# Patient Record
Sex: MALE | Race: WHITE | NOT HISPANIC OR LATINO | Employment: FULL TIME | URBAN - METROPOLITAN AREA
[De-identification: names, ages, dates, MRNs, and addresses within clinical notes are randomized per-mention and may not be internally consistent; named-entity substitution may affect disease eponyms.]

---

## 2017-02-14 ENCOUNTER — ALLSCRIPTS OFFICE VISIT (OUTPATIENT)
Dept: OTHER | Facility: OTHER | Age: 52
End: 2017-02-14

## 2017-03-06 ENCOUNTER — GENERIC CONVERSION - ENCOUNTER (OUTPATIENT)
Dept: OTHER | Facility: OTHER | Age: 52
End: 2017-03-06

## 2017-04-11 ENCOUNTER — ALLSCRIPTS OFFICE VISIT (OUTPATIENT)
Dept: OTHER | Facility: OTHER | Age: 52
End: 2017-04-11

## 2017-04-11 DIAGNOSIS — F11.20 UNCOMPLICATED OPIOID DEPENDENCE (HCC): ICD-10-CM

## 2017-04-11 DIAGNOSIS — Z79.899 OTHER LONG TERM (CURRENT) DRUG THERAPY: ICD-10-CM

## 2017-04-11 DIAGNOSIS — G89.4 CHRONIC PAIN SYNDROME: ICD-10-CM

## 2017-06-06 ENCOUNTER — ALLSCRIPTS OFFICE VISIT (OUTPATIENT)
Dept: OTHER | Facility: OTHER | Age: 52
End: 2017-06-06

## 2017-08-01 ENCOUNTER — ALLSCRIPTS OFFICE VISIT (OUTPATIENT)
Dept: OTHER | Facility: OTHER | Age: 52
End: 2017-08-01

## 2017-09-28 ENCOUNTER — ALLSCRIPTS OFFICE VISIT (OUTPATIENT)
Dept: OTHER | Facility: OTHER | Age: 52
End: 2017-09-28

## 2017-10-29 NOTE — PROGRESS NOTES
Assessment    1  Lumbar radiculopathy (724 4) (M54 16)   2  Low back pain (724 2) (M54 5)   3  Myofascial pain (729 1) (M79 1)   4  Pain syndrome, chronic (338 4) (G89 4)   5  Post laminectomy syndrome (722 80) (M96 1)    Plan  Back pain, Low back pain, Lumbar radiculopathy, Neuropathy    · Nucynta  MG Oral Tablet Extended Release 12 Hour; Take 1 tablet twicedaily; Do Not Fill Before: [id number]   Rx By: Valeriy Umanzor; Dispense: 30 Days ; #:60 Tablet Extended Release 12 Hour; Refill: 0;Back pain, Low back pain, Lumbar radiculopathy, Neuropathy; KARL = N; Print Rx  Cervical radiculopathy, Degenerative disc disease, lumbar, Low back pain, Lumbago,Lumbar radiculopathy, Post laminectomy syndrome    · Hydrocodone-Acetaminophen 5-325 MG Oral Tablet; TAKE 1 TABLET Every 8hours; Do Not Fill Before: [id number]   Rx By: Valeriy Umanzor; Dispense: 30 Days ; #:90 Tablet; Refill: 0;Cervical radiculopathy, Degenerative disc disease, lumbar, Low back pain, Lumbago, Lumbar radiculopathy, Post laminectomy syndrome; KARL = N; Print Rx  Post-thoracotomy pain    · Lidocaine 5 % External Patch (Lidoderm); APPLY 1 PATCH TO THE AFFECTEDAREA AND LEAVE IN PLACE FOR 12 HOURS, THEN REMOVE AND LEAVE OFF FOR 12HOURS   Rx By: Valeriy Umanzor; Dispense: 30 Days ; #:60 Patch; Refill: 1;Post-thoracotomy pain; KARL = N; Verified Transmission to [facility][id number] Last Updated By: System, SureScrigordon; 9/28/2017 2:52:59 PM    Discussion/Summary    This is a 51-year-old male patient who presents to the office for follow-up visit today  The patient is going being treated for lumbar radiculopathy, myofascial pain, chronic pain syndrome, and post laminectomy syndrome  The patient does have a history of T8-T12 decompression and fusion in December 2014 as well as spinal cord stimulator and peripheral nerve stimulator placement   The patient reports that he is currently taking Nucynta extended release 150 mg twice a day and he is also taking hydrocodone?acetaminophen 5/325 3 times a day for his chronic pain symptoms  The patient denies any adverse side effects from these medications at this time  He reports that these medications help him perform his activities of daily living and to continue working  The patient reports that he does take baclofen 10 mg 3 times a day for his myofascial pain and muscle spasm  The patient reports that he is taking Vimovo 500?20 twice a day for his pain symptoms as well  The patient reports that he does take gabapentin as prescribed by his primary care physician and denies any adverse side effects from this medication at this time  plan for this patient is as follows:I will renew this patient's Nucynta extended release 150 mg twice a day for his pain complaints  did provide this patient with 2 prescriptions while he was in the office today for his Nucynta extended release 150 mg with do not fill date of 10/27/17   will renew this patient's hydrocodone?acetaminophen 5/325 3 times a day when necessary for his pain complaints  I encouraged this patient to take this medication as sparingly as possible  did provide this patient with 2 prescriptions for his hydrocodone?acetaminophen while he was in the office today with do not fill date of 10/27/17  risks of opioid medications, including dependence, addiction and tolerance are explained to the patient  The patient understands and agrees to use these medications only as prescribed  I fully discussed the potential side effects of the medication with the patient, which include, but are not limited to, constipation, drowsiness, addiction, impaired judgment and risk of fetal overdose if not taken as prescribed  I have warned the patient ensuring medications as a felony  I warned against driving while taking sedating medications  At this point in time the patient is showing no signs of addiction, abuse, diversion or suicidal ideation    [address] prescription drug monitoring program and found it to be appropriate for what is being prescribed I reviewed for any inconsistencies or evidence of multiple prescribers or drug diversion  A copy of the patient's report can be found in the patients' chart  The patient reports that the Nucynta 150 mg extended release and the hydrocodone?acetaminophen 5/325 is effective at decreasing his pain symptoms  effects: The patient denies adverse effects from the opioid medications at this time  The patient reports that he will occasionally become constipated as a result of his opiate medications, however, he just takes stool softeners and MiraLAX and the problem is resolved  I did offer this patient Movantik, however, the patient reports that he does not need this medication  of daily living: The patient reports that this medication is helpful in allowing them to engage in activities of daily living and increase level of functioning  behavior: The patient displays no indication of aberrant drug behavior at this time  urine drug screen was collected at today's office visit  The urine drug screen will be sent for confirmatory testing  The drug test is medically necessary because the patient is either dependent on opioid medication or being considered for opioid medication therapy in the results could impact ongoing her future treatment  The drug test is to evaluate for the presence or absence of prescribed, nonprescribed, and or illicit drugs/substances  I will renew this patient's baclofen 10 mg 3 times a day when necessary for his myofascial pain and muscle spasm  I will renew this patient's Vimovo 500?20 twice a day when necessary for his pain complaints  I encouraged this patient is take this medication with food and to avoid all other NSAIDs on this medication  The patient should continue with his gabapentin for his neuropathic and radicular pain complaints as prescribed by his primary care physician    The patient should continue with his home exercise program I will follow-up with this patient in 2 months  The patient has the current Goals: To provide this patient with adequate pain relief to improve quality of life and level of functioning  The patent has the current Barriers: Chronic pain syndrome  Chronic opioid use  Opioid tolerance and dependence  Patient is able to Self-Care  The treatment plan was reviewed with the patient/guardian  The patient/guardian understands and agrees with the treatment plan   The patient was counseled regarding instructions for management,-- risk factor reductions,-- prognosis,-- patient and family education,-- impressions,-- risks and benefits of treatment options-- and-- importance of compliance with treatment  total time of encounter was 25 minutes  Chief Complaint    1  Back Pain  Back and leg pain  History of Present Illness  This is a 42-year-old male patient who presents to the office for follow-up visit today  The patient is going being treated for lumbar radiculopathy, myofascial pain, chronic pain syndrome, and post laminectomy syndrome  The patient does have a history of T8-T12 decompression and fusion in December 2014 as well as spinal cord stimulator and peripheral nerve stimulator placement  The patient reports that he is currently taking Nucynta extended release 150 mg twice a day and he is also taking hydrocodone?acetaminophen 5/325 3 times a day for his chronic pain symptoms  The patient denies any adverse side effects from these medications at this time  He reports that these medications help him perform his activities of daily living and to continue working  The patient reports that he does take baclofen 10 mg 3 times a day for his myofascial pain and muscle spasm  The patient reports that he is taking Vimovo 500?20 twice a day for his pain symptoms as well   The patient reports that he does take gabapentin as prescribed by his primary care physician and denies any adverse side effects from this medication at this time  patient reports that his pain is the same as his previous visit rates his pain as 6/10 on the numerical pain rating scale  The patient reports that his pain is worse in the morning and night and that his pain is constant and describes the pain as a burning, dull aching, sharp, throbbing, pressure like, shooting, numbness, and pins and needles-like in nature  The patient reports that the amount of pain relief that he is obtaining now with his current medication regimen and treatment plan is enough to make a real difference in his life  The patient reports that 50% of his pain is being alleviated with his current medication regimen treatment plan   have personally reviewed and/or updated the patient's past medical history, past surgical history, family history, social history, allergies, and vital signs today  Other than as stated above, the patient denies any interval changes in medications, medical condition, mental condition, symptoms, or allergies since last office visit  Juan Rodríguez presents with complaints of constant episodes of bilateral lower back pain, described as sharp, dull, aching, burning and throbbing, radiating to the bilateral lower leg  On a scale of 1 to 10, the patient rates the pain as 6  Symptoms are unchanged  Review of Systems   Constitutional: no fever,-- no recent weight gain-- and-- no recent weight loss  Eyes: no double vision-- and-- no blurry vision  Cardiovascular: no chest pain,-- no palpitations-- and-- no lower extremity edema  Respiratory: no complaints of shortness of breath-- and-- no wheezing  Musculoskeletal: difficulty walking,-- muscle weakness,-- joint stiffness,-- pain in extremity legs-- and-- decreased range of motion, but-- no joint swelling-- and-- no limb swelling  Neurological: no dizziness,-- no difficulty swallowing,-- no memory loss,-- no loss of consciousness-- and-- no seizures    Gastrointestinal: constipation, but-- no nausea,-- no vomiting-- and-- no diarrhea  Genitourinary: no difficulty initiating urine stream,-- no genital pain-- and-- no frequent urination  Integumentary: no complaints of skin rash  Psychiatric: no depression  Endocrine: no excessive thirst,-- no adrenal disease,-- no hypothyroidism-- and-- no hyperthyroidism  Hematologic/Lymphatic: no tendency for easy bruising-- and-- no tendency for easy bleeding  ROS reviewed  Active Problems    1  Analgesic use (V58 69) (Z79 899)   2  Arthritis (716 90) (M19 90)   3  Back pain (724 5) (M54 9)   4  Cervical radiculopathy (723 4) (M54 12)   5  Constipation (564 00) (K59 00)   6  Degenerative disc disease, lumbar (722 52) (M51 36)   7  Diabetes Mellitus (250 00)   8  Dysphagia (787 20) (R13 10)   9  Encounter for removal of staples (V58 32) (Z48 02)   10  Eye problems (V41 1) (H57 9)   11  Facial pain (784 0) (R51)   12  Fatigue (780 79) (R53 83)   13  Feeling weak (780 79) (R53 1)   14  Gait disturbance (781 2) (R26 9)   15  Heart burn (787 1) (R12)   16  History of allergy (V15 09) (Z88 9)   17  Hypercholesterolemia (272 0) (E78 00)   18  Hyperlipidemia (272 4) (E78 5)   19  Kyphoscoliosis (737 30) (M41 9)   20  Limb pain (729 5) (M79 609)   21  Low back pain (724 2) (M54 5)   22  Lumbago (724 2) (M54 5)   23  Lumbar radiculopathy (724 4) (M54 16)   24  Muscle weakness (728 87) (M62 81)   25  Myofascial pain (729 1) (M79 1)   26  Neuropathy (355 9) (G62 9)   27  Numbness (782 0) (R20 0)   28  Opioid dependence (304 00) (F11 20)   29  Other congenital malformations of spine, 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)   30  Other spondylosis with myelopathy, thoracic region (721 41) (M47 14)   31  Pain syndrome, chronic (338 4) (G89 4)   32  Paralysis (344 9) (G83 9)   33  Post laminectomy syndrome (722 80) (M96 1)   34  Postlaminectomy Kyphosis (737 12)   35  Postoperative visit (C95 49) (Z97 89)   36   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)   37  Rib pain on right side (786 50) (R07 81)   38  Ringing In The Ears (Tinnitus) (388 30)   39  Surgery, elective (V50 9) (Z41 9)   40  Thoracic disc herniation (722 11) (M51 24)   41  Thoracic spinal stenosis (724 01) (M48 04)   42  TMJ arthralgia (524 62) (M26 629)   43  Tobacco use (305 1) (Z72 0)   44  Trigeminal neuralgia (350 1) (G50 0)   45  Trigger finger of both hands (727 03) (M65 30)    Past Medical History  1  History of Complaint Of Allergic Reaction Seasonal   2  Diabetes Mellitus (250 00)   3  History of GERD (gastroesophageal reflux disease) (530 81) (K21 9)   4  History of heartburn (V12 79) (Z87 898)   5  Hypercholesterolemia (272 0) (E78 00)   6  History of Hypertension (401 9) (I10)   7  History of Paraplegia   8  History of Thoracic back pain (724 1) (M54 6)   9  History of Urinary Tract Infection (V13 02)    The active problems and past medical history were reviewed and updated today  Surgical History  1  History of Back Surgery   2  History of Intracranial Stereotactic Gamma Radiosurgery   3  History of Intracranial Stereotactic Gamma Radiosurgery   4  History of Laminectomy Lumbar   5  History of Rhizotomy   6  History of Rhizotomy   7  History of Spinal Surgery Neurostimulator Implants    The surgical history was reviewed and updated today  Family History  Mother    1  No pertinent family history  Maternal Grandmother    2  Family history of Acute Myocardial Infarction (V17 3)  Paternal Grandmother    3  Family history of Acute Myocardial Infarction (V17 3)  Maternal Grandfather    4  Family history of Suicide Completion  Paternal Grandfather    5  Family history of Acute Myocardial Infarction (V17 3)  Unknown    6  Family history of Leukemia  Family History    7  Family history of Acute Myocardial Infarction (V17 3)   8  Family history of Maternal Grandfather Is    5  Family history of Maternal Grandmother Is    8   Family history of Paternal Grandfather Is    6  Family history of Paternal Grandmother Is    15  Family history of Suicide Completion    The family history was reviewed and updated today  Social History     · Being A Social Drinker   · Current Every Day Smoker   · Denied: History of Drug Use   · Marital History - Single   · Occupation:   · Tobacco use (305 1) (Z72 0)  The social history was reviewed and updated today  The social history was reviewed and is unchanged  Current Meds   1  Atorvastatin Calcium 80 MG Oral Tablet; Therapy: (Recorded:2014) to Recorded   2  Baclofen 10 MG Oral Tablet; TAKE 1 TABLET 3 TIMES DAILY AS NEEDED FOR MUSCLE SPASM  Requested for: 2017; Last Rx:2017 Ordered   3  Gabapentin 400 MG Oral Capsule; Take 1 capsule twice daily; Therapy: (Recorded:10Zkq2526) to Recorded   4  Gabapentin 600 MG Oral Tablet; Take 1 tablet twice daily; Therapy: (Recorded:2015) to Recorded   5  Gabapentin 800 MG Oral Tablet; Take 1 tablet twice daily; Therapy: (Recorded:2015) to Recorded   6  Hydrocodone-Acetaminophen 5-325 MG Oral Tablet; TAKE 1 TABLET Every 8 hours; Therapy: 45NMJ6810 to (Evaluate:85Dvv6052); Last Rx:2017 Ordered   7  Lidocaine 5 % External Patch; APPLY 1 PATCH TO THE AFFECTED AREA AND LEAVE IN PLACE FOR 12 HOURS, THEN REMOVE AND LEAVE OFF FOR 12 HOURS  Requested for: ; Last Rx:2017 Ordered   8  Lisinopril 2 5 MG Oral Tablet; TAKE 1 TABLET DAILY; Therapy: (Recorded:72Sdm2730) to Recorded   9  MetFORMIN HCl - 1000 MG Oral Tablet; TAKE 1 TABLET TWICE DAILY; Therapy: (Recorded:81Lpq7887) to Recorded   10  Multi-Vitamin Gummies CHEW;  Therapy: (Recorded:56Sdt3378) to Recorded   11  Nucynta  MG Oral Tablet Extended Release 12 Hour; Take 1 tablet twice daily; Therapy: 80DFJ8628 to (Evaluate:21Wez1296); Last Rx:2017 Ordered   12  Omeprazole 40 MG Oral Capsule Delayed Release; Therapy: 76ZVE5811 to Recorded   13   Polyethylene Glycol 3350 Oral Packet; MIX 1 PACKET IN 8 OUNCES OF LIQUID AND  DRINK ONCE DAILY; Therapy: 89EHK3592 to (Evaluate:82Mza8768)  Requested for: 00FIX5637; Last  Rx:05Byo6295 Ordered   14  Trileptal 600 MG Oral Tablet; TAKE 2 TABLETS TWICE DAILY; Therapy: (Recorded:23Guj1811) to Recorded   15  Vimovo 500-20 MG Oral Tablet Delayed Release; TAKE 1 TABLET Twice daily PRN; Therapy: 99VTJ0849 to (Evaluate:21Pcm6734)  Requested for: 30UFK0246; Last  Rx:00Hwl1373 Ordered    The medication list was reviewed and updated today  Allergies  1  Sulfa Drugs    Vitals  Vital Signs    Recorded: 08NMM2215 02:29PM   Temperature 98 8 F   Heart Rate 82   Respiration 18   Systolic 987   Diastolic 78   Height 5 ft 9 in   Weight 198 lb    BMI Calculated 29 24   BSA Calculated 2 06   Pain Scale 6       Physical Exam   Constitutional  General appearance: Abnormal  -- Endomorphic body habitus  Eyes  Sclera: anicteric  HEENT  Hearing grossly intact  Neck  Neck: Supple, symmetric, trachea midline, no masses  Pulmonary  Respiratory effort: Even and unlabored  Cardiovascular  Examination of extremities: No edema or pitting edema present  Abdomen  Abdomen: Abnormal  -- Protuberant  Skin  Skin and subcutaneous tissue: Normal without rashes or lesions, well hydrated  Psychiatric  Mood and affect: Mood and affect appropriate  Neurologic  Cranial nerves: Cranial nerves II-XII grossly intact  the muscle tone was normal  Musculoskeletal  Gait and station: Abnormal  -- Antalgic  Lumbar/Sacral Spine examination demonstrates  5/5 lower extremity strength in all muscle groups bilaterally  Negative seated straight leg raise bilaterally  Lumbar paraspinals tender to palpation with muscle spasms noted        Future Appointments    Date/Time Provider Specialty Site   11/22/2017 03:00 PM [facility]       Signatures   Electronically signed by : Erin Eastman; Sep 28 2017  3:49PM EST (Author)    Electronically signed by : Josie Muñiz DO; Oct 13 2017 11:59AM EST

## 2017-12-06 ENCOUNTER — ALLSCRIPTS OFFICE VISIT (OUTPATIENT)
Dept: OTHER | Facility: OTHER | Age: 52
End: 2017-12-06

## 2017-12-11 NOTE — PROGRESS NOTES
Assessment    1  Chronic bilateral low back pain (724 2,338 29) (M54 5,G89 29)   2  Myofascial pain (729 1) (M79 1)   3  Chronic lumbar radiculopathy (724 4) (M54 16)   4  Pain syndrome, chronic (338 4) (G89 4)   5  Post laminectomy syndrome (722 80) (M96 1)   6  Degenerative disc disease, lumbar (722 52) (M51 36)    Plan   Back pain, Chronic bilateral low back pain, Chronic lumbar radiculopathy, Neuropathy    · Nucynta  MG Oral Tablet Extended Release 12 Hour; Take 1 tablet twicedaily   Rx By: Claire Gutierrez; Dispense: 30 Days ; #:60 Tablet Extended Release 12 Hour; Refill: 0;Back pain, Chronic bilateral low back pain, Chronic lumbar radiculopathy, Neuropathy; KARL = N; Print Rx  Cervical radiculopathy, Chronic bilateral low back pain, Chronic lumbar radiculopathy,Degenerative disc disease, lumbar, PMH: History of low back pain, Postlaminectomy syndrome    · From  Hydrocodone-Acetaminophen 5-325 MG Oral Tablet TAKE 1 TABLETEvery 8 hours To Hydrocodone-Acetaminophen 5-325 MG Oral Tablet TAKE 1 TABLETEVERY 12 HOURS PRN FOR PAIN   Rx By: Claire Gutierrez; Dispense: 30 Days ; #:60 Tablet; Refill: 0;Cervical radiculopathy, Chronic bilateral low back pain, Chronic lumbar radiculopathy, Degenerative disc disease, lumbar, PMH: History of low back pain, Post laminectomy syndrome; KARL = N; Print Rx  Myofascial pain    · Baclofen 10 MG Oral Tablet; TAKE 1 TABLET 2 TIMES DAILY AS NEEDED FORMUSCLE SPASM   Rx By: Claire Gutierrez; Dispense: 90 Days ; #:180 Tablet; Refill: 0;Myofascial pain; KARL = N; Rx auto-faxed to [facility]); Last Updated By: System, [employer]; 12/6/2017 2:02:36 PM  Post-thoracotomy pain    · Lidocaine 5 % External Patch (Lidoderm); APPLY 1 PATCH TO THE AFFECTEDAREA AND LEAVE IN PLACE FOR 12 HOURS, THEN REMOVE AND LEAVE OFF FOR 12HOURS   Rx By: Lurdes Vigil; Dispense: 30 Days ; #:60 Patch;  Refill: 1;Post-thoracotomy pain; KARL = N; Verified Transmission to [facility]Y #[id number] Last Updated Mani Leyva; 12/6/2017 2:01:44 PM  Unlinked    · Gabapentin 400 MG Oral Capsule   Dispense: 0 Days ; #: Sufficient Capsule; Refill: 0; KARL = N; Record; Last Updated By: Ledy Ferro; 12/6/2017 1:57:58 PM  Follow-up visit in 10 weeks Evaluation and Treatment  Follow-up  Status: Hold For - Scheduling  Requested for: [id number] Ordered; For: Pain syndrome, chronic;  Ordered By: Ledy Ferro  Performed:   Due: 50YQL4062   Discussion/Summary    While the patient was in the office today, I did have a thorough conversation with the patient regarding his medication regimen and treatment plan  I explained to the patient that at this point in time since he is noting moderate stable relief, and typically only uses the p r n  Norco twice a day, both myself and the patient feel would be in his best interest to decrease the p r n  Norco down to just twice a day and continue the rest of his medication regimen as prescribed  However, because the patient reports that he has a full prescription for both the p r n  [facility] to  at his pharmacy and 1 on filled prescription of each at home, he will only need 1 prescription for his pain medications which they gave him and put a do not fill date of January 31, 2018  I advised the patient that if they experience any side effects or issues with the changes in their medication regiment, they should give our office a call to discuss  I also advised the patient not to drive or operate machinery until they see how the changes in the medication regimen affects them  The patient was agreeable and verbalized an understanding  encouraged the patient continue to use his spinal cord stimulators and to follow up with the stimulator teens as needed for any reprogramming  The patient was agreeable and verbalized an understanding    the patient was in the office today, I did review the patient's report on the [facility] web site and found it to be appropriate for what is being prescribed and I reviewed it for any inconsistencies or evidence of multiple prescribers/drug diversion  A copy of the patient's report can be found in their chart  the patient was in the office today, an annual review of the opioid contract/agreement was conducted, the patient was agreeable to continuing the contract as previously agreed upon and signed for this calendar year  risk of opioid medications, including dependence, addiction and tolerance were explained to the patient  The patient understands and agrees to use these medications only as prescribed  I have fully discussed the potential side effects of the medication with the patient, which include, but are not limited to, constipation, drowsiness, addiction, impaired judgment and risk of fatal overdose as not taken as prescribed  I have warned the patient that sharing medications is a felony  I warned against driving while taking sedating medications  At this point in time, the patient is showing no signs of addiction, abuse, diversion or suicidal ideation  The patient has the current Goals: To continue with at least a current level of pain relief and medication regimen as prescribed, utilizing the least amount of opioids possible  The patent has the current Barriers: Chronic pain syndrome and opioid dependence  Patient is able to Self-Care  The treatment plan was reviewed with the patient/guardian  The patient/guardian understands and agrees with the treatment plan   The patient was counseled regarding instructions for management,-- prognosis,-- patient and family education,-- risks and benefits of treatment options-- and-- importance of compliance with treatment  total time of encounter was 25 minutes  Chief Complaint    1  Back Pain  Chronic low back and leg pain, stable  History of Present Illness  The patient presents today for a follow-up office visit   He is currently being treated for his chronic low back and lower extremity radicular symptoms as he does feel that the spinal cord stimulator is helpful for the leg pain, but that the peripheral nerve stimulator is not helpful for the back pain as much as he would like  The patient's spinal cord and peripheral field nerve stimulator is a [employer] Device, but he also has a deep brain stimulator with [employer] Avanza  The patient reports that overall his current medication regimen does provide at least 50% relief of his pain symptoms, without any significant side effects or issues  He also reports that he tries to use the p r n  Norco on a sparing as-needed basis and typically at most uses 2-3 pills a day, but generally only uses 2 pills a day as needed  He continues with the Nucynta ER and Vimovo as prescribed  The patient presents today to discuss his medication regimen treatment plan  Cynthia Rivera presents with complaints of constant episodes of bilateral lower back pain, described as sharp, dull, aching and burning, radiating to the bilateral buttock, bilateral thigh, bilateral lower leg and bilateral foot  On a scale of 1 to 10, the patient rates the pain as 6  Symptoms are unchanged  Review of Systems   Constitutional: no fever,-- no recent weight gain-- and-- no recent weight loss  Eyes: no double vision-- and-- no blurry vision  Cardiovascular: no chest pain,-- no palpitations-- and-- no lower extremity edema  Respiratory: no complaints of shortness of breath-- and-- no wheezing  Musculoskeletal: difficulty walking,-- muscle weakness,-- joint stiffness-- and-- decreased range of motion, but-- no joint swelling,-- no limb swelling-- and-- no pain in extremity  Neurological: no dizziness,-- no difficulty swallowing,-- no memory loss,-- no loss of consciousness-- and-- no seizures  Gastrointestinal: constipation, but-- no nausea,-- no vomiting-- and-- no diarrhea    Genitourinary: no difficulty initiating urine stream,-- no genital pain-- and-- no frequent urination  Integumentary: no complaints of skin rash  Psychiatric: no depression  Endocrine: no excessive thirst,-- no adrenal disease,-- no hypothyroidism-- and-- no hyperthyroidism  Hematologic/Lymphatic: no tendency for easy bruising-- and-- no tendency for easy bleeding  Active Problems    1  Analgesic use (V58 69) (Z79 899)   2  Arthritis (716 90) (M19 90)   3  Back pain (724 5) (M54 9)   4  Cervical radiculopathy (723 4) (M54 12)   5  Chronic bilateral low back pain (724 2,338 29) (M54 5,G89 29)   6  Chronic lumbar radiculopathy (724 4) (M54 16)   7  Constipation (564 00) (K59 00)   8  Degenerative disc disease, lumbar (722 52) (M51 36)   9  Diabetes Mellitus (250 00)   10  Dysphagia (787 20) (R13 10)   11  Encounter for removal of staples (V58 32) (Z48 02)   12  Eye problems (V41 1) (H57 9)   13  Facial pain (784 0) (R51)   14  Fatigue (780 79) (R53 83)   15  Feeling weak (780 79) (R53 1)   16  Gait disturbance (781 2) (R26 9)   17  Heart burn (787 1) (R12)   18  History of allergy (V15 09) (Z88 9)   19  Hypercholesterolemia (272 0) (E78 00)   20  Hyperlipidemia (272 4) (E78 5)   21  Kyphoscoliosis (737 30) (M41 9)   22  Limb pain (729 5) (M79 609)   23  Muscle weakness (728 87) (M62 81)   24  Myofascial pain (729 1) (M79 1)   25  Neuropathy (355 9) (G62 9)   26  Numbness (782 0) (R20 0)   27  Opioid dependence (304 00) (F11 20)   28  Other congenital malformations of spine, 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)   29  Other spondylosis with myelopathy, thoracic region (721 41) (M47 14)   30  Pain syndrome, chronic (338 4) (G89 4)   31  Paralysis (344 9) (G83 9)   32  Post laminectomy syndrome (722 80) (M96 1)   33  Postlaminectomy Kyphosis (737 12)   34  Postoperative visit (V58 49) (Z48 89)   35  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)   36  Rib pain on right side (786 50) (R07 81)   37  Ringing In The Ears (Tinnitus) (388 30)   38  Surgery, elective (V50 9) (Z41 9)   39  Thoracic disc herniation (722 11) (M51 24)   40  Thoracic spinal stenosis (724 01) (M48 04)   41  TMJ arthralgia (524 62) (M26 629)   42  Tobacco use (305 1) (Z72 0)   43  Trigeminal neuralgia (350 1) (G50 0)   44  Trigger finger of both hands (727 03) (M65 30)    Past Medical History  1  History of Complaint Of Allergic Reaction Seasonal   2  Diabetes Mellitus (250 00)   3  History of GERD (gastroesophageal reflux disease) (530 81) (K21 9)   4  History of heartburn (V12 79) (Z87 898)   5  History of low back pain (V13 59) (Z87 39)   6  Hypercholesterolemia (272 0) (E78 00)   7  History of Hypertension (401 9) (I10)   8  History of Paraplegia   9  History of Thoracic back pain (724 1) (M54 6)   10  History of Urinary Tract Infection (V13 02)    The active problems and past medical history were reviewed and updated today  Surgical History  1  History of Back Surgery   2  History of Intracranial Stereotactic Gamma Radiosurgery   3  History of Intracranial Stereotactic Gamma Radiosurgery   4  History of Laminectomy Lumbar   5  History of Rhizotomy   6  History of Rhizotomy   7  History of Spinal Surgery Neurostimulator Implants    The surgical history was reviewed and updated today  Family History  Mother    1  No pertinent family history  Maternal Grandmother    2  Family history of Acute Myocardial Infarction (V17 3)  Paternal Grandmother    3  Family history of Acute Myocardial Infarction (V17 3)  Maternal Grandfather    4  Family history of Suicide Completion  Paternal Grandfather    5  Family history of Acute Myocardial Infarction (V17 3)  Unknown    6  Family history of Leukemia  Family History    7  Family history of Acute Myocardial Infarction (V17 3)   8  Family history of Maternal Grandfather Is    5  Family history of Maternal Grandmother Is    8  Family history of Paternal Grandfather Is    6  Family history of Paternal Grandmother Is    15   Family history of Suicide Completion    The family history was reviewed and updated today  Social History     · Being A Social Drinker   · Current Every Day Smoker   · Denied: History of Drug Use   · Marital History - Single   · Occupation:   · Tobacco use (305 1) (Z72 0)  The social history was reviewed and updated today  The social history was reviewed and is unchanged  Current Meds   1  Atorvastatin Calcium 80 MG Oral Tablet; Therapy: (Recorded:21Mar2014) to Recorded   2  Baclofen 10 MG Oral Tablet; TAKE 1 TABLET 3 TIMES DAILY AS NEEDED FOR MUSCLE SPASM  Requested for: 07PND7883; Last Rx:53Nfh6110 Ordered   3  Elavil 25 MG Oral Tablet; Therapy: 07KUI0113 to Recorded   4  Gabapentin 400 MG Oral Capsule; Take 1 capsule twice daily; Therapy: (Recorded:19Zah3664) to Recorded   5  Gabapentin 600 MG Oral Tablet; Take 1 tablet twice daily; Therapy: (Recorded:93Xjl6538) to Recorded   6  Gabapentin 800 MG Oral Tablet; Take 1 tablet twice daily; Therapy: (Recorded:17Bty3527) to Recorded   7  Hydrocodone-Acetaminophen 5-325 MG Oral Tablet; TAKE 1 TABLET Every 8 hours; Therapy: 51UNB2172 to (Evaluate:26Nov2017); Last Rx:28Sep2017 Ordered   8  Lidocaine 5 % External Patch; APPLY 1 PATCH TO THE AFFECTED AREA AND LEAVE IN PLACE FOR 12 HOURS, THEN REMOVE AND LEAVE OFF FOR 12 HOURS  Requested for: 37JBF4202; Last Rx:28Sep2017 Ordered   9  Lisinopril 2 5 MG Oral Tablet; TAKE 1 TABLET DAILY; Therapy: (Recorded:31Zwe8251) to Recorded   10  MetFORMIN HCl - 1000 MG Oral Tablet; TAKE 1 TABLET TWICE DAILY; Therapy: (Recorded:91Kcu8378) to Recorded   11  Multi-Vitamin Gummies CHEW;  Therapy: (Recorded:76Mhs0552) to Recorded   12  Nucynta  MG Oral Tablet Extended Release 12 Hour; Take 1 tablet twice daily; Therapy: 54QLT5380 to (Evaluate:26Nov2017); Last Rx:28Sep2017 Ordered   13  Omeprazole 40 MG Oral Capsule Delayed Release; Therapy: 14KDH4253 to Recorded   14   Polyethylene Glycol 3350 Oral Packet; MIX 1 PACKET IN 8 OUNCES OF LIQUID AND  DRINK ONCE DAILY; Therapy: 37JOJ6103 to (Evaluate:71Dhf3900)  Requested for: 34DOJ0515; Last  Rx:32Bpo0951 Ordered   15  Trileptal 600 MG Oral Tablet; TAKE 2 TABLETS TWICE DAILY; Therapy: (Recorded:29Ofw6531) to Recorded   16  Vimovo 500-20 MG Oral Tablet Delayed Release; TAKE 1 TABLET Twice daily PRN; Therapy: 99UMV2517 to (Evaluate:83Itw5472)  Requested for: 20AOK8302; Last  Rx:60Lvw8743 Ordered    The medication list was reviewed and updated today  Allergies  1  Sulfa Drugs    Vitals  Vital Signs    Recorded: 32SOO9390 01:43PM   Temperature 99 F   Heart Rate 94   Systolic 413   Diastolic 90   Height 5 ft 9 in   Weight 197 lb    BMI Calculated 29 09   BSA Calculated 2 05   Pain Scale 6       Physical Exam   Constitutional  General appearance: Well developed, well nourished, alert, in no distress, non-toxic and no overt pain behavior  Eyes  Sclera: anicteric  HEENT  Hearing grossly intact  Pulmonary  Respiratory effort: Even and unlabored  Cardiovascular  Examination of extremities: No edema or pitting edema present  Abdomen  Abdomen: Soft, non-tender, non-distended  Skin  Skin and subcutaneous tissue: Normal without rashes or lesions, well hydrated  Psychiatric  Mood and affect: Mood and affect appropriate  Neurologic Motor Tone:   Cranial nerves: Cranial nerves II-XII grossly intact  -- Slightly antalgic, but steady gait without the use of any assistive devices  Musculoskeletal       Results/Data  Procedure Flowsheet [id number] 02:05PM      Test Name Result Flag Reference   Opioid Contract Renewed [id number]         Future Appointments    Date/Time Provider Specialty Site   02/14/2018 02:15 PM TONY F[facility] 15       Signatures   Electronically signed by : TONY Wild;  Dec  8 2017  7:21AM EST                       (Author)    Electronically signed by : Tiara Whitehead DO; Dec 10 2017  7:52PM EST

## 2018-01-13 VITALS
TEMPERATURE: 98.7 F | DIASTOLIC BLOOD PRESSURE: 90 MMHG | HEART RATE: 98 BPM | HEIGHT: 69 IN | SYSTOLIC BLOOD PRESSURE: 138 MMHG | BODY MASS INDEX: 28.58 KG/M2 | WEIGHT: 193 LBS

## 2018-01-13 VITALS
DIASTOLIC BLOOD PRESSURE: 78 MMHG | HEIGHT: 69 IN | WEIGHT: 198 LBS | SYSTOLIC BLOOD PRESSURE: 122 MMHG | BODY MASS INDEX: 29.33 KG/M2 | RESPIRATION RATE: 18 BRPM | HEART RATE: 82 BPM | TEMPERATURE: 98.8 F

## 2018-01-13 VITALS
SYSTOLIC BLOOD PRESSURE: 128 MMHG | BODY MASS INDEX: 28.44 KG/M2 | TEMPERATURE: 98.9 F | WEIGHT: 192 LBS | HEIGHT: 69 IN | DIASTOLIC BLOOD PRESSURE: 88 MMHG | HEART RATE: 99 BPM

## 2018-01-13 NOTE — RESULT NOTES
Message   Recorded as Task   Date: 06/07/2016 10:39 AM, Created By: Case Monge   Task Name: Follow Up   Assigned To: [facility],Team   Regarding Patient: Severo Schiller, Status: In Progress   Comment:    Cuca Quevedo - 07 Jun 2016 10:39 AM     TASK CREATED  Caller: Self; Other; [phone] (Home); [phone] x,,,,, (Work)  **See previous task**    Pt left VM today at 1025 returning our call  Requested to be cb at work #[phone]    CB to pt,VM left to cb  Cuca Quevedo - 07 Jun 2016 10:40 AM     TASK IN PROGRESS   Aarti Hobbs - 08 Jun 2016 3:30 PM     TASK EDITED  Attempted to call cell and work and lmom to [facility]  Aarti Hobbs - 10 Binh 2016 9:44 AM     TASK EDITED  Pt  has an appt  on 7/1 c/ you  Do you want us to send him a can't reach you letter? Or F/u at povs? HD to advise  Thanks   Balaji Mchugh - 10 Binh 2016 10:08 AM     TASK REPLIED TO: Previously Assigned To Balaji Mchugh  I can f/u during ov  Or he can call us if needed          Signatures   Electronically signed by : La Nena Brooks, ; Binh 10 2016 10:53AM EST                       (Author)

## 2018-01-14 VITALS
HEART RATE: 83 BPM | TEMPERATURE: 97.9 F | HEIGHT: 69 IN | BODY MASS INDEX: 29.47 KG/M2 | WEIGHT: 199 LBS | SYSTOLIC BLOOD PRESSURE: 149 MMHG | DIASTOLIC BLOOD PRESSURE: 89 MMHG

## 2018-01-14 VITALS
HEIGHT: 69 IN | WEIGHT: 193 LBS | BODY MASS INDEX: 28.58 KG/M2 | SYSTOLIC BLOOD PRESSURE: 138 MMHG | TEMPERATURE: 98.5 F | HEART RATE: 101 BPM | DIASTOLIC BLOOD PRESSURE: 98 MMHG

## 2018-01-14 NOTE — MISCELLANEOUS
Message   Recorded as Task   Date: 03/02/2017 01:25 PM, Created By: Avery Hart   Task Name: Follow Up   Assigned To: [facility],Team   Regarding Patient: Violeta Francisco, Status: In Progress   Comment:    Aarti Hobbs - 02 Mar 2017 1:25 PM     TASK CREATED  Caller: Self; Results Inquiry; [phone] (Home); [phone] x,,,,, (Work)  S/w the pt  after receiving a vmlom from 1232 from the pt  stating he needs the FMLA form filled out on question 3 is located under part C- amount and type of leave needed  It has to state 5x a week, 1 to 24 hrs  JW to advise  Thanks   Ninfa Berg - 02 Mar 2017 5:26 PM     TASK REPLIED TO: Previously Assigned To Frank Green                      Correction made and will be faxed off to [facility] office tomorrow   Aarti Hobbs - 03 Mar 2017 7:56 AM     TASK EDITED   Aarti Hobbs - 03 Mar 2017 10:17 AM     TASK EDITED  Attempted to call pt and unable to leave message d/t pt  not being able to accept calls right now  Will attempt again later  Aarti Hobbs - 29 Mar 2017 10:17 AM     TASK IN PROGRESS   Alfredo Garcia - 03 Mar 2017 1:43 PM     TASK EDITED  attempted to call pt again and unable to leave message  Ramonita Koo - 06 Mar 2017 10:12 AM     TASK EDITED  2nd attempt to call pt again and unable to leave a message  Yany Tahkkar - 06 Mar 2017 10:26 AM     TASK EDITED  Pt's phone is shut off and not accepting incoming calls  Paperwork was refaxed on 3/3/17  Active Problems    1  Analgesic use (V58 69) (Z79 899)   2  Arthritis (716 90) (M19 90)   3  Back pain (724 5) (M54 9)   4  Cervical radiculopathy (723 4) (M54 12)   5  Constipation (564 00) (K59 00)   6  Degenerative disc disease, lumbar (722 52) (M51 36)   7  Diabetes Mellitus (250 00)   8  Dysphagia (787 20) (R13 10)   9  Encounter for removal of staples (V58 32) (Z48 02)   10  Eye problems (V41 1) (H57 9)   11  Facial pain (784 0) (R51)   12  Fatigue (780 79) (R53 83)   13   Feeling weak (780 79) (R53 1)   14  Gait disturbance (781 2) (R26 9)   15  Heart burn (787 1) (R12)   16  History of allergy (V15 09) (Z88 9)   17  Hypercholesterolemia (272 0) (E78 00)   18  Hyperlipidemia (272 4) (E78 5)   19  Kyphoscoliosis (737 30) (M41 9)   20  Limb pain (729 5) (M79 609)   21  Low back pain (724 2) (M54 5)   22  Lumbago (724 2) (M54 5)   23  Lumbar radiculopathy (724 4) (M54 16)   24  Muscle weakness (728 87) (M62 81)   25  Myofascial pain (729 1) (M79 1)   26  Neuropathy (355 9) (G62 9)   27  Numbness (782 0) (R20 0)   28  Opioid dependence (304 00) (F11 20)   29  Other congenital malformations of spine, 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)   30  Other spondylosis with myelopathy, thoracic region (721 41) (M47 14)   31  Pain syndrome, chronic (338 4) (G89 4)   32  Paralysis (344 9) (G83 9)   33  Post laminectomy syndrome (722 80) (M96 1)   34  Postlaminectomy Kyphosis (737 12)   35  Postoperative visit (V58 49) (Z48 89)   36  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)   37  Rib pain on right side (786 50) (R07 81)   38  Ringing In The Ears (Tinnitus) (388 30)   39  Surgery, elective (V50 9) (Z41 9)   40  Thoracic disc herniation (722 11) (M51 24)   41  Thoracic spinal stenosis (724 01) (M48 04)   42  TMJ arthralgia (524 62) (M26 629)   43  Tobacco use (305 1) (Z72 0)   44  Trigeminal neuralgia (350 1) (G50 0)   45  Trigger finger of both hands (727 03) (M65 30)    Current Meds   1  Atorvastatin Calcium 80 MG Oral Tablet; Therapy: (Recorded:21Mar2014) to Recorded   2  Baclofen 10 MG Oral Tablet; TAKE 1 TABLET 3 TIMES DAILY AS NEEDED FOR MUSCLE   SPASM  Requested for: 26OFZ5465; Last Rx:07Isp4028 Ordered   3  Gabapentin 400 MG Oral Capsule; Take 1 capsule twice daily; Therapy: (Recorded:95Bpn1744) to Recorded   4  Gabapentin 600 MG Oral Tablet; Take 1 tablet twice daily; Therapy: (Recorded:84Ozj8244) to Recorded   5  Gabapentin 800 MG Oral Tablet; Take 1 tablet twice daily;    Therapy: (Recorded:54Wdi2212) to Recorded   6  Hydrocodone-Acetaminophen 5-325 MG Oral Tablet; TAKE 1 TABLET Every 8 hours; Therapy: 92CVS5305 to (Evaluate:89Hcw1929); Last Rx:40Owq9327 Ordered   7  Lidocaine 5 % External Patch (Lidoderm); APPLY 1 PATCH TO THE AFFECTED AREA   AND LEAVE IN PLACE FOR 12 HOURS, THEN REMOVE AND LEAVE OFF FOR 12   HOURS  Requested for: 55ZBW2589; Last Rx:82Yvb0305 Ordered   8  Lisinopril 2 5 MG Oral Tablet; TAKE 1 TABLET DAILY; Therapy: (Recorded:97Mdx5523) to Recorded   9  MetFORMIN HCl - 1000 MG Oral Tablet; TAKE 1 TABLET TWICE DAILY; Therapy: (Recorded:24Sja5039) to Recorded   10  Multi-Vitamin Gummies CHEW;    Therapy: (Recorded:53Ftf3474) to Recorded   11  Nucynta  MG Oral Tablet Extended Release 12 Hour; Take 1 tablet twice daily; Therapy: 97FJA6781 to (Evaluate:49Phd8882); Last Rx:00Kkr6090 Ordered   12  Omeprazole 40 MG Oral Capsule Delayed Release; Therapy: 15KGV4315 to Recorded   13  Polyethylene Glycol 3350 Oral Packet; MIX 1 PACKET IN 8 OUNCES OF LIQUID AND    DRINK ONCE DAILY; Therapy: 16MHD5022 to (Evaluate:68Eso7013)  Requested for: 98KKX7222; Last    Rx:08Mcl2492 Ordered   14  Trileptal 600 MG Oral Tablet (OXcarbazepine); TAKE 2 TABLETS TWICE DAILY; Therapy: (Recorded:15Znz1240) to Recorded   15  Vimovo 500-20 MG Oral Tablet Delayed Release; TAKE 1 TABLET Twice daily PRN; Therapy: 68JVD3227 to (Evaluate:72Lef0378)  Requested for: 11CLI2111; Last    Rx:07Ohd3699 Ordered   16  Voltaren 1 % Transdermal Gel (Diclofenac Sodium); Therapy: 73Unj8330 to (Evaluate:98Ijy8358) Recorded    Allergies    1   Sulfa Drugs    Signatures   Electronically signed by : Destini Ojeda RN; Mar  6 2017 10:26AM EST                       (Author)

## 2018-01-16 NOTE — RESULT NOTES
Message  Pt left VM stating he needs blood work for CT scan  I spoke with SLN,who checked with radiologist,and no bloodwork is needed because CT is ordered without contrast   I spoke with pt and advised of above,will cb if any other issues  Signatures   Electronically signed by :  Consuelo Hicks, ; May 25 2016  9:45AM EST                       (Author)

## 2018-01-16 NOTE — MISCELLANEOUS
Message   Recorded as Task   Date: 05/31/2016 03:59 PM, Created By: Madonna Vance   Task Name: Follow Up   Assigned To: [facility]   Regarding Patient: Angel Washburn, Status: In Progress   Comment:    JeisonBalaji - 31 May 2016 3:59 PM     TASK CREATED  Please inform patient of this CT scan result of the cervical spine:    Minor cervical degenerative changes with minimal narrowing  No compression to explain his trigger finger and hand symptoms  He can f/u with orthopedic  MayGreg kochu - 31 May 2016 4:10 PM     TASK REPLIED TO: Previously Assigned To [facility]  VM left on home phone to cb  MayzeeCuca - 31 May 2016 4:10 PM     TASK IN PROGRESS   MayCuca koch - 01 Jun 2016 11:39 AM     TASK REPLIED TO: Previously Assigned To [facility]  Pt left msg returning our call,states he will cb at 1230  [facility] - 01 Jun 2016 3:14 PM     TASK REPLIED TO: Previously Assigned To [facility]  Pt returned call and requested to be called back at his work#[phone]  CB to number provided and detailed VM left advising of the same and to cb with questions of if referral needed for orthopedic  Active Problems    1  Analgesic use (V58 69) (Z79 899)   2  Arthritis (716 90) (M19 90)   3  Back pain (724 5) (M54 9)   4  Cervical radiculopathy (723 4) (M54 12)   5  Constipation (564 00) (K59 00)   6  Degenerative disc disease, lumbar (722 52) (M51 36)   7  Diabetes Mellitus (250 00)   8  Dysphagia (787 20) (R13 10)   9  Encounter for removal of staples (V58 32) (Z48 02)   10  Eye problems (V41 1) (H57 9)   11  Facial pain (784 0) (R51)   12  Fatigue (780 79) (R53 83)   13  Feeling weak (780 79) (R53 1)   14  Gait disturbance (781 2) (R26 9)   15  Heart burn (787 1) (R12)   16  History of allergy (V15 09) (Z88 9)   17  Hypercholesterolemia (272 0) (E78 0)   18  Hyperlipidemia (272 4) (E78 5)   19   Kyphoscoliosis (737 30) (M41 9) 20  Limb pain (729 5) (M79 609)   21  Low back pain (724 2) (M54 5)   22  Lumbago (724 2) (M54 5)   23  Lumbar radiculopathy (724 4) (M54 16)   24  Muscle weakness (728 87) (M62 81)   25  Myofascial pain (729 1) (M79 1)   26  Neuropathy (355 9) (G62 9)   27  Numbness (782 0) (R20 0)   28  Opioid dependence (304 00) (F11 20)   29  Other congenital malformations of spine, 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)   30  Pain syndrome, chronic (338 4) (G89 4)   31  Paralysis (344 9) (G83 9)   32  Post laminectomy syndrome (722 80) (M96 1)   33  Postlaminectomy Kyphosis (737 12)   34  Postoperative visit (V58 49) (Z48 89)   35  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)   36  Rib pain on right side (786 50) (R07 81)   37  Ringing In The Ears (Tinnitus) (388 30)   38  Surgery, elective (V50 9) (Z41 9)   39  Thoracic disc herniation (722 11) (M51 24)   40  Thoracic spinal stenosis (724 01) (M48 04)   41  Thoracic spondylosis with myelopathy (721 41) (M47 14)   42  TMJ arthralgia (524 62) (M26 62)   43  Tobacco use (305 1) (Z72 0)   44  Trigeminal neuralgia (350 1) (G50 0)    Current Meds   1  Atorvastatin Calcium 80 MG Oral Tablet; Therapy: (Recorded:21Mar2014) to Recorded   2  Baclofen 10 MG Oral Tablet; TAKE 1 TABLET 3 TIMES DAILY AS NEEDED FOR MUSCLE   SPASM  Requested for: 84TJC8132; Last Rx:54Tpv4890 Ordered   3  Chantix Continuing Month Anthony 1 MG Oral Tablet; TAKE 1 TABLET TWICE DAILY   Recorded   4  Cialis 20 MG Oral Tablet; Therapy: (Recorded:21Mar2014) to Recorded   5  Gabapentin 400 MG Oral Capsule; Take 1 capsule twice daily; Therapy: (Recorded:03Sep2015) to Recorded   6  Gabapentin 600 MG Oral Tablet; Take 1 tablet twice daily; Therapy: (Recorded:03Sep2015) to Recorded   7  Gabapentin 800 MG Oral Tablet; Take 1 tablet twice daily; Therapy: (Recorded:03Sep2015) to Recorded   8   Lidocaine 5 % External Patch (Lidoderm); APPLY 1 PATCH TO THE AFFECTED AREA   AND LEAVE IN PLACE FOR 12 HOURS, THEN REMOVE AND LEAVE OFF FOR 12   HOURS  Requested for: 83LSD6878; Last Rx:48Xor4728 Ordered   9  Lisinopril 2 5 MG Oral Tablet; TAKE 1 TABLET DAILY; Therapy: (Recorded:93Vkl8284) to Recorded   10  MetFORMIN HCl - 1000 MG Oral Tablet; TAKE 1 TABLET TWICE DAILY; Therapy: (Recorded:35Vvu7370) to Recorded   11  Multi-Vitamin Gummies CHEW;    Therapy: (Recorded:87Pps4516) to Recorded   12  Naproxen 500 MG Oral Tablet; TAKE 1 TABLET TWICE DAILY AS NEEDED; Therapy: (Recorded:39Tjq1461) to Recorded   13  Nucynta  MG Oral Tablet Extended Release 12 Hour; Take 1 tablet every 12    hours; Therapy: 51FFV7471 to (Evaluate:53Stq7550); Last Rx:40Vqw3925 Ordered   14  Omeprazole 40 MG Oral Capsule Delayed Release; Therapy: 66KMH8750 to Recorded   15  Oxycodone-Acetaminophen  MG Oral Tablet; TAKE 1 TABLET EVERY 8 HOURS    AS NEEDED FOR PAIN;    Therapy: 26MPX7875 to (Evaluate:64Gmw7918); Last Rx:86Izq0002 Ordered   16  Polyethylene Glycol 3350 Oral Packet; MIX 1 PACKET IN 8 OUNCES OF LIQUID AND    DRINK ONCE DAILY; Therapy: 39WAE2550 to (Evaluate:57Mmv5912)  Requested for: 56MUC9707; Last    Rx:55Ftu0035 Ordered   17  Trileptal 600 MG Oral Tablet (OXcarbazepine); TAKE 2 TABLETS TWICE DAILY; Therapy: (Recorded:64Kym8156) to Recorded   18  Voltaren 1 % Transdermal Gel (Diclofenac Sodium); Therapy: 76Sdp5397 to (Evaluate:18Sbh1883) Recorded   19  Zanaflex 4 MG Oral Tablet (TiZANidine HCl); TAKE 3 TABLET 3 times daily; Therapy: (Recorded:09Qed5642) to Recorded    Allergies    1  Sulfa Drugs    Signatures   Electronically signed by :  Pricilla Dash, ; Binh  3 2016  9:21AM EST                       (Author)

## 2018-01-16 NOTE — MISCELLANEOUS
Message   Recorded as Task   Date: 10/03/2016 01:29 PM, Created By: Bernardo Fabian   Task Name: Follow Up   Assigned To: [facility]   Regarding Patient: Gracie Ca, Status: In Progress   Alysoncora Erica - 03 Oct 2016 1:29 PM     TASK CREATED  Caller: Self; General Medical Question; [phone] (Home); [phone] x,,,,, (Work)  Received VM from pt  on [facility] triage line from 1253 pm  Pt  was referencing some type or note or order provided to him, and saying that someone will not accept it  Pt  states that it is written for three times a wk for 9 days, but he needs it written for 5 days and refax  Message very confusing and unclear  Pt  can be reached at [phone]  Trinity Bey - 10 Oct 2016 2:50 PM     TASK EDITED  Attempted to reach pt  LMOM for pt  to c/b  Trinity Bey - 03 Oct 2016 2:50 PM     TASK IN PROGRESS   Trinity Bey - 04 Oct 2016 11:02 AM     TASK EDITED  Received VM from pt  on [facility] triage line from 1026 am  Pt  states that he was returning our call, can be reached at work at [phone]  Trinity Bey - 04 Oct 2016 11:06 AM     TASK EDITED  Attempted to reach pt  on work phone  Call dropped two times, one ring and then dial tone  Attempted to reach pt  on home/cell phone  Left detailed message, per release, advising that I attempted work # and that I was just trying to obtain more information about the message he had left yesterday  Pt  was advised to c/b  Trinity Bey - 04 Oct 2016 11:06 AM     TASK IN PROGRESS   Bernardo Fabian - 04 Oct 2016 12:39 PM     TASK EDITED  [facility]  Pt  states that he is referring to his FMLA form  Pt  states that Question number three states that 3 episodes up to 72 hours, per pt  he said this would total out to 9 days  Pt  is requesting a change to 5 days, 1 to 24 hours  Pt  states that once this change is made the FMLA will be approved       Pt  requesting refax to [phone]    **Question three is located under Part C: Amount and Type(s) of Leave Needed  **    Please advise  Thank you  Balaji Zazueta - 04 Oct 2016 2:02 PM     TASK REPLIED TO: Previously Assigned To Balaji Zazueta  We have made changes, but it has not gone through yet  Eliezer Morris will try again  Yany Thakkar - 05 Oct 2016 10:12 AM     TASK EDITED    Pt aware  Active Problems    1  Analgesic use (V58 69) (Z79 899)   2  Arthritis (716 90) (M19 90)   3  Back pain (724 5) (M54 9)   4  Cervical radiculopathy (723 4) (M54 12)   5  Constipation (564 00) (K59 00)   6  Degenerative disc disease, lumbar (722 52) (M51 36)   7  Diabetes Mellitus (250 00)   8  Dysphagia (787 20) (R13 10)   9  Encounter for removal of staples (V58 32) (Z48 02)   10  Eye problems (V41 1) (H57 9)   11  Facial pain (784 0) (R51)   12  Fatigue (780 79) (R53 83)   13  Feeling weak (780 79) (R53 1)   14  Gait disturbance (781 2) (R26 9)   15  Heart burn (787 1) (R12)   16  History of allergy (V15 09) (Z88 9)   17  Hypercholesterolemia (272 0) (E78 00)   18  Hyperlipidemia (272 4) (E78 5)   19  Kyphoscoliosis (737 30) (M41 9)   20  Limb pain (729 5) (M79 609)   21  Low back pain (724 2) (M54 5)   22  Lumbago (724 2) (M54 5)   23  Lumbar radiculopathy (724 4) (M54 16)   24  Muscle weakness (728 87) (M62 81)   25  Myofascial pain (729 1) (M79 1)   26  Neuropathy (355 9) (G62 9)   27  Numbness (782 0) (R20 0)   28  Opioid dependence (304 00) (F11 20)   29  Other congenital malformations of spine, 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)   30  Other spondylosis with myelopathy, thoracic region (721 41) (M47 14)   31  Pain syndrome, chronic (338 4) (G89 4)   32  Paralysis (344 9) (G83 9)   33  Post laminectomy syndrome (722 80) (M96 1)   34  Postlaminectomy Kyphosis (737 12)   35  Postoperative visit (V58 49) (Z48 89)   36  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)   37  Rib pain on right side (786 50) (R07 81)   38  Ringing In The Ears (Tinnitus) (388 30)   39  Surgery, elective (V50 9) (Z41 9)   40  Thoracic disc herniation (722 11) (M51 24)   41  Thoracic spinal stenosis (724 01) (M48 04)   42  TMJ arthralgia (524 62) (M26 629)   43  Tobacco use (305 1) (Z72 0)   44  Trigeminal neuralgia (350 1) (G50 0)   45  Trigger finger of both hands (727 03) (M65 30)    Current Meds   1  Atorvastatin Calcium 80 MG Oral Tablet; Therapy: (Recorded:21Mar2014) to Recorded   2  Baclofen 10 MG Oral Tablet; TAKE 1 TABLET 3 TIMES DAILY AS NEEDED FOR MUSCLE   SPASM  Requested for: 46YON3831; Last Rx:23Sep2016 Ordered   3  Chantix Continuing Month Anthony 1 MG Oral Tablet; TAKE 1 TABLET TWICE DAILY   Recorded   4  Cialis 20 MG Oral Tablet; Therapy: (Recorded:21Mar2014) to Recorded   5  Gabapentin 400 MG Oral Capsule; Take 1 capsule twice daily; Therapy: (Recorded:03Sep2015) to Recorded   6  Gabapentin 600 MG Oral Tablet; Take 1 tablet twice daily; Therapy: (Recorded:03Sep2015) to Recorded   7  Gabapentin 800 MG Oral Tablet; Take 1 tablet twice daily; Therapy: (Recorded:03Sep2015) to Recorded   8  Hydrocodone-Acetaminophen 5-325 MG Oral Tablet; TAKE 1 TABLET Every 8 hours; Therapy: 21ANN9211 to (Evaluate:23Oct2016); Last Rx:23Sep2016 Ordered   9  Lidocaine 5 % External Patch (Lidoderm); APPLY 1 PATCH TO THE AFFECTED AREA   AND LEAVE IN PLACE FOR 12 HOURS, THEN REMOVE AND LEAVE OFF FOR 12   HOURS  Requested for: 23Sep2016; Last Rx:23Sep2016 Ordered   10  Lisinopril 2 5 MG Oral Tablet; TAKE 1 TABLET DAILY; Therapy: (Recorded:10Sep2013) to Recorded   11  MetFORMIN HCl - 1000 MG Oral Tablet; TAKE 1 TABLET TWICE DAILY; Therapy: (Recorded:10Sep2013) to Recorded   12  Multi-Vitamin Gummies CHEW;    Therapy: (Recorded:17Sep2013) to Recorded   13  Nucynta  MG Oral Tablet Extended Release 12 Hour; Take 1 tablet twice daily; Therapy: 81VXQ9121 to (Evaluate:23Oct2016); Last Rx:23Sep2016 Ordered   14   Omeprazole 40 MG Oral Capsule Delayed Release; Therapy: 52SXY4909 to Recorded   15  Polyethylene Glycol 3350 Oral Packet; MIX 1 PACKET IN 8 OUNCES OF LIQUID AND    DRINK ONCE DAILY; Therapy: 83RAI2222 to (Evaluate:23Rfh9417)  Requested for: 28ERL2574; Last    Rx:77Zvf5331 Ordered   16  Trileptal 600 MG Oral Tablet (OXcarbazepine); TAKE 2 TABLETS TWICE DAILY; Therapy: (Recorded:81Nem5079) to Recorded   17  Vimovo 500-20 MG Oral Tablet Delayed Release; TAKE 1 TABLET Twice daily PRN; Therapy: 18SGI2252 to (21 )  Requested for: 15PMR4849; Last    Rx:31Ucl1199 Ordered   18  Voltaren 1 % Transdermal Gel (Diclofenac Sodium); Therapy: 22Apr2015 to (Evaluate:54Ets2388) Recorded   19  Zanaflex 4 MG Oral Tablet (TiZANidine HCl); TAKE 3 TABLET 3 times daily; Therapy: (Recorded:19Jun2015) to Recorded    Allergies    1   Sulfa Drugs    Signatures   Electronically signed by : Juan José Rosales RN; Oct  5 2016 10:12AM EST                       (Author)

## 2018-01-17 NOTE — PROGRESS NOTES
Assessment   1  Cervical radiculopathy (723 4) (M54 12)  2  Pain syndrome, chronic (338 4) (G89 4)  3  Post laminectomy syndrome (722 80) (M96 1)    Plan  Analgesic use, Opioid dependence, Pain syndrome, chronic    · (1) DRUG ABUSE SCREEN, URINE ROUTINE; Status:Active; Requested for:23May2016;   Perform:[facility]; SEV:[id number];VJYLSSB; For:Analgesic use, Opioid   dependence, Pain syndrome, chronic; Ordered By:Balaji Mchugh;   · Procedure Flowsheet; Status:Complete;   Done: 48VVS8458 03:47PM  Performed: In Office; ZWT:[id number]; Last Updated Jose Armando Malin; 5/23/2016 3:47:26   PM;Ordered; For:Analgesic use, Opioid dependence, Pain syndrome, chronic; Ordered   By:Balaji Mchugh;  Back pain, Low back pain, Lumbar radiculopathy, Neuropathy    · Renew: Nucynta  MG Oral Tablet Extended Release 12 Hour; Take 1 tablet every  12 hours  Rx By: Tej Evans; Dispense: 30 Days ; #:60 Tablet Extended Release 12 Hour; Refill:   0;For: Back pain, Low back pain, Lumbar radiculopathy, Neuropathy; KARL = N; Print Rx; Do Not Fill Before: 21Jun2016  Back pain, Pain syndrome, chronic, Postlaminectomy Kyphosis, PMH: Thoracic back  pain    · Renew: Oxycodone-Acetaminophen  MG Oral Tablet; TAKE 1 TABLET EVERY 8  HOURS AS NEEDED FOR PAIN  Rx By: Tej Evans; Dispense: 30 Days ; #:90 Tablet; Refill: 0;For: Back pain, Pain   syndrome, chronic, Postlaminectomy Kyphosis, PMH: Thoracic back pain; KARL = N; Print   Rx; Do Not Fill Before: [id number]  Cervical radiculopathy    · * CT SPINE CERVICAL WO CONTRAST; Status:Need Information - Financial  Authorization; Requested for:23May2016;   Perform:[facility]; YHK:[id number];JWYBGCX; For:Cervical radiculopathy;   Ordered By:Karina Mchugh;    Discussion/Summary    Patient is a 59-year-old male status post T8-T12 decompression and fusion in December 2014  s/p SCS and peripheral stimulator placement    Patient is noticing right arm paresthesia and cramping and trigger fingers of bilateral hands  He is presenting with right arm numbness and trigger fingers and cramping of bilateral hands  Patient has spine pathology in both thoracic and lumbar spine requiring surgery  Patient will continue on his gabapentin and Trileptal for trigeminal neuralgia as prescribed by neurologist     We will increase Nucynta 100 mg ER and oxycodone 10/325 QID prn   He was recommended not to combine with Oxycodone, and if needed, only use 1/2 tab  The risk of opioid medications, including dependence, addiction and tolerance were explained to the patient  The patient understands and agrees to use the medications only as prescribed  I have fully discussed the potential side effects of the medication with the patient, which include, but are not limited to, constipation, drowsiness, addiction, impaired judgment, and risk of fatal overdose if not taken as prescribed  I have warned the patient that sharing medications is a felony  I warned against driving while taking sedating medications  At this point in time, the patient is showing no signs of addiction, abuse, diversion or suicidal ideation  Continue Bowel regimen with Amitiza    Patient comfortable with current program, he will call and return for reprogramming  LSO brace previously ordered Med [facility]     We will order a CT scan of the cervical spine without contrast to evaluate for a spinal pathology  If his trigger finger appears to be isolated to his fingers we will refer to orthopedic  A urine drug screen was collected at today's office visit  The results were appropriate for what was prescribed  The specimen will be sent for confirmatory testing  1  F/u in 6 weeks   Possible side effects of new medications were reviewed with the patient/guardian today  The treatment plan was reviewed with the patient/guardian   The patient/guardian understands and agrees with the treatment plan       1 Amended By: Edel Farah; May 23 2016 4:23 PM EST    Chief Complaint   1  Pain    History of Present Illness  Patient is a 51-year-old male status post T8-T12 decompression and fusion in December 2014  s/p SCS and peripheral stimulator placement  Patient is noticing right arm paresthesia and cramping and trigger fingers of bilateral hands  Continues to experience occasional burning, dull aching, sharp, throbbing, pressure-like, shooting with numbness and pins and needles the low back and bilateral lower extremity  He is noting 50% relief without side effects  Deny side effects  He has noticed some constipation but is on Amitiza  He is currently using oxycodone 10 anywhere from one to 3 -4 tablets a day and Nucynta 50 ER Q12  Continue to use baclofen occasionally and Zanaflex each bedtime  He also continues the baclofen and gabapentin  He continues to have numbness in bilateral extremity  Stanislav Grate presents with complaints of gradual onset of constant episodes of moderate bilateral lower back pain, described as sharp, dull, aching, burning and throbbing, radiating to the bilateral lower extremity  On a scale of 1 to 10, the patient rates the pain as 4  Symptoms are unchanged  Associated symptoms include paresthesias, but no stiffness  Review of Systems    Constitutional: no fever, no recent weight gain and no recent weight loss  Eyes: no double vision and no blurry vision  Cardiovascular: no chest pain, no palpitations and no lower extremity edema  Respiratory: no complaints of shortness of breath and no wheezing  Musculoskeletal: difficulty walking, muscle weakness, joint stiffness and pain in extremity B/L Legs, but no joint swelling, no limb swelling` and no decreased range of motion  Neurological: no dizziness, no difficulty swallowing, no memory loss, no loss of consciousness and no seizures  Gastrointestinal: constipation, but no nausea, no vomiting and no diarrhea     Genitourinary: no difficulty initiating urine stream, no genital pain and no frequent urination  Integumentary: no complaints of skin rash  Psychiatric: no depression  Endocrine: no excessive thirst, no adrenal disease, no hypothyroidism and no hyperthyroidism  Hematologic/Lymphatic: no tendency for easy bruising and no tendency for easy bleeding  ROS reviewed  Active Problems   1  Analgesic use (V58 69) (Z79 899)  2  Arthritis (716 90) (M19 90)  3  Back pain (724 5) (M54 9)  4  Constipation (564 00) (K59 00)  5  Degenerative disc disease, lumbar (722 52) (M51 36)  6  Diabetes Mellitus (250 00)  7  Dysphagia (787 20) (R13 10)  8  Encounter for removal of staples (V58 32) (Z48 02)  9  Eye problems (V41 1) (H57 9)  10  Facial pain (784 0) (R51)  11  Fatigue (780 79) (R53 83)  12  Feeling weak (780 79) (R53 1)  13  Gait disturbance (781 2) (R26 9)  14  Heart burn (787 1) (R12)  15  History of allergy (V15 09) (Z88 9)  16  Hypercholesterolemia (272 0) (E78 0)  17  Hyperlipidemia (272 4) (E78 5)  18  Kyphoscoliosis (737 30) (M41 9)  19  Limb pain (729 5) (M79 609)  20  Low back pain (724 2) (M54 5)  21  Lumbago (724 2) (M54 5)  22  Lumbar radiculopathy (724 4) (M54 16)  23  Muscle weakness (728 87) (M62 81)  24  Myofascial pain (729 1) (M79 1)  25  Neuropathy (355 9) (G62 9)  26  Numbness (782 0) (R20 0)  27  Opioid dependence (304 00) (F11 20)  28  Other congenital malformations of spine, 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)    (Q76 49)  29  Pain syndrome, chronic (338 4) (G89 4)  30  Paralysis (344 9) (G83 9)  31  Post laminectomy syndrome (722 80) (M96 1)  32  Postlaminectomy Kyphosis (737 12)  33  Postoperative visit (V58 49) (Z48 89)  34  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)  35  Rib pain on right side (786 50) (R07 81)  36  Ringing In The Ears (Tinnitus) (388 30)  37  Surgery, elective (V50 9) (Z41 9)  38  Thoracic disc herniation (722 11) (M51 24)  39  Thoracic spinal stenosis (724 01) (M48 04)  40   Thoracic spondylosis with myelopathy (721 41) (M47 14)  41  TMJ arthralgia (524 62) (M26 62)  42  Tobacco use (305 1) (Z72 0)  43  Trigeminal neuralgia (350 1) (G50 0)    Past Medical History   1  History of Complaint Of Allergic Reaction Seasonal  2  Diabetes Mellitus (250 00)  3  History of GERD (gastroesophageal reflux disease) (530 81) (K21 9)  4  History of heartburn (V12 79) (Z87 898)  5  Hypercholesterolemia (272 0) (E78 0)  6  History of Hypertension (401 9) (I10)  7  History of Paraplegia  8  History of Thoracic back pain (724 1) (M54 6)  9  History of Urinary Tract Infection (V13 02)    The active problems and past medical history were reviewed and updated today  Surgical History   1  History of Back Surgery  2  History of Intracranial Stereotactic Gamma Radiosurgery  3  History of Intracranial Stereotactic Gamma Radiosurgery  4  History of Laminectomy Lumbar  5  History of Rhizotomy  6  History of Rhizotomy  7  History of Spinal Surgery Neurostimulator Implants    The surgical history was reviewed and updated today  Family History  Mother   1  No pertinent family history  Maternal Grandmother   2  Family history of Acute Myocardial Infarction (V17 3)  Paternal Grandmother   3  Family history of Acute Myocardial Infarction (V17 3)  Maternal Grandfather   4  Family history of Suicide Completion  Paternal Grandfather   5  Family history of Acute Myocardial Infarction (V17 3)  Unknown   6  Family history of Leukemia  Family History   7  Family history of Acute Myocardial Infarction (V17 3)  8  Family history of Maternal Grandfather Is   5  Family history of Maternal Grandmother Is   8  Family history of Paternal Grandfather Is   6  Family history of Paternal Grandmother Is   15  Family history of Suicide Completion    The family history was reviewed and updated today         Social History    · Being A Social Drinker   · Current Every Day Smoker   · Denied: History of Drug Use   · Marital History - Single   · Occupation:   · Tobacco use (305 1) (Z72 0)  The social history was reviewed and updated today  The social history was reviewed and is unchanged  Current Meds  1  Atorvastatin Calcium 80 MG Oral Tablet; Therapy: (Recorded:21Mar2014) to Recorded  2  Baclofen 10 MG Oral Tablet; TAKE 1 TABLET 3 TIMES DAILY AS NEEDED FOR MUSCLE   SPASM  Requested for: 84LNL3902; Last Rx:02Feb2016 Ordered  3  Chantix Continuing Month Anthony 1 MG Oral Tablet; TAKE 1 TABLET TWICE DAILY Recorded  4  Cialis 20 MG Oral Tablet; Therapy: (Recorded:21Mar2014) to Recorded  5  Gabapentin 400 MG Oral Capsule; Take 1 capsule twice daily; Therapy: (Recorded:03Sep2015) to Recorded  6  Gabapentin 600 MG Oral Tablet; Take 1 tablet twice daily; Therapy: (Recorded:03Sep2015) to Recorded  7  Gabapentin 800 MG Oral Tablet; Take 1 tablet twice daily; Therapy: (Recorded:03Sep2015) to Recorded  8  Lidocaine 5 % External Patch; APPLY 1 PATCH TO THE AFFECTED AREA AND LEAVE IN   PLACE FOR 12 HOURS, THEN REMOVE AND LEAVE OFF FOR 12 HOURS  Requested   for: 91MGX0923; Last Rx:02Feb2016 Ordered  9  Lisinopril 2 5 MG Oral Tablet; TAKE 1 TABLET DAILY; Therapy: (Recorded:69Lko3151) to Recorded  10  MetFORMIN HCl - 1000 MG Oral Tablet; TAKE 1 TABLET TWICE DAILY; Therapy: (Recorded:75Cih8836) to Recorded  11  Multi-Vitamin Gummies CHEW;    Therapy: (Recorded:72Gon9410) to Recorded  12  Naproxen 500 MG Oral Tablet; TAKE 1 TABLET TWICE DAILY AS NEEDED; Therapy: (Recorded:45Wiv0788) to Recorded  13  Nucynta ER 50 MG Oral Tablet Extended Release 12 Hour; take 1 tablet every twelve    hours; Therapy: 01IWR3570 to (Evaluate:70Tzs3891); Last Rx:28Mar2016 Ordered  14  Omeprazole 40 MG Oral Capsule Delayed Release; Therapy: 02IGU1422 to Recorded  15  Oxycodone-Acetaminophen  MG Oral Tablet; TAKE 1 TABLET EVERY 6 HOURS    DAILY; Therapy: 28ADP0507 to (Evaluate:59Oad6669); Last Rx:28Mar2016 Ordered  16   Polyethylene Glycol 3350 Oral Packet; MIX 1 PACKET IN 8 OUNCES OF LIQUID AND    DRINK ONCE DAILY; Therapy: 42SKZ6412 to (Evaluate:16Uwf5399)  Requested for: 27QSU4592; Last    Rx:91Ecs2491 Ordered  17  Trileptal 600 MG Oral Tablet; TAKE 2 TABLETS TWICE DAILY; Therapy: (Recorded:33Opj8354) to Recorded  18  Voltaren 1 % Transdermal Gel; Therapy: 22Apr2015 to (Evaluate:69Xlz9579) Recorded  19  Zanaflex 4 MG Oral Tablet; TAKE 3 TABLET 3 times daily; Therapy: (Recorded:50Usw6161) to Recorded    The medication list was reviewed and updated today  Allergies   1  Sulfa Drugs    Physical Exam    Constitutional   General appearance: Well developed, well nourished, alert, in no distress, non-toxic and no overt pain behavior  Eyes   Sclera: anicteric   HEENT   Hearing grossly intact  Neck   Neck: Supple, symmetric, trachea midline, no masses  Pulmonary   Respiratory effort: Even and unlabored  Cardiovascular   Examination of extremities: No edema or pitting edema present  Skin   Skin and subcutaneous tissue: Normal without rashes or lesions, well hydrated  Slight erythema at sites of incistion, no active drainage appreciated  Psychiatric   Mood and affect: Mood and affect appropriate  Neurologic   Cranial nerves: Cranial nerves II-XII grossly intact  Musculoskeletal   Gait and station: Normal     Cervical Spine examination demonstrates  Negative Spurling's, negative Hoffmans  No significant motor or sensory deficits appreciated on cardiac exam  He can muscle stretch reflexes  Hyperlordosis of the cervical spine noted  Lumbar/Sacral Spine examination demonstrates  4/5 b/l LE motor exam       Results/Data  Encounter Results   Procedure Flowsheet [id number] 03:47PM Adi Welch     Test Name Result Flag Reference   Urine Drug Screen Performed Date 76HXP8069       Results Free Text Form [facility]:   Results     CT Scan   CT Thoracic Spine 6/26/15     * CT Spine Thoracic Without Contrast Final    No Documents Attached          --------------------------------------------------------------------------------   [address] [facility];;[place];[address]   06/26/2015 1435   06/26/2015 1445   N/A       THORACIC MYELOGRAM     INDICATION- Previous T10 level laminectomy and resection of calcified   intradural mass  Posterior fusion T8-T12, progressive, right greater   than left lower extremity weakness     COMPARISON- CT 4/16/2015   FLUORO TIME- 1 7 minutes     TECHNIQUE-   After fully explaining the risks, benefits and alternatives of the   procedure to the patient, consent was obtained  The skin overlying the lumbar spine was prepped and draped in the usual   sterile fashion  1% lidocaine was infiltrated at the puncture site  Under fluoroscopic guidance a 22 gauge spinal needle was inserted into   the thecal sac at the L L2-L3 interlaminar space  10 cc of Omnipaque 240 contrast was injected into the thecal sac under   fluoroscopy  The needle was removed  Post myelogram plain films and CT were obtained  No post-procedure complications  The patient was given appropriate   instructions  IMAGE QUALITY-   Diagnostic  FINDINGS-     POST MYELOGRAPHIC PLAIN FILMS-   Contrast flows right only in a headdown position to the cervical   thoracic junction  Lateral films demonstrate mild, ventral extradural   defect at the T10-T11 level  POST MYELOGRAPHIC CT    ALIGNMENT-   Posterior fusion with bilateral pedicle screws T8, T9, T11 and T12  Corpectomy with interbody vertically oriented graft T10  Endplate   changes in T9 and T11 reflecting this graft  Extent of anterior track   of T8 pedicle screws is 5 mm less than contiguous levels  The cord is not compressed at any level  However, cord is distorted   with evidence for volume loss from the mid T10 to approximately the mid   T11 level  The cord may be rotated with volume loss and contour   abnormality   Findings presumably related to chronic compression  The   fluid does not with certainty collected within the cord on the   immediate post-myelographic images  Conus terminates at the L1-L2   level, and a normal fashion  OSSEOUS STRUCTURES-   Right T10 corpectomy     DEGENERATIVE CHANGES-   Multilevel degenerative changes  Facet arthrosis to varying degrees at   nearly every level of the thoracic spine  Small left paramedian T7-T8   protrusion  Osteophytic spurring arising from the ligamenta   flava/facet joint on the left at the T8-T9 and T9-T10 facet joints  There is also a small anterior spur from the superior margin of T11  Leftward burning from the facet at T11-T12  There is produces minor   deformity of the left dorsal aspect of the thecal sac  PREVERTEBRAL AND PARASPINAL SOFT TISSUES-   Normal      VISUALIZED LUNG BREWSTER-   Pleural-parenchymal disease on the right at the operative site,   improved since exam 6 months earlier  , Documented on chest x-ray   12/19/2014     IMPRESSION-     Surgical hardware intact from the T8 to the T12 level, right-sided   corpectomy T10, interbody graft  No compressive cord disease  Cord is   distorted and mildly atrophic at the T10-T11 level likely, related to   remote, chronic compression  Multiple osteophytic spurs deform the   thecal sac without cord compression         Transcribed on- 45 LIZA Cline MD   Reading Radiologist- LIZA Fuentes MD   Electronically Signed- LIZA Fuentes MD   Released Date Time- 06/26/15 1516   ------------------------------------------------------------------------------   [id number]^MAEGAN METZ   [id number]^MAEGAN METZ         Ordered by: Constance Ramires Collected/Examined: 03NSD4144 02:43PM   Verified by: Constance Ramires 77DXS4015 04:36PM   Result Communication: No patient communication needed at this time;  Stage: Final   Performed at: [facility]: [id number] 03:14PM Last Updated: 53SCZ0694 04: 36PM Accession: [id number]  Other  Last dose of Nucynta was on 5/22/16  Last dose of Hydrocodone--Ace was on 5/23/16        Future Appointments    Date/Time Provider Specialty Site   07/01/2016 07:45 AM Nelly Lindsey MD Pain Management [address]     Signatures   Electronically signed by : Symone Pina MD; May 23 2016  4:22PM EST                       (Author)    Electronically signed by : Symone Pina MD; May 23 2016  4:23PM EST                       (Author)

## 2018-01-23 VITALS
BODY MASS INDEX: 29.18 KG/M2 | HEIGHT: 69 IN | WEIGHT: 197 LBS | SYSTOLIC BLOOD PRESSURE: 134 MMHG | DIASTOLIC BLOOD PRESSURE: 90 MMHG | HEART RATE: 94 BPM | TEMPERATURE: 99 F

## 2018-02-14 ENCOUNTER — OFFICE VISIT (OUTPATIENT)
Dept: PAIN MEDICINE | Facility: CLINIC | Age: 53
End: 2018-02-14
Payer: COMMERCIAL

## 2018-02-14 VITALS
HEART RATE: 101 BPM | WEIGHT: 197 LBS | SYSTOLIC BLOOD PRESSURE: 139 MMHG | DIASTOLIC BLOOD PRESSURE: 87 MMHG | BODY MASS INDEX: 29.18 KG/M2 | HEIGHT: 69 IN | TEMPERATURE: 98.5 F

## 2018-02-14 DIAGNOSIS — M48.04 THORACIC SPINAL STENOSIS: ICD-10-CM

## 2018-02-14 DIAGNOSIS — F11.20 UNCOMPLICATED OPIOID DEPENDENCE (HCC): ICD-10-CM

## 2018-02-14 DIAGNOSIS — G89.12 POST-THORACOTOMY PAIN: ICD-10-CM

## 2018-02-14 DIAGNOSIS — M47.14 OTHER SPONDYLOSIS WITH MYELOPATHY, THORACIC REGION: ICD-10-CM

## 2018-02-14 DIAGNOSIS — M96.3 KYPHOSIS, POSTLAMINECTOMY: ICD-10-CM

## 2018-02-14 DIAGNOSIS — M54.40 CHRONIC BILATERAL LOW BACK PAIN WITH SCIATICA, SCIATICA LATERALITY UNSPECIFIED: Primary | ICD-10-CM

## 2018-02-14 DIAGNOSIS — M51.24 THORACIC DISC HERNIATION: ICD-10-CM

## 2018-02-14 DIAGNOSIS — G62.9 NEUROPATHY: ICD-10-CM

## 2018-02-14 DIAGNOSIS — M79.18 MYOFASCIAL PAIN: ICD-10-CM

## 2018-02-14 DIAGNOSIS — Z79.891 ENCOUNTER FOR LONG-TERM OPIATE ANALGESIC USE: ICD-10-CM

## 2018-02-14 DIAGNOSIS — R07.81 RIB PAIN ON RIGHT SIDE: ICD-10-CM

## 2018-02-14 DIAGNOSIS — M96.1 POST LAMINECTOMY SYNDROME: ICD-10-CM

## 2018-02-14 DIAGNOSIS — M54.16 CHRONIC LUMBAR RADICULOPATHY: ICD-10-CM

## 2018-02-14 DIAGNOSIS — G89.4 PAIN SYNDROME, CHRONIC: ICD-10-CM

## 2018-02-14 DIAGNOSIS — G89.29 CHRONIC BILATERAL LOW BACK PAIN WITH SCIATICA, SCIATICA LATERALITY UNSPECIFIED: Primary | ICD-10-CM

## 2018-02-14 PROCEDURE — 80305 DRUG TEST PRSMV DIR OPT OBS: CPT | Performed by: NURSE PRACTITIONER

## 2018-02-14 PROCEDURE — 99214 OFFICE O/P EST MOD 30 MIN: CPT | Performed by: NURSE PRACTITIONER

## 2018-02-14 RX ORDER — OXCARBAZEPINE 600 MG/1
TABLET, FILM COATED ORAL
Refills: 2 | COMMUNITY
Start: 2018-01-24

## 2018-02-14 RX ORDER — HYDROCODONE BITARTRATE AND ACETAMINOPHEN 5; 325 MG/1; MG/1
TABLET ORAL
COMMUNITY
Start: 2016-09-23 | End: 2018-02-14 | Stop reason: SDUPTHER

## 2018-02-14 RX ORDER — BACLOFEN 10 MG/1
TABLET ORAL
Qty: 30 TABLET | Refills: 1 | Status: SHIPPED | OUTPATIENT
Start: 2018-02-14 | End: 2018-05-09 | Stop reason: SDUPTHER

## 2018-02-14 RX ORDER — LIDOCAINE 50 MG/G
1 PATCH TOPICAL
COMMUNITY

## 2018-02-14 RX ORDER — HYDROCODONE BITARTRATE AND ACETAMINOPHEN 5; 325 MG/1; MG/1
1 TABLET ORAL 2 TIMES DAILY PRN
Qty: 55 TABLET | Refills: 0 | Status: SHIPPED | OUTPATIENT
Start: 2018-02-14 | End: 2018-05-09 | Stop reason: SDUPTHER

## 2018-02-14 RX ORDER — ATORVASTATIN CALCIUM 80 MG/1
TABLET, FILM COATED ORAL
COMMUNITY

## 2018-02-14 RX ORDER — AMITRIPTYLINE HYDROCHLORIDE 25 MG/1
TABLET, FILM COATED ORAL
COMMUNITY
Start: 2017-12-06 | End: 2018-08-15

## 2018-02-14 RX ORDER — GABAPENTIN 600 MG/1
1 TABLET ORAL 2 TIMES DAILY
COMMUNITY

## 2018-02-14 RX ORDER — HYDROCODONE BITARTRATE AND ACETAMINOPHEN 5; 325 MG/1; MG/1
1 TABLET ORAL 2 TIMES DAILY PRN
Qty: 55 TABLET | Refills: 0 | Status: SHIPPED | OUTPATIENT
Start: 2018-02-14 | End: 2018-02-14 | Stop reason: SDUPTHER

## 2018-02-14 RX ORDER — BACLOFEN 10 MG/1
TABLET ORAL
COMMUNITY
End: 2018-02-14 | Stop reason: SDUPTHER

## 2018-02-14 RX ORDER — NAPROXEN AND ESOMEPRAZOLE MAGNESIUM 500; 20 MG/1; MG/1
TABLET, DELAYED RELEASE ORAL
COMMUNITY
Start: 2017-12-08 | End: 2018-02-14 | Stop reason: SDUPTHER

## 2018-02-14 RX ORDER — GABAPENTIN 800 MG/1
1 TABLET ORAL 2 TIMES DAILY
COMMUNITY

## 2018-02-14 RX ORDER — NAPROXEN AND ESOMEPRAZOLE MAGNESIUM 500; 20 MG/1; MG/1
1 TABLET, DELAYED RELEASE ORAL 2 TIMES DAILY PRN
Qty: 60 TABLET | Refills: 1 | Status: SHIPPED | OUTPATIENT
Start: 2018-02-14 | End: 2018-05-09 | Stop reason: SDUPTHER

## 2018-02-14 RX ORDER — NORTRIPTYLINE HYDROCHLORIDE 25 MG/1
25 CAPSULE ORAL
COMMUNITY
Start: 2017-10-25 | End: 2018-08-15

## 2018-02-14 RX ORDER — LISINOPRIL 2.5 MG/1
1 TABLET ORAL DAILY
COMMUNITY
End: 2018-05-09
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
## 2018-02-16 ENCOUNTER — TELEPHONE (OUTPATIENT)
Dept: PAIN MEDICINE | Facility: CLINIC | Age: 53
End: 2018-02-16

## 2018-02-16 NOTE — TELEPHONE ENCOUNTER
S/w the patient this am and he was appreciative of the effort and he stated he will  the letter sometime today in [place]   Thanks

## 2018-02-16 NOTE — TELEPHONE ENCOUNTER
Can you call the patient to let him know I faxed the letter that he picked up a copy of to [employer] as requested  Also please let him know that I was able to get his [address] until 2/16/19  Thank you

## 2018-02-16 NOTE — TELEPHONE ENCOUNTER
Can you please call the patient and advise him that I have the letter ready for his employer as we discussed at his [address] on Wednesday  Does he want me to fax it to them, mail it, or does he want to pick it up at the [facility] office any time or it can be available for  at the [place] office on Wednesday 2/21/18  Let me know     Thank You

## 2018-05-09 ENCOUNTER — OFFICE VISIT (OUTPATIENT)
Dept: PAIN MEDICINE | Facility: CLINIC | Age: 53
End: 2018-05-09
Payer: COMMERCIAL

## 2018-05-09 VITALS
HEIGHT: 69 IN | BODY MASS INDEX: 29.33 KG/M2 | HEART RATE: 104 BPM | TEMPERATURE: 98.6 F | SYSTOLIC BLOOD PRESSURE: 137 MMHG | DIASTOLIC BLOOD PRESSURE: 90 MMHG | WEIGHT: 198 LBS

## 2018-05-09 DIAGNOSIS — M79.18 MYOFASCIAL PAIN: ICD-10-CM

## 2018-05-09 DIAGNOSIS — M54.16 CHRONIC LUMBAR RADICULOPATHY: ICD-10-CM

## 2018-05-09 DIAGNOSIS — R26.9 GAIT DISTURBANCE: ICD-10-CM

## 2018-05-09 DIAGNOSIS — M54.40 CHRONIC BILATERAL LOW BACK PAIN WITH SCIATICA, SCIATICA LATERALITY UNSPECIFIED: Primary | ICD-10-CM

## 2018-05-09 DIAGNOSIS — M96.3 KYPHOSIS, POSTLAMINECTOMY: ICD-10-CM

## 2018-05-09 DIAGNOSIS — G89.29 CHRONIC BILATERAL LOW BACK PAIN WITH SCIATICA, SCIATICA LATERALITY UNSPECIFIED: Primary | ICD-10-CM

## 2018-05-09 DIAGNOSIS — M51.36 DEGENERATIVE DISC DISEASE, LUMBAR: ICD-10-CM

## 2018-05-09 DIAGNOSIS — M96.1 POST LAMINECTOMY SYNDROME: ICD-10-CM

## 2018-05-09 DIAGNOSIS — G89.4 PAIN SYNDROME, CHRONIC: ICD-10-CM

## 2018-05-09 DIAGNOSIS — M48.04 THORACIC SPINAL STENOSIS: ICD-10-CM

## 2018-05-09 PROCEDURE — 99214 OFFICE O/P EST MOD 30 MIN: CPT | Performed by: NURSE PRACTITIONER

## 2018-05-09 RX ORDER — BACLOFEN 10 MG/1
TABLET ORAL
Qty: 30 TABLET | Refills: 2 | Status: SHIPPED | OUTPATIENT
Start: 2018-05-09 | End: 2018-08-15 | Stop reason: SDUPTHER

## 2018-05-09 RX ORDER — HYDROCODONE BITARTRATE AND ACETAMINOPHEN 5; 325 MG/1; MG/1
1 TABLET ORAL 2 TIMES DAILY PRN
Qty: 55 TABLET | Refills: 0 | Status: SHIPPED | OUTPATIENT
Start: 2018-05-09 | End: 2018-05-09 | Stop reason: SDUPTHER

## 2018-05-09 RX ORDER — HYDROCODONE BITARTRATE AND ACETAMINOPHEN 5; 325 MG/1; MG/1
TABLET ORAL
Qty: 55 TABLET | Refills: 0 | Status: SHIPPED | OUTPATIENT
Start: 2018-05-09 | End: 2018-05-09 | Stop reason: SDUPTHER

## 2018-05-09 RX ORDER — NAPROXEN AND ESOMEPRAZOLE MAGNESIUM 500; 20 MG/1; MG/1
1 TABLET, DELAYED RELEASE ORAL 2 TIMES DAILY PRN
Qty: 60 TABLET | Refills: 2 | Status: SHIPPED | OUTPATIENT
Start: 2018-05-09 | End: 2018-08-15 | Stop reason: SDUPTHER

## 2018-05-09 RX ORDER — HYDROCODONE BITARTRATE AND ACETAMINOPHEN 5; 325 MG/1; MG/1
TABLET ORAL
Qty: 50 TABLET | Refills: 0 | Status: SHIPPED | OUTPATIENT
Start: 2018-05-09 | End: 2018-08-15 | Stop reason: SDUPTHER

## 2018-05-09 RX ORDER — HYDROCODONE BITARTRATE AND ACETAMINOPHEN 5; 325 MG/1; MG/1
TABLET ORAL
Qty: 50 TABLET | Refills: 0 | Status: SHIPPED | OUTPATIENT
Start: 2018-05-09 | End: 2018-05-09 | Stop reason: SDUPTHER

## 2018-05-09 RX ORDER — LISINOPRIL AND HYDROCHLOROTHIAZIDE 20; 12.5 MG/1; MG/1
1 TABLET ORAL DAILY
Refills: 3 | COMMUNITY
Start: 2018-03-27

## 2018-05-09 NOTE — PROGRESS NOTES
Assessment:  1  Chronic bilateral low back pain with sciatica, sciatica laterality unspecified    2  Myofascial pain    3  Post laminectomy syndrome    4  Pain syndrome, chronic    5  Kyphosis, postlaminectomy    6  Degenerative disc disease, lumbar    7  Chronic lumbar radiculopathy    8  Gait disturbance    9  Thoracic spinal stenosis        Plan:  While the patient was in the office today, I did have a thorough conversation with the patient regarding his medication regimen and treatment plan  I explained to the patient at this point time I feel would be in his best interest to continue to try to slowly and steadily titrate down the opiate medications and for now we will continue the Nucynta ER as prescribed, as well as the baclofen, however, for the p r n  Norco for the 2nd and 3rd month prescriptions I am going to decrease the total amount down to just 50 pills for 30 days  We will continue to try to slowly and steadily titrate down the opioid medications as far as possible in the future  The patient was agreeable and verbalized an understanding  [place] Prescription Drug Monitoring Program report was reviewed and was appropriate      The patient did not have their opioid medications available for confirmation or counting while in the office today  I reviewed with the patient that as per the opioid contract, they are to bring in the last filled prescription for all of their opioid medications, with what they have left to every office visit  I advised the patient that if they would continue to not bring in their prescriptions as discussed, we may not be able to continue prescribing opioid medications in the future  The patient was agreeable and verbalized an understanding  The patient's opioid scripts were sent to their pharmacy electronically and was given a 3 month supply of prescriptions with a Do Not Fill date(s) of May 29, 2018, June 26, 2018, and July 24, 2018 for the p r n   [place]   The do not fill dates for the [facility] were June 6, 2018, July 5, 2018, and August 2, 2018  There are risks associated with opioid medications, including dependence, addiction and tolerance  The patient understands and agrees to use these medications only as prescribed  Potential side effects of the medications include, but are not limited to, constipation, drowsiness, addiction, impaired judgment and risk of fatal overdose if not taken as prescribed  The patient was warned against driving while taking sedation medications  Sharing medications is a felony  At this point in time, the patient is showing no signs of addiction, abuse, diversion or suicidal ideation  The patient will follow-up in 14 weeks for medication prescription refill and reevaluation  The patient was advised to contact the office should their symptoms worsen in the interim  The patient was agreeable and verbalized an understanding  History of Present Illness: The patient is a 46 y o  male last seen on 2/14/18 who presents for a follow up office visit in regards to chronic pain secondary to post thoracic and lumbar laminectomy syndrome  The patient currently reports that since his last office visit his pain symptoms have been relatively manageable and stable with his medication regimen and treatment plan  Current pain medications includes:  Nucynta  mg b i d , baclofen 10 mg at bedtime, and Norco 5/3251 p o  b i d  p r n  for pain, dispense number 55 pills for 30 days  The patient reports that this regimen is providing 50% pain relief  The patient is reporting no side effects from this pain medication regimen  Pain Contract Signed: 12/06/2017  Last Urine Drug Screen: 2/14/18    I have personally reviewed and/or updated the patient's past medical history, past surgical history, family history, social history, current medications, allergies, and vital signs today         Review of Systems:    Review of Systems Respiratory: Negative for shortness of breath  Cardiovascular: Negative for chest pain  Gastrointestinal: Negative for constipation, diarrhea, nausea and vomiting  Musculoskeletal: Positive for gait problem  Negative for arthralgias, joint swelling and myalgias  Difficulty walking, Decreased ROM, Joint stiffness    Skin: Negative for rash  Neurological: Positive for weakness (muscle )  Negative for dizziness and seizures  All other systems reviewed and are negative  No past medical history on file  No past surgical history on file  No family history on file  Social History     Occupational History    Not on file  Social History Main Topics    Smoking status: Not on file    Smokeless tobacco: Not on file    Alcohol use Not on file    Drug use: Unknown    Sexual activity: Not on file         Current Outpatient Prescriptions:     amitriptyline (ELAVIL) 25 mg tablet, Take by mouth, Disp: , Rfl:     atorvastatin (LIPITOR) 80 mg tablet, Take by mouth, Disp: , Rfl:     baclofen 10 mg tablet, Take 1 PO QD PRN for spasms  , Disp: 30 tablet, Rfl: 1    gabapentin (NEURONTIN) 600 MG tablet, Take 1 tablet by mouth 2 (two) times a day, Disp: , Rfl:     gabapentin (NEURONTIN) 800 mg tablet, Take 1 tablet by mouth 2 (two) times a day, Disp: , Rfl:     HYDROcodone-acetaminophen (NORCO) 5-325 mg per tablet, Take 1 tablet by mouth 2 (two) times a day as needed for pain Max Daily Amount: 2 tablets, Disp: 55 tablet, Rfl: 0    lidocaine (LIDODERM) 5 %, Apply 1 patch topically, Disp: , Rfl:     lisinopril-hydrochlorothiazide (PRINZIDE,ZESTORETIC) 20-12 5 MG per tablet, Take 1 tablet by mouth daily, Disp: , Rfl: 3    metFORMIN (GLUCOPHAGE) 1000 MG tablet, Take 1 tablet by mouth 2 (two) times a day, Disp: , Rfl:     nortriptyline (PAMELOR) 25 mg capsule, Take 25 mg by mouth, Disp: , Rfl:     OXcarbazepine (TRILEPTAL) 600 mg tablet, 2 TABLET(S) BY MOUTH TWO TIMES DAILY, Disp: , Rfl: 2    tapentadol hcl er (NUCYNTA ER) 12 hr tablet 150 mg, Take 1 tablet (150 mg total) by mouth 2 (two) times a day Max Daily Amount: 300 mg, Disp: 60 tablet, Rfl: 0    VIMOVO 500-20 MG TBEC, Take 1 tablet by mouth 2 (two) times a day as needed (for pain), Disp: 60 tablet, Rfl: 1    Allergies   Allergen Reactions    Sulfa Antibiotics Edema     Annotation - [id number]: face and hands redness and swelling       Physical Exam:    /90   Pulse 104   Temp 98 6 °F (37 °C)   Ht 5' 9" (1 753 m)   Wt 89 8 kg (198 lb)   BMI 29 24 kg/m²     Constitutional:normal, well developed, well nourished, alert, in no distress and non-toxic and no overt pain behavior  and overweight  Eyes:anicteric  HEENT:grossly intact  Neck:supple, symmetric, trachea midline and no masses   Pulmonary:even and unlabored  Cardiovascular:No edema or pitting edema present  Skin:Normal without rashes or lesions and well hydrated  Psychiatric:Mood and affect appropriate  Neurologic:Cranial Nerves II-XII grossly intact  Musculoskeletal:Slightly antalgic, but steady gait without the use of any assistive devices  Imaging  No orders to display         No orders of the defined types were placed in this encounter

## 2018-08-15 ENCOUNTER — OFFICE VISIT (OUTPATIENT)
Dept: PAIN MEDICINE | Facility: CLINIC | Age: 53
End: 2018-08-15
Payer: COMMERCIAL

## 2018-08-15 VITALS
WEIGHT: 197 LBS | SYSTOLIC BLOOD PRESSURE: 136 MMHG | DIASTOLIC BLOOD PRESSURE: 93 MMHG | BODY MASS INDEX: 29.09 KG/M2 | HEART RATE: 83 BPM

## 2018-08-15 DIAGNOSIS — M51.24 THORACIC DISC HERNIATION: ICD-10-CM

## 2018-08-15 DIAGNOSIS — M54.16 CHRONIC LUMBAR RADICULOPATHY: Primary | ICD-10-CM

## 2018-08-15 DIAGNOSIS — M54.12 CERVICAL RADICULOPATHY: ICD-10-CM

## 2018-08-15 DIAGNOSIS — M51.36 DEGENERATIVE DISC DISEASE, LUMBAR: ICD-10-CM

## 2018-08-15 DIAGNOSIS — Z79.891 ENCOUNTER FOR LONG-TERM OPIATE ANALGESIC USE: ICD-10-CM

## 2018-08-15 DIAGNOSIS — G89.12 POST-THORACOTOMY PAIN: ICD-10-CM

## 2018-08-15 DIAGNOSIS — M96.1 POST LAMINECTOMY SYNDROME: ICD-10-CM

## 2018-08-15 DIAGNOSIS — M41.9 KYPHOSCOLIOSIS: ICD-10-CM

## 2018-08-15 DIAGNOSIS — G89.4 PAIN SYNDROME, CHRONIC: ICD-10-CM

## 2018-08-15 DIAGNOSIS — M96.3 KYPHOSIS, POSTLAMINECTOMY: ICD-10-CM

## 2018-08-15 DIAGNOSIS — G50.0 TRIGEMINAL NEURALGIA: ICD-10-CM

## 2018-08-15 DIAGNOSIS — R26.9 GAIT DISTURBANCE: ICD-10-CM

## 2018-08-15 DIAGNOSIS — M79.18 MYOFASCIAL PAIN: ICD-10-CM

## 2018-08-15 DIAGNOSIS — G62.9 NEUROPATHY: ICD-10-CM

## 2018-08-15 PROCEDURE — 80305 DRUG TEST PRSMV DIR OPT OBS: CPT | Performed by: NURSE PRACTITIONER

## 2018-08-15 PROCEDURE — 99214 OFFICE O/P EST MOD 30 MIN: CPT | Performed by: NURSE PRACTITIONER

## 2018-08-15 RX ORDER — HYDROCODONE BITARTRATE AND ACETAMINOPHEN 5; 325 MG/1; MG/1
1 TABLET ORAL 2 TIMES DAILY PRN
Qty: 50 TABLET | Refills: 0 | Status: SHIPPED | OUTPATIENT
Start: 2018-10-18 | End: 2018-08-16 | Stop reason: SDUPTHER

## 2018-08-15 RX ORDER — HYDROCODONE BITARTRATE AND ACETAMINOPHEN 5; 325 MG/1; MG/1
1 TABLET ORAL 2 TIMES DAILY PRN
Qty: 50 TABLET | Refills: 0 | Status: SHIPPED | OUTPATIENT
Start: 2018-08-21 | End: 2018-08-15 | Stop reason: SDUPTHER

## 2018-08-15 RX ORDER — NAPROXEN AND ESOMEPRAZOLE MAGNESIUM 500; 20 MG/1; MG/1
1 TABLET, DELAYED RELEASE ORAL 2 TIMES DAILY PRN
Qty: 180 TABLET | Refills: 0 | Status: SHIPPED | OUTPATIENT
Start: 2018-08-15 | End: 2018-11-07 | Stop reason: SDUPTHER

## 2018-08-15 RX ORDER — BACLOFEN 10 MG/1
TABLET ORAL
Qty: 30 TABLET | Refills: 2 | Status: SHIPPED | OUTPATIENT
Start: 2018-08-15 | End: 2018-11-07 | Stop reason: SDUPTHER

## 2018-08-15 RX ORDER — HYDROCODONE BITARTRATE AND ACETAMINOPHEN 5; 325 MG/1; MG/1
1 TABLET ORAL 2 TIMES DAILY PRN
Qty: 50 TABLET | Refills: 0 | Status: SHIPPED | OUTPATIENT
Start: 2018-09-19 | End: 2018-08-15 | Stop reason: SDUPTHER

## 2018-08-15 NOTE — PROGRESS NOTES
Pt is c/o lower back pain that radiates down his legs  Assessment:  1  Chronic lumbar radiculopathy    2  Pain syndrome, chronic    3  Myofascial pain    4  Cervical radiculopathy    5  Neuropathy    6  Trigeminal neuralgia    7  Kyphoscoliosis    8  Kyphosis, postlaminectomy    9  Thoracic disc herniation    10  Degenerative disc disease, lumbar    11  Gait disturbance    12  Post laminectomy syndrome    13  Post-thoracotomy pain    14  Encounter for long-term opiate analgesic use        Plan:  1  While the patient was in the office today, I did discuss his current medication regimen  He does state that he continues to try and titrate down on his opioid medications  He did state that he has been trying to use less of the Nucynta ER, however I did explain to him that we would actually like him to try and wean off or down on the short acting opioid medication, such as the hydrocodone and continue the Nucynta ER every 12 hours  The patient was agreeable and verbalized an understanding  The patient does have a Nucynta  mg prescription refill from last office visit, therefore I will give him another 2 month supply to get him to our next office visit with 1 having a do not fill date of September 14, 2018  2   The patient can continue on Norco as prescribed  I did explain to the patient to try and wean down on the use of this medication over the next 3 months, and we will re-evaluate at the next office visit to see we can continue to decrease the number of pills he gets per month  The patient was agreeable and verbalized an understanding  For now I will represcribed Norco 5/325 mg 1 tablet twice a day as needed for pain dispensed 50 tablets for 30 days  The patient was given a 3 month supply of prescriptions with a Do Not Fill date(s) of August 21, 2018, September 19, 2018, and October 18, 2018      [place] Prescription Drug Monitoring Program report was reviewed and was appropriate     A urine drug screen was collected at today's office visit as part of our medication management protocol  The point of care testing results were appropriate for what was being prescribed  The specimen will be sent for confirmatory testing  The drug screen is medically necessary because the patient is either dependent on opioid medication or is being considered for opioid medication therapy and the results could impact ongoing or future treatment  The drug screen is to evaluate for the presences or absence of prescribed, non-prescribed, and/or illicit drugs/substances  There are risks associated with opioid medications, including dependence, addiction and tolerance  The patient understands and agrees to use these medications only as prescribed  Potential side effects of the medications include, but are not limited to, constipation, drowsiness, addiction, impaired judgment and risk of fatal overdose if not taken as prescribed  The patient was warned against driving while taking sedation medications  Sharing medications is a felony  At this point in time, the patient is showing no signs of addiction, abuse, diversion or suicidal ideation  The patient was selected for a random pill count at todays office visit  The medication was available for the count and the amount present was found to be appropriate according to the date(s) filled and the medication prescription instructions noted on the prescription container  The medication was returned to the patient and the documentation form can be found in the patient's chart  2   The patient is to continue to follow-up with [employer] for reprogramming of his spinal cord stimulators, as he states the last time he has had his stimulator reprogrammed was about a year ago  I did encourage the reprogramming as this may help manage his pain  The patient was agreeable and verbalized an understanding    3   The patient can continue on Baclofen, Vimovo and Gabapentin as prescribed  4   The patient will continue with his home exercise program  5  The patient will follow-up in 12 weeks for medication prescription refill and reevaluation  The patient was advised to contact the office should their symptoms worsen in the interim  The patient was agreeable and verbalized an understanding  History of Present Illness: The patient is a 46 y o  male last seen on 5/9/18 who presents for a follow up office visit in regards to chronic pain secondary to post Thoracic and lumbar laminectomy syndrome  The patient does have a history of a T8-T12 decompression and fusion in December of 2014  The patient does state that he has two [employer] spinal cord stimulators in his back and 1 peripherally for trigeminal neuralgia  He states the last time he had his spinal cord stimulators reprogrammed was over a year ago  The patient currently reports that since our last office visit, his bilateral lower extremity pain does remain relatively stable on his current medication regimen  He does state that he has been trying to wean down on the Nucynta ER and has only been taking 1 a day  He rates his pain a 5/10 on the numeric pain rating scale  He states the pain is constant and is bothersome in the morning and in the evening  He characterizes the pain as burning, dull aching, sharp, throbbing, cramping, pressure like, shooting, numbness and pins and needles  Current pain medications includes:  Nucynta ER 150mg q 12, Norco 5/325 1 tablet b i d  p r n  pain, baclofen 10 mg at bedtime, and Vimovo bid  The patient reports that this regimen is providing 60% pain relief  The patient is reporting no side effects from this pain medication regimen      Pain Contract Signed: 12/6/17  Last Urine Drug Screen:8/15/18    I have personally reviewed and/or updated the patient's past medical history, past surgical history, family history, social history, current medications, allergies, and vital signs today        Review of Systems:    Review of Systems   Respiratory: Negative for shortness of breath  Cardiovascular: Negative for chest pain  Gastrointestinal: Negative for constipation, diarrhea, nausea and vomiting  Musculoskeletal: Negative for arthralgias, gait problem, joint swelling and myalgias  Difficulty walking  Muscle weakness   Skin: Negative for rash  Neurological: Negative for dizziness, seizures and weakness  All other systems reviewed and are negative  No past medical history on file  No past surgical history on file  No family history on file  Social History     Occupational History    Not on file  Social History Main Topics    Smoking status: Not on file    Smokeless tobacco: Not on file    Alcohol use Not on file    Drug use: Unknown    Sexual activity: Not on file         Current Outpatient Prescriptions:     atorvastatin (LIPITOR) 80 mg tablet, Take by mouth, Disp: , Rfl:     baclofen 10 mg tablet, Take 1 PO QD PRN for spasms  , Disp: 30 tablet, Rfl: 2    gabapentin (NEURONTIN) 600 MG tablet, Take 1 tablet by mouth 2 (two) times a day, Disp: , Rfl:     gabapentin (NEURONTIN) 800 mg tablet, Take 1 tablet by mouth 2 (two) times a day, Disp: , Rfl:     [START ON 8/21/2018] HYDROcodone-acetaminophen (NORCO) 5-325 mg per tablet, Take 1 tablet by mouth 2 (two) times a day as needed for pain Max Daily Amount: 2 tablets, Disp: 50 tablet, Rfl: 0    lidocaine (LIDODERM) 5 %, Apply 1 patch topically, Disp: , Rfl:     lisinopril-hydrochlorothiazide (PRINZIDE,ZESTORETIC) 20-12 5 MG per tablet, Take 1 tablet by mouth daily, Disp: , Rfl: 3    metFORMIN (GLUCOPHAGE) 1000 MG tablet, Take 1 tablet by mouth 2 (two) times a day, Disp: , Rfl:     OXcarbazepine (TRILEPTAL) 600 mg tablet, 2 TABLET(S) BY MOUTH TWO TIMES DAILY, Disp: , Rfl: 2    tapentadol hcl er (NUCYNTA ER) 12 hr tablet 150 mg, Take 1 tablet (150 mg total) by mouth every 12 (twelve) hours Max Daily Amount: 300 mg, Disp: 60 tablet, Rfl: 0    VIMOVO 500-20 MG TBEC, Take 1 tablet by mouth 2 (two) times a day as needed (for pain), Disp: 180 tablet, Rfl: 0    Allergies   Allergen Reactions    Sulfa Antibiotics Edema     Annotation - [id number]: face and hands redness and swelling       Physical Exam:    /93   Pulse 83   Wt 89 4 kg (197 lb)   BMI 29 09 kg/m²     Constitutional:normal, well developed, well nourished, alert, in no distress and non-toxic and no overt pain behavior  Eyes:anicteric  HEENT:grossly intact  Neck:supple, symmetric, trachea midline and no masses   Pulmonary:even and unlabored  Cardiovascular:No edema or pitting edema present  Skin:Normal without rashes or lesions and well hydrated  Psychiatric:Mood and affect appropriate  Neurologic:Cranial Nerves II-XII grossly intact  Musculoskeletal:Slightly antalgic but steady without the use of assistive devices  Bilateral lumbar paraspinals tender to palpation  Imaging  No orders to display         No orders of the defined types were placed in this encounter

## 2018-08-16 ENCOUNTER — TELEPHONE (OUTPATIENT)
Dept: PAIN MEDICINE | Facility: MEDICAL CENTER | Age: 53
End: 2018-08-16

## 2018-08-16 DIAGNOSIS — G89.4 PAIN SYNDROME, CHRONIC: ICD-10-CM

## 2018-08-16 RX ORDER — HYDROCODONE BITARTRATE AND ACETAMINOPHEN 5; 325 MG/1; MG/1
1 TABLET ORAL 2 TIMES DAILY PRN
Qty: 50 TABLET | Refills: 0 | Status: SHIPPED | OUTPATIENT
Start: 2018-09-19 | End: 2018-08-16 | Stop reason: SDUPTHER

## 2018-08-16 RX ORDER — HYDROCODONE BITARTRATE AND ACETAMINOPHEN 5; 325 MG/1; MG/1
1 TABLET ORAL 2 TIMES DAILY PRN
Qty: 50 TABLET | Refills: 0 | Status: SHIPPED | OUTPATIENT
Start: 2018-10-18 | End: 2018-11-07 | Stop reason: SDUPTHER

## 2018-08-16 RX ORDER — HYDROCODONE BITARTRATE AND ACETAMINOPHEN 5; 325 MG/1; MG/1
1 TABLET ORAL 2 TIMES DAILY PRN
Qty: 50 TABLET | Refills: 0 | Status: SHIPPED | OUTPATIENT
Start: 2018-08-21 | End: 2018-08-16 | Stop reason: SDUPTHER

## 2018-08-16 NOTE — TELEPHONE ENCOUNTER
I apologize for the inconvenience  [facility] as requested, thank you

## 2018-08-16 NOTE — TELEPHONE ENCOUNTER
Please reorder meds as requested below and send to [facility] pharmacy on file  Will call [facility] and cancel orders there

## 2018-08-16 NOTE — TELEPHONE ENCOUNTER
Phone call from patient stating that the Hydrocodone and nucynta were sent to the wrong pharmacy  Patient is requesting that medications be sent to [facility] pharmacy [address]  Patient can be reached at [phone]

## 2018-11-07 ENCOUNTER — TELEPHONE (OUTPATIENT)
Dept: PAIN MEDICINE | Facility: CLINIC | Age: 53
End: 2018-11-07

## 2018-11-07 ENCOUNTER — OFFICE VISIT (OUTPATIENT)
Dept: PAIN MEDICINE | Facility: CLINIC | Age: 53
End: 2018-11-07
Payer: COMMERCIAL

## 2018-11-07 VITALS
DIASTOLIC BLOOD PRESSURE: 89 MMHG | HEART RATE: 85 BPM | SYSTOLIC BLOOD PRESSURE: 140 MMHG | BODY MASS INDEX: 28.88 KG/M2 | WEIGHT: 195 LBS | HEIGHT: 69 IN

## 2018-11-07 DIAGNOSIS — M79.18 MYOFASCIAL PAIN: ICD-10-CM

## 2018-11-07 DIAGNOSIS — F11.20 UNCOMPLICATED OPIOID DEPENDENCE (HCC): ICD-10-CM

## 2018-11-07 DIAGNOSIS — G89.29 CHRONIC LEFT SHOULDER PAIN: ICD-10-CM

## 2018-11-07 DIAGNOSIS — M54.40 CHRONIC BILATERAL LOW BACK PAIN WITH SCIATICA, SCIATICA LATERALITY UNSPECIFIED: ICD-10-CM

## 2018-11-07 DIAGNOSIS — M54.12 CERVICAL RADICULOPATHY: ICD-10-CM

## 2018-11-07 DIAGNOSIS — G89.29 CHRONIC BILATERAL LOW BACK PAIN WITH SCIATICA, SCIATICA LATERALITY UNSPECIFIED: ICD-10-CM

## 2018-11-07 DIAGNOSIS — M96.3 KYPHOSIS, POSTLAMINECTOMY: ICD-10-CM

## 2018-11-07 DIAGNOSIS — M47.14 OTHER SPONDYLOSIS WITH MYELOPATHY, THORACIC REGION: ICD-10-CM

## 2018-11-07 DIAGNOSIS — G62.9 NEUROPATHY: ICD-10-CM

## 2018-11-07 DIAGNOSIS — M48.04 THORACIC SPINAL STENOSIS: ICD-10-CM

## 2018-11-07 DIAGNOSIS — G89.4 PAIN SYNDROME, CHRONIC: Primary | ICD-10-CM

## 2018-11-07 DIAGNOSIS — G89.12 POST-THORACOTOMY PAIN: ICD-10-CM

## 2018-11-07 DIAGNOSIS — M96.1 POST LAMINECTOMY SYNDROME: ICD-10-CM

## 2018-11-07 DIAGNOSIS — M54.16 CHRONIC LUMBAR RADICULOPATHY: ICD-10-CM

## 2018-11-07 DIAGNOSIS — M25.512 CHRONIC LEFT SHOULDER PAIN: ICD-10-CM

## 2018-11-07 PROCEDURE — 99214 OFFICE O/P EST MOD 30 MIN: CPT | Performed by: NURSE PRACTITIONER

## 2018-11-07 RX ORDER — HYDROCODONE BITARTRATE AND ACETAMINOPHEN 5; 325 MG/1; MG/1
1 TABLET ORAL 2 TIMES DAILY PRN
Qty: 50 TABLET | Refills: 0 | Status: SHIPPED | OUTPATIENT
Start: 2018-12-21 | End: 2018-11-07 | Stop reason: SDUPTHER

## 2018-11-07 RX ORDER — VARENICLINE TARTRATE
KIT DAILY
Refills: 0 | COMMUNITY
Start: 2018-08-19

## 2018-11-07 RX ORDER — HYDROCODONE BITARTRATE AND ACETAMINOPHEN 5; 325 MG/1; MG/1
1 TABLET ORAL 2 TIMES DAILY PRN
Qty: 50 TABLET | Refills: 0 | Status: SHIPPED | OUTPATIENT
Start: 2019-01-19 | End: 2018-11-29 | Stop reason: SDUPTHER

## 2018-11-07 RX ORDER — BACLOFEN 10 MG/1
TABLET ORAL
Qty: 60 TABLET | Refills: 2 | Status: SHIPPED | OUTPATIENT
Start: 2018-11-07 | End: 2019-01-30 | Stop reason: SDUPTHER

## 2018-11-07 RX ORDER — NAPROXEN AND ESOMEPRAZOLE MAGNESIUM 500; 20 MG/1; MG/1
1 TABLET, DELAYED RELEASE ORAL 2 TIMES DAILY PRN
Qty: 180 TABLET | Refills: 0 | Status: SHIPPED | OUTPATIENT
Start: 2018-11-07 | End: 2019-11-04

## 2018-11-07 NOTE — PROGRESS NOTES
Assessment:  1  Pain syndrome, chronic    2  Cervical radiculopathy    3  Chronic lumbar radiculopathy    4  Neuropathy    5  Other spondylosis with myelopathy, thoracic region    6  Kyphosis, postlaminectomy    7  Chronic bilateral low back pain with sciatica, sciatica laterality unspecified    8  Uncomplicated opioid dependence ([facility] [address] )    9  Post laminectomy syndrome    10  Post-thoracotomy pain    11  Thoracic spinal stenosis    12  Myofascial pain    13  Chronic left shoulder pain        Plan:  1  I will decrease Nucynta ER to 100mg Q12 hours #60 tablets for 30 days  The patient was given a 3 month supply of prescriptions with a Do Not Fill date(s) of December 5, 2018, January 3, 2019, and February 1, 2019   2   The patient can continue on Norco 5/325 mg 1 tablet b i d  p r n  breakthrough pain #50 tablets for 30 days  Please note the patient has an RX on file at the pharmacy from last office visit  The patient was given a 2 month supply of prescriptions with a Do Not Fill date(s) of December 21, 2018, and January 19, 2019  [place] Prescription Drug Monitoring Program report was reviewed and was appropriate     The patient was not picked for a pill count today, but did bring their medications as required  There are risks associated with opioid medications, including dependence, addiction and tolerance  The patient understands and agrees to use these medications only as prescribed  Potential side effects of the medications include, but are not limited to, constipation, drowsiness, addiction, impaired judgment and risk of fatal overdose if not taken as prescribed  The patient was warned against driving while taking sedation medications  Sharing medications is a felony  At this point in time, the patient is showing no signs of addiction, abuse, diversion or suicidal ideation  While the patient was in the office today an opioid contract was thoroughly reviewed and signed by the patient   The patient was given adequate time to ask questions in regards to the contract and a signed copy was sent home for their records  3   I will order an x-ray of the left shoulder to evaluate the patient's chronic left shoulder pain  Patient may be a candidate for a left subacromial subdeltoid bursa injection under ultrasound guidance  4   I offered to initiate the patient in physical therapy for his left shoulder pain, however he declines at this time  5   I will increase the patient's baclofen 10 mg to b i d  p r n  myofascial pain  I advised the patient that if they experience any side effects or issues with the changes in their medication regiment, they should give our office a call to discuss  I also advised the patient not to drive or operate machinery until they see how the changes in the medication regimen affects them  The patient was agreeable and verbalized an understanding  6   The patient will continue to follow-up with [employer] for reprogramming of his spinal cord stimulator as needed  At this time he states that his stimulator is giving him excellent relief  7   The patient may continue on the mobile and gabapentin as prescribed  8   The patient will continue with his home exercise program  9  The patient will follow-up in 3 months for medication prescription refill and reevaluation  The patient was advised to contact the office should their symptoms worsen in the interim  The patient was agreeable and verbalized an understanding  History of Present Illness: The patient is a 46 y o  male status post 2 spinal cord stimulator implantation, 1 peripherally and one for trigeminal neuralgia, last seen on 8/15/18 who presents for a follow up office visit in regards to chronic back pain secondary to post thoracic and lumbar laminectomy syndrome, myofascial pain syndrome, trigeminal neuralgia, and chronic pain syndrome        The patient currently reports that overall, he feels his pain is well managed with his current medication regimen and spinal cord stimulator  He states that he is actually had to use his opioid medication much less over the past couple of months  He states he usually gets by with 1 tablet a day of the Nucynta ER 150mg, and 1 tablet of Norco 5/325mg for breakthrough pain  He does feel that he experiences more muscle spasms in his back at times, especially after work  The patient also complains of left anterior shoulder pain  He states the pain is chronic, and he received steroid injections from Orthopedics into his left shoulder in the past, however this was years ago  The pain has returned significantly over the last couple of months  He does notice decreased range of motion secondary to pain  He does not have any recent imaging of his left shoulder  The patient also feels that his trigeminal neuralgia starting to flare up  He states he will be scheduling an appointment with Dr Jose Vickers to discuss this  Patient currently rates his pain as 6/10 on the numeric pain rating scale  He states his pain is constant in nature most bothersome in the morning and at night  He characterizes the pain as burning, dull aching, sharp, pressure-like, shooting, numbness and pins and needles  Current pain medications includes:   Nucynta  mg q 12 hours, Norco 5/325 mg 1 tablet b i d  p r n  Pain #50 tablets for 30 days, gabapentin 100 mg b i d , Vimovo 500-10mg BID prn, and baclofen 10 mg daily p r n  myofascial pain  The patient reports that this regimen is providing 60-70% pain relief  The patient is reporting no side effects from this pain medication regimen  Pain Contract Signed: 11/7/18  Last Urine Drug Screen: 8/15/18    [address] 11/7/18 am  NUCYNTA 11/7/18 am    I have personally reviewed and/or updated the patient's past medical history, past surgical history, family history, social history, current medications, allergies, and vital signs today         Review of Systems:    Review of Systems   Respiratory: Negative for shortness of breath  Cardiovascular: Negative for chest pain  Gastrointestinal: Negative for constipation, diarrhea, nausea and vomiting  Musculoskeletal: Positive for gait problem (Difficulty walking, Decreased range of motion)  Negative for arthralgias, joint swelling and myalgias  Joint stiffness   Skin: Negative for rash  Neurological: Positive for weakness  Negative for dizziness and seizures  All other systems reviewed and are negative  No past medical history on file  No past surgical history on file  No family history on file  Social History     Occupational History    Not on file       Social History Main Topics    Smoking status: Not on file    Smokeless tobacco: Not on file    Alcohol use Not on file    Drug use: Unknown    Sexual activity: Not on file         Current Outpatient Prescriptions:     atorvastatin (LIPITOR) 80 mg tablet, Take by mouth, Disp: , Rfl:     baclofen 10 mg tablet, Take 1 PO BID PRN myofascial pain, Disp: 60 tablet, Rfl: 2    CHANTIX STARTING MONTH ELÍAS 0 5 MG X 11 & 1 MG X 42 tablet, Take by mouth daily As directed, Disp: , Rfl: 0    gabapentin (NEURONTIN) 600 MG tablet, Take 1 tablet by mouth 2 (two) times a day, Disp: , Rfl:     gabapentin (NEURONTIN) 800 mg tablet, Take 1 tablet by mouth 2 (two) times a day, Disp: , Rfl:     HYDROcodone-acetaminophen (NORCO) 5-325 mg per tablet, Take 1 tablet by mouth 2 (two) times a day as needed for pain Max Daily Amount: 2 tablets, Disp: 50 tablet, Rfl: 0    lidocaine (LIDODERM) 5 %, Apply 1 patch topically, Disp: , Rfl:     lisinopril-hydrochlorothiazide (PRINZIDE,ZESTORETIC) 20-12 5 MG per tablet, Take 1 tablet by mouth daily, Disp: , Rfl: 3    metFORMIN (GLUCOPHAGE) 1000 MG tablet, Take 1 tablet by mouth 2 (two) times a day, Disp: , Rfl:     OXcarbazepine (TRILEPTAL) 600 mg tablet, 2 TABLET(S) BY MOUTH TWO TIMES DAILY, Disp: , Rfl: 2    tapentadol hcl er (NUCYNTA ER) 12 hr tablet 150 mg, Take 1 tablet (150 mg total) by mouth every 12 (twelve) hours Max Daily Amount: 300 mg, Disp: 60 tablet, Rfl: 0    VIMOVO 500-20 MG TBEC, Take 1 tablet by mouth 2 (two) times a day as needed (for pain), Disp: 180 tablet, Rfl: 0    Allergies   Allergen Reactions    Sulfa Antibiotics Edema     Annotation - [id number]: face and hands redness and swelling       Physical Exam:    /89   Pulse 85   Ht 5' 9" (1 753 m)   Wt 88 5 kg (195 lb)   BMI 28 80 kg/m²     Constitutional:normal, well developed, well nourished, alert, in no distress and non-toxic and no overt pain behavior  Eyes:anicteric  HEENT:grossly intact  Neck:supple, symmetric, trachea midline and no masses   Pulmonary:even and unlabored  Cardiovascular:No edema or pitting edema present  Skin:Normal without rashes or lesions and well hydrated  Psychiatric:Mood and affect appropriate  Neurologic:Cranial Nerves II-XII grossly intact  Musculoskeletal:Slightly antalgic, but steady without the use of assistive devices  bilateral lumbar paraspinal musculature tender to palpation  Bilateral lower extremity strength 5/5 in all muscle groups  Negative seated straight leg raise bilaterally  Anterior aspect of the left shoulder tender to palpation  Decreased range of motion in the left shoulder secondary to pain        Imaging  X-ray shoulder left 2+ views    (Results Pending)         Orders Placed This Encounter   Procedures    X-ray shoulder left 2+ views

## 2018-11-29 ENCOUNTER — TELEPHONE (OUTPATIENT)
Dept: PAIN MEDICINE | Facility: MEDICAL CENTER | Age: 53
End: 2018-11-29

## 2018-11-29 DIAGNOSIS — G89.4 PAIN SYNDROME, CHRONIC: ICD-10-CM

## 2018-11-29 RX ORDER — HYDROCODONE BITARTRATE AND ACETAMINOPHEN 5; 325 MG/1; MG/1
1 TABLET ORAL 2 TIMES DAILY PRN
Qty: 50 TABLET | Refills: 0 | Status: SHIPPED | OUTPATIENT
Start: 2018-11-29 | End: 2019-01-30 | Stop reason: SDUPTHER

## 2018-11-29 NOTE — TELEPHONE ENCOUNTER
Attempted to reach pt to inform him that rx has been sent but it said the prescriber you have been trying to reach is out of service

## 2018-11-29 NOTE — TELEPHONE ENCOUNTER
S/w pt who states pharmacy does not have a November prescription of hydrocodone for him  [facility] and [facility] script for patient, but no Script available to fill at this time  [facility]   [facility]?

## 2018-11-29 NOTE — TELEPHONE ENCOUNTER
Sure  I gave him 3 RXs during his August visit and he only filled 2 so when I recently saw him in November, I thought there was still one on file for him to fill  If there is not, I will provide him with another RX that he can fill today

## 2018-11-29 NOTE — TELEPHONE ENCOUNTER
Caller:  patient  Call back number: [phone]    Patient requests refill for: HYDROcodone-acetaminophen ([address]) 5-325 mg per tablet, patient advised medication refills may take up to 48 hours - last dose was yesterday    Pharmacy: [facility]/pharmacy #[id number] #[id number], [address] [place]

## 2019-01-22 ENCOUNTER — APPOINTMENT (OUTPATIENT)
Dept: RADIOLOGY | Age: 54
End: 2019-01-22
Payer: COMMERCIAL

## 2019-01-22 DIAGNOSIS — G89.29 CHRONIC LEFT SHOULDER PAIN: ICD-10-CM

## 2019-01-22 DIAGNOSIS — M25.512 CHRONIC LEFT SHOULDER PAIN: ICD-10-CM

## 2019-01-22 PROCEDURE — 73030 X-RAY EXAM OF SHOULDER: CPT

## 2019-01-30 ENCOUNTER — OFFICE VISIT (OUTPATIENT)
Dept: PAIN MEDICINE | Facility: CLINIC | Age: 54
End: 2019-01-30
Payer: COMMERCIAL

## 2019-01-30 VITALS
HEART RATE: 91 BPM | BODY MASS INDEX: 29.33 KG/M2 | TEMPERATURE: 99.1 F | WEIGHT: 198 LBS | HEIGHT: 69 IN | DIASTOLIC BLOOD PRESSURE: 95 MMHG | SYSTOLIC BLOOD PRESSURE: 158 MMHG

## 2019-01-30 DIAGNOSIS — M79.18 MYOFASCIAL PAIN: ICD-10-CM

## 2019-01-30 DIAGNOSIS — M96.1 POST LAMINECTOMY SYNDROME: ICD-10-CM

## 2019-01-30 DIAGNOSIS — F11.20 UNCOMPLICATED OPIOID DEPENDENCE (HCC): ICD-10-CM

## 2019-01-30 DIAGNOSIS — G89.4 PAIN SYNDROME, CHRONIC: Primary | ICD-10-CM

## 2019-01-30 DIAGNOSIS — M19.012 DEGENERATIVE JOINT DISEASE OF LEFT ACROMIOCLAVICULAR JOINT: ICD-10-CM

## 2019-01-30 PROCEDURE — 99214 OFFICE O/P EST MOD 30 MIN: CPT | Performed by: NURSE PRACTITIONER

## 2019-01-30 PROCEDURE — 80305 DRUG TEST PRSMV DIR OPT OBS: CPT | Performed by: NURSE PRACTITIONER

## 2019-01-30 RX ORDER — BACLOFEN 10 MG/1
TABLET ORAL
Qty: 60 TABLET | Refills: 2 | Status: SHIPPED | OUTPATIENT
Start: 2019-01-30 | End: 2019-04-24 | Stop reason: SDUPTHER

## 2019-01-30 RX ORDER — HYDROCODONE BITARTRATE AND ACETAMINOPHEN 5; 325 MG/1; MG/1
1 TABLET ORAL 2 TIMES DAILY PRN
Qty: 50 TABLET | Refills: 0 | Status: SHIPPED | OUTPATIENT
Start: 2019-03-28 | End: 2019-04-24 | Stop reason: SDUPTHER

## 2019-01-30 NOTE — PROGRESS NOTES
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        Plan:  1  I will schedule the patient for a left acromioclavicular joint injection under ultrasound guidance with Dr Sylvia Jacobs to address inflammatory component of the patient's left shoulder pain  The use of direct ultrasound visualization of the needle (rather than a non-guided injection) is required for this procedure to ensure accurate injection placement so there can be diagnostic specificity when evaluating effectiveness of the injection, and for safety purposes to minimize risk of bleeding or injury to surrounding structures  Complete risks and benefits including bleeding, infection, tissue reaction, nerve injury and allergic reaction were discussed  The approach was demonstrated using models and literature was provided  Verbal consent was obtained  The patient may also benefit from a left intra-articular shoulder injection  2  The patient may continue Nucynta  mg q 12 hours #60 tablets for 30 days for ongoing chronic pain complaints  Please note that I did call the pharmacy confirm that there is still 2 month's worth of prescriptions on file for this medication, therefore will provide him with 1 more month's worth of medication with a do not fill date of March 29, 2019    3   The patient may continue Norco 5/325 mg 1 tablet b i d  P r n  breakthrough pain #50 tablets for 30 days  Please note the patient also has 2 month's worth of prescriptions on file for this medication as well, therefore I will provide him with 1 more month's worth of medication with a do not fill date of March 28, 2019  [place] Prescription Drug Monitoring Program report was reviewed and was appropriate     A urine drug screen was collected at today's office visit as part of our medication management protocol   The point of care testing results were appropriate for what was being prescribed  The specimen will be sent for confirmatory testing  The drug screen is medically necessary because the patient is either dependent on opioid medication or is being considered for opioid medication therapy and the results could impact ongoing or future treatment  The drug screen is to evaluate for the presences or absence of prescribed, non-prescribed, and/or illicit drugs/substances  There are risks associated with opioid medications, including dependence, addiction and tolerance  The patient understands and agrees to use these medications only as prescribed  Potential side effects of the medications include, but are not limited to, constipation, drowsiness, addiction, impaired judgment and risk of fatal overdose if not taken as prescribed  The patient was warned against driving while taking sedation medications  Sharing medications is a felony  At this point in time, the patient is showing no signs of addiction, abuse, diversion or suicidal ideation  The patient was selected for a random pill count at todays office visit  The medication was available for the count and the amount present was found to be appropriate according to the date(s) filled and the medication prescription instructions noted on the prescription container  The medication was returned to the patient and the documentation form can be found in the patient's chart  4  The patient may continue baclofen 10 mg b i d  P r n  myofascial pain  This medication was refilled at today's office visit  5  Patient will continue to follow-up with [employer] for reprogramming of his spinal cord stimulator as needed  At this time, he feels he is obtaining optimal relief  6  Patient may continue gabapentin as prescribed by Neurology  7  Patient will continue with his home exercise program  8   The patient will follow-up in 3 months for medication prescription refill and reevaluation  The patient was advised to contact the office should their symptoms worsen in the interim  The patient was agreeable and verbalized an understanding  History of Present Illness: The patient is a 48 y o  male status post to spinal cord stimulator implantations, 1 peripherally and 1 for trigeminal neuralgia last seen on 11/7/18 who presents for a follow up office visit in regards to chronic thoracic and lumbosacral back pain with radiculopathy into the bilateral lower extremities secondary to post Thoracic lumbar post-laminectomy syndrome, myofascial pain syndrome, trigeminal neuralgia, and chronic pain syndrome  Overall, the patient feels his low back and leg complaints are well managed on his current medication regimen and with the use of his [employer] spinal cord stimulator  His biggest complaint has been his left anterior shoulder pain  He denies any radicular symptoms into the upper extremity  X-ray of the left shoulder revealed mild osteoarthritis of the shoulder and acromioclavicular joint  He localizes most of his pain over the [facility] joint  The patient currently rates his pain a 6/10 on the numeric pain rating scale  States his pain is constant in nature most bothersome in the morning and at night  He characterizes the pain as burning, dull aching, sharp, throbbing, cramping, pressure like, shooting, numbness and pins and needles  Current pain medications includes:  Nucynta  mg q 12 hours, Norco 5/325 mg 1 tablet b i d  P r n  breakthrough pain #50 tablets for 30 days, baclofen 10 mg b i d  P r n  myofascial pain, gabapentin as prescribed by Neurology, and the most both b i d  P r n     The patient reports that this regimen is providing 60% pain relief  The patient is reporting no side effects from this pain medication regimen      Pain Contract Signed: 11/7/18  Last Urine Drug Screen: 01/30/19  Hydrocodone last taken 01/30/19  Nucynta last taken 01/30/19    I have personally reviewed and/or updated the patient's past medical history, past surgical history, family history, social history, current medications, allergies, and vital signs today  Review of Systems:    Review of Systems   Respiratory: Negative for shortness of breath  Cardiovascular: Negative for chest pain  Gastrointestinal: Negative for constipation, diarrhea, nausea and vomiting  Musculoskeletal: Positive for gait problem and joint swelling  Negative for arthralgias and myalgias  Decreased ROM, pain in extremity   Skin: Negative for rash  Neurological: Positive for weakness  Negative for dizziness and seizures  All other systems reviewed and are negative  No past medical history on file  No past surgical history on file  No family history on file  Social History     Occupational History    Not on file       Social History Main Topics    Smoking status: Current Every Day Smoker     Types: Cigarettes    Smokeless tobacco: Never Used    Alcohol use No    Drug use: No    Sexual activity: Not on file         Current Outpatient Prescriptions:     atorvastatin (LIPITOR) 80 mg tablet, Take by mouth, Disp: , Rfl:     baclofen 10 mg tablet, Take 1 PO BID PRN myofascial pain, Disp: 60 tablet, Rfl: 2    CHANTIX STARTING MONTH ELÍAS 0 5 MG X 11 & 1 MG X 42 tablet, Take by mouth daily As directed, Disp: , Rfl: 0    gabapentin (NEURONTIN) 600 MG tablet, Take 1 tablet by mouth 2 (two) times a day, Disp: , Rfl:     gabapentin (NEURONTIN) 800 mg tablet, Take 1 tablet by mouth 2 (two) times a day, Disp: , Rfl:     [START ON 3/28/2019] HYDROcodone-acetaminophen (NORCO) 5-325 mg per tablet, Take 1 tablet by mouth 2 (two) times a day as needed for pain Max Daily Amount: 2 tablets, Disp: 50 tablet, Rfl: 0    LamoTRIgine 25 (21)-50 (7) MG KIT, USE 1 KIT AS DIRECTED, Disp: , Rfl: 0    lidocaine (LIDODERM) 5 %, Apply 1 patch topically, Disp: , Rfl:     lisinopril-hydrochlorothiazide (PRINZIDE,ZESTORETIC) 20-12 5 MG per tablet, Take 1 tablet by mouth daily, Disp: , Rfl: 3    metFORMIN (GLUCOPHAGE) 1000 MG tablet, Take 1 tablet by mouth 2 (two) times a day, Disp: , Rfl:     OXcarbazepine (TRILEPTAL) 600 mg tablet, 2 TABLET(S) BY MOUTH TWO TIMES DAILY, Disp: , Rfl: 2    [START ON 3/29/2019] Tapentadol HCl ER (NUCYNTA ER) 100 MG TB12, Take 1 tablet (100 mg total) by mouth every 12 (twelve) hours Max Daily Amount: 200 mg, Disp: 60 tablet, Rfl: 0    VIMOVO 500-20 MG TBEC, Take 1 tablet by mouth 2 (two) times a day as needed (for pain), Disp: 180 tablet, Rfl: 0    Allergies   Allergen Reactions    Sulfa Antibiotics Edema     Annotation - [id number]: face and hands redness and swelling       Physical Exam:    /95   Pulse 91   Temp 99 1 °F (37 3 °C) (Oral)   Ht 5' 9" (1 753 m)   Wt 89 8 kg (198 lb)   BMI 29 24 kg/m²     Constitutional:normal, well developed, well nourished, alert, in no distress and non-toxic and no overt pain behavior  Eyes:anicteric  HEENT:grossly intact  Neck:supple, symmetric, trachea midline and no masses   Pulmonary:even and unlabored  Cardiovascular:No edema or pitting edema present  Skin:Normal without rashes or lesions and well hydrated  Psychiatric:Mood and affect appropriate  Neurologic:Cranial Nerves II-XII grossly intact  Musculoskeletal:normal gait  Bilateral lumbar paraspinal musculature tender to palpation  Bilateral lower extremity strength 5/5 in all muscle groups  Negative seated straight leg raise bilaterally  Anterior aspect of the left shoulder tender to palpation  Decreased range of motion in the left shoulder secondary to pain  Imaging  No orders to display     LEFT SHOULDER     INDICATION:   M25 512: Pain in left shoulder  G89 29: Other chronic pain    Chronic, intermittent pain      COMPARISON:  None     VIEWS:  XR SHOULDER 2+ VW LEFT   Images: 3     FINDINGS:  There is no acute fracture or dislocation      Mild narrowing at the glenohumeral articulation  Mild osteophytosis of the inferior glenoid  Mild hypertrophic changes at the acromioclavicular joint      Incidental note is made of surgery within the thoracic spine with pedicle screws and lateral fixation bars  Spinal stimulator lead is present      No lytic or blastic lesions are seen      Soft tissues are unremarkable      IMPRESSION:  Degenerative changes of the shoulder and acromioclavicular joint      No acute osseous abnormality      There is a spinal stimulating device which may preclude further evaluation with MRI  If MRI is considered, correlation for MRI compatibility of the spinal stimulator recommended prior to examination           No orders of the defined types were placed in this encounter

## 2019-02-14 ENCOUNTER — CLINICAL SUPPORT (OUTPATIENT)
Dept: PAIN MEDICINE | Facility: CLINIC | Age: 54
End: 2019-02-14
Payer: COMMERCIAL

## 2019-02-14 DIAGNOSIS — M25.512 LEFT SHOULDER PAIN, UNSPECIFIED CHRONICITY: ICD-10-CM

## 2019-02-14 DIAGNOSIS — M19.012 DJD OF LEFT AC (ACROMIOCLAVICULAR) JOINT: Primary | ICD-10-CM

## 2019-02-14 PROCEDURE — 20606 DRAIN/INJ JOINT/BURSA W/US: CPT | Performed by: ANESTHESIOLOGY

## 2019-02-14 NOTE — PROGRESS NOTES
Medium joint arthrocentesis: L acromioclavicular  Date/Time: 2/14/2019 10:22 AM  Consent given by: patient  Site marked: site marked  Timeout: Immediately prior to procedure a time out was called to verify the correct patient, procedure, equipment, support staff and site/side marked as required   Supporting Documentation  Indications: pain   Procedure Details  Location: shoulder - L acromioclavicular  Preparation: Patient was prepped and draped in the usual sterile fashion  Needle size: 25 G  Ultrasound guidance: yes  Medication group details: 1 mL of 0 2 5% bupivacaine and 40 mg of Depo-Medrol  Ultrasound-guided left acromioclavicular joint injection    Indication:  Left shoulder pain  Preoperative diagnosis:  Degenerative joint disease of the left AC joint  Postoperative diagnosis:  Degenerative joint disease of the left AC joint  Procedure: Ultrasound-guided left subacromial AC joint injection    After discussing the risks, benefits, and alternatives to the procedure, the patient expressed understanding and wished to proceed  The patient was brought to the procedure suite and placed in the side lying position  A procedural pause was conducted to verify: correct patient identity, procedure to be performed and as applicable, correct side and site, correct patient position, and availability of implants, special equipment, or special requirements  A simple surgical tray was used  Prior to the procedure, the left shoulder was examined with a 12MHz linear transducer to visualize the acromioclavicular joint and determine the optimal needle path  Following this, the left shoulder was prepared with a Chloraprep scrub, then re-examined using the same transducer, a sterile utrasound transducer cover, and sterile ultrasound transducer gel  Thereafter, using ultrasound guidance, a 2 5 inch 25 guage needle was advanced into the left acromioclavicular joint   After visualization of the tip of the needle in the target area and negative aspiration for blood, a mixture of 40mg of Depo-Medrol in 1cc of 0 25% buivicaine was injected into the left acromioclavicular joint  The patient tolerated the procedure well and there were no apparent complications  After an appropriate amount of observation, the patient was dismissed from the recovery area in good condition under their own power  COMMENTS:  The patient received a total steroid dose of 40mg of Depo-Medrol

## 2019-04-24 ENCOUNTER — OFFICE VISIT (OUTPATIENT)
Dept: PAIN MEDICINE | Facility: CLINIC | Age: 54
End: 2019-04-24
Payer: COMMERCIAL

## 2019-04-24 VITALS
DIASTOLIC BLOOD PRESSURE: 80 MMHG | HEIGHT: 69 IN | WEIGHT: 194 LBS | HEART RATE: 97 BPM | TEMPERATURE: 98.8 F | SYSTOLIC BLOOD PRESSURE: 141 MMHG | BODY MASS INDEX: 28.73 KG/M2

## 2019-04-24 DIAGNOSIS — M96.1 POST LAMINECTOMY SYNDROME: Primary | ICD-10-CM

## 2019-04-24 DIAGNOSIS — Z79.891 ENCOUNTER FOR LONG-TERM OPIATE ANALGESIC USE: ICD-10-CM

## 2019-04-24 DIAGNOSIS — G89.29 CHRONIC BILATERAL LOW BACK PAIN WITH SCIATICA, SCIATICA LATERALITY UNSPECIFIED: ICD-10-CM

## 2019-04-24 DIAGNOSIS — M79.18 MYOFASCIAL PAIN: ICD-10-CM

## 2019-04-24 DIAGNOSIS — G89.4 PAIN SYNDROME, CHRONIC: ICD-10-CM

## 2019-04-24 DIAGNOSIS — F11.20 UNCOMPLICATED OPIOID DEPENDENCE (HCC): ICD-10-CM

## 2019-04-24 DIAGNOSIS — M54.40 CHRONIC BILATERAL LOW BACK PAIN WITH SCIATICA, SCIATICA LATERALITY UNSPECIFIED: ICD-10-CM

## 2019-04-24 DIAGNOSIS — M54.16 CHRONIC LUMBAR RADICULOPATHY: ICD-10-CM

## 2019-04-24 DIAGNOSIS — G62.9 NEUROPATHY: ICD-10-CM

## 2019-04-24 PROCEDURE — 99214 OFFICE O/P EST MOD 30 MIN: CPT | Performed by: NURSE PRACTITIONER

## 2019-04-24 RX ORDER — HYDROCODONE BITARTRATE AND ACETAMINOPHEN 5; 325 MG/1; MG/1
1 TABLET ORAL 2 TIMES DAILY PRN
Qty: 50 TABLET | Refills: 0 | Status: SHIPPED | OUTPATIENT
Start: 2019-04-26 | End: 2019-04-24 | Stop reason: SDUPTHER

## 2019-04-24 RX ORDER — HYDROCODONE BITARTRATE AND ACETAMINOPHEN 5; 325 MG/1; MG/1
1 TABLET ORAL 2 TIMES DAILY PRN
Qty: 50 TABLET | Refills: 0 | Status: SHIPPED | OUTPATIENT
Start: 2019-06-23 | End: 2019-07-17 | Stop reason: SDUPTHER

## 2019-04-24 RX ORDER — BACLOFEN 10 MG/1
TABLET ORAL
Qty: 60 TABLET | Refills: 2 | Status: SHIPPED | OUTPATIENT
Start: 2019-04-24 | End: 2019-07-17 | Stop reason: SDUPTHER

## 2019-04-24 RX ORDER — HYDROCODONE BITARTRATE AND ACETAMINOPHEN 5; 325 MG/1; MG/1
1 TABLET ORAL 2 TIMES DAILY PRN
Qty: 50 TABLET | Refills: 0 | Status: SHIPPED | OUTPATIENT
Start: 2019-05-25 | End: 2019-04-24 | Stop reason: SDUPTHER

## 2019-07-17 ENCOUNTER — OFFICE VISIT (OUTPATIENT)
Dept: PAIN MEDICINE | Facility: CLINIC | Age: 54
End: 2019-07-17
Payer: COMMERCIAL

## 2019-07-17 VITALS
HEART RATE: 95 BPM | WEIGHT: 193 LBS | BODY MASS INDEX: 28.58 KG/M2 | HEIGHT: 69 IN | SYSTOLIC BLOOD PRESSURE: 157 MMHG | TEMPERATURE: 98.8 F | DIASTOLIC BLOOD PRESSURE: 85 MMHG

## 2019-07-17 DIAGNOSIS — F11.20 UNCOMPLICATED OPIOID DEPENDENCE (HCC): ICD-10-CM

## 2019-07-17 DIAGNOSIS — G50.0 TRIGEMINAL NEURALGIA: ICD-10-CM

## 2019-07-17 DIAGNOSIS — G62.9 NEUROPATHY: ICD-10-CM

## 2019-07-17 DIAGNOSIS — M19.012 DJD OF LEFT AC (ACROMIOCLAVICULAR) JOINT: ICD-10-CM

## 2019-07-17 DIAGNOSIS — M79.18 MYOFASCIAL PAIN: ICD-10-CM

## 2019-07-17 DIAGNOSIS — G89.4 PAIN SYNDROME, CHRONIC: ICD-10-CM

## 2019-07-17 DIAGNOSIS — M54.16 CHRONIC LUMBAR RADICULOPATHY: ICD-10-CM

## 2019-07-17 DIAGNOSIS — Z79.891 ENCOUNTER FOR LONG-TERM OPIATE ANALGESIC USE: ICD-10-CM

## 2019-07-17 DIAGNOSIS — M96.1 POST LAMINECTOMY SYNDROME: Primary | ICD-10-CM

## 2019-07-17 PROCEDURE — 80305 DRUG TEST PRSMV DIR OPT OBS: CPT | Performed by: NURSE PRACTITIONER

## 2019-07-17 PROCEDURE — 99214 OFFICE O/P EST MOD 30 MIN: CPT | Performed by: NURSE PRACTITIONER

## 2019-07-17 RX ORDER — HYDROCODONE BITARTRATE AND ACETAMINOPHEN 5; 325 MG/1; MG/1
1 TABLET ORAL 2 TIMES DAILY PRN
Qty: 50 TABLET | Refills: 0 | Status: SHIPPED | OUTPATIENT
Start: 2019-09-09 | End: 2019-07-17 | Stop reason: SDUPTHER

## 2019-07-17 RX ORDER — HYDROCODONE BITARTRATE AND ACETAMINOPHEN 5; 325 MG/1; MG/1
1 TABLET ORAL 2 TIMES DAILY PRN
Qty: 50 TABLET | Refills: 0 | Status: SHIPPED | OUTPATIENT
Start: 2019-10-08 | End: 2019-08-19 | Stop reason: SDUPTHER

## 2019-07-17 RX ORDER — BACLOFEN 10 MG/1
TABLET ORAL
Qty: 60 TABLET | Refills: 2 | Status: SHIPPED | OUTPATIENT
Start: 2019-07-17 | End: 2020-07-21 | Stop reason: SDUPTHER

## 2019-07-17 NOTE — PATIENT INSTRUCTIONS
Opioid Pain Management   AMBULATORY CARE:   An opioid  is a type of medicine used to treat pain  Examples of opioids are oxycodone, morphine, fentanyl, or codeine  Take opioid medicines as directed, for the condition it is prescribed:  Common problems that may occur when you do not take opioid medicines as directed include the following:  · Health problems  may occur  You may have trouble breathing  You may also develop liver or kidney damage, or stomach bleeding  Any of these health problems can become life-threatening  · Opioid dependence  means your body needs the opioid medicine to keep it from going through withdrawal      · Opioid tolerance  means the opioid does not control pain as well as it used to  You need higher doses of the opioid to get pain relief  · Opioid addiction  means you are not able to control the use of the opioid medicine  You use it when you do not have pain  You crave the opioid medicine  Call 911 or have someone call 911 for any of the following:   · You are breathing slower than normal, or you have trouble breathing  · You cannot be awakened  · You have a seizure  Seek care immediately if:   · Your heart is beating slower than usual     · Your heart feels like it is jumping or fluttering  · You are so sleepy that you cannot stay awake  · You have severe muscle pain or weakness  · You see or hear things that are not real   Contact your healthcare provider if:   · You are too dizzy to stand up  · Your pain gets worse or you have new pain  · You cannot do your usual activities because of side effects from the opioid  · You are constipated or have abdominal pain  · You have questions or concerns about your condition or care  Opioid safety measures:   · Take your medicine as directed  Ask if you need more information on how to take your medicine correctly  Follow up with your healthcare provider regularly  You may need to have your dose adjusted   Do not use opioid medicine if you are pregnant or breastfeeding  · Give your healthcare provider a list of all your medicines  Include any over-the-counter medicines, vitamins, and herbs  It can be dangerous to take opioids with certain other medicines, such as antihistamines  · Keep opioid medicine in a safe place  Store your opioid medicine in a locked cabinet to keep it away from children and others  · Do not drink alcohol while you use opioids  Alcohol use with an opioid medicine can make you sleepy and slow your breathing rate  You may stop breathing completely  · Do not drive or operate heavy machinery after you take opioid medicine  Opioid medicine can make you drowsy and make it hard to concentrate  You may injure yourself or others if you drive or operate heavy machinery while taking your medicine  · Drink fluids and eat high-fiber foods  Fluids and fiber will help prevent constipation  Ask your healthcare provider what fluids are right for you and how much you should drink  Also ask for a list of foods that contain fiber  Follow up with your healthcare provider as directed: You may need to have your dose adjusted  You may be referred to a pain specialist  Write down your questions so you remember to ask them during your visits  © 2017 [employer] Information is for End User's use only and may not be sold, redistributed or otherwise used for commercial purposes  All illustrations and images included in CareNotes® are the copyrighted property of [employer]  or Steven Rivera  The above information is an  only  It is not intended as medical advice for individual conditions or treatments  Talk to your doctor, nurse or pharmacist before following any medical regimen to see if it is safe and effective for you

## 2019-07-17 NOTE — PROGRESS NOTES
Assessment:  1  Post laminectomy syndrome    2  Myofascial pain    3  Pain syndrome, chronic    4  Chronic lumbar radiculopathy    5  Neuropathy    6  Trigeminal neuralgia    7  DJD of left AC (acromioclavicular) joint    8  Encounter for long-term opiate analgesic use    9  Uncomplicated opioid dependence ([address] )        Plan:  1  The patient may continue Nucynta  mg q 12 hours #60 tablets for 30 days and Norco 3/325mg 1 tab BID PRN breakthrough pain #50 tablets for 30 days  Please note, the patient states he still has a 1 month prescription of each of these medications on file, therefore I will provide him with 2 more months of prescriptions with do not fill dates of September 9, 2019 and October 8, 2019 which will bring him to his next appointment  [place] Prescription Drug Monitoring Program report was reviewed and was appropriate     A urine drug screen was collected at today's office visit as part of our medication management protocol  The point of care testing results were appropriate for what was being prescribed  The specimen will be sent for confirmatory testing  The drug screen is medically necessary because the patient is either dependent on opioid medication or is being considered for opioid medication therapy and the results could impact ongoing or future treatment  The drug screen is to evaluate for the presences or absence of prescribed, non-prescribed, and/or illicit drugs/substances  There are risks associated with opioid medications, including dependence, addiction and tolerance  The patient understands and agrees to use these medications only as prescribed  Potential side effects of the medications include, but are not limited to, constipation, drowsiness, addiction, impaired judgment and risk of fatal overdose if not taken as prescribed  The patient was warned against driving while taking sedation medications  Sharing medications is a felony   At this point in time, the patient is showing no signs of addiction, abuse, diversion or suicidal ideation  2   The patient may continue baclofen as prescribed  No changes were made to this medication at this time  3  Patient continues ongoing relief of his left shoulder pain status post left acromioclavicular joint injection with Dr Ced Keller  We can repeat this injection as needed  4  The patient will continue to follow with [facility] for reprogramming sessions of a spinal cord stimulator device that is needed  He feels the device is functioning optimally at this time  5  Patient may continue gabapentin as prescribed by Neurology  6  The patient will continue with his home exercise program  7  The patient will follow-up in 3 months for medication prescription refill and reevaluation  The patient was advised to contact the office should their symptoms worsen in the interim  The patient was agreeable and verbalized an understanding  Other than as stated above, the patient denies any interval changes in medications, medical condition, mental condition, symptoms, or allergies since the last office visit  M*Modal software was used to dictate this note  It may contain errors with dictating incorrect words or incorrect spelling  Please contact the provider directly with any questions  History of Present Illness: The patient is a 48 y o  male status post 2 spinal cord stimulator implants, 1 peripheral and 1 for trigeminal neuralgia last seen on 4/24/19 who presents for a follow up office visit in regards to chronic lumbosacral back pain and thoracic back pain secondary to lumbar post-laminectomy syndrome, myofascial pain syndrome and chronic pain syndrome  The patient denies bowel or bladder incontinence or saddle anesthesia  At today's office visit, the patient offers no new pain complaints  He feels his medication regimen is managing his pain complaints optimally at this time along with his spinal cord stimulator device    He continues with ongoing relief status post left acromioclavicular joint injection with Dr Dany Garcia  He currently rates his pain a 6/10 on the numeric pain rating scale  He states his pain is constant nature most bothersome the morning in the evening  He characterizes the pain as burning, dull aching, sharp, throbbing, pressure-like, shooting, numbness and pins and needles  Current pain medications includes:  Nucynta  mg q 12 hours, Norco 5/325 mg 1 tablet b i d  P r n  breakthrough pain, baclofen 10 mg b i d  P r n  Myofascial pain, and gabapentin 800 mg b i d  As prescribed by Neurology    The patient reports that this regimen is providing 50% pain relief  The patient is reporting no side effects from this pain medication regimen  Pain Contract Signed: 11/07/18  Last Urine Drug Screen: 01/30/19  Hydrocodone last taken: 07/17/19  Nucynta last taken: 07/17/19    I have personally reviewed and/or updated the patient's past medical history, past surgical history, family history, social history, current medications, allergies, and vital signs today  Review of Systems:    Review of Systems   Respiratory: Negative for shortness of breath  Cardiovascular: Negative for chest pain  Gastrointestinal: Negative for constipation, diarrhea, nausea and vomiting  Musculoskeletal: Positive for gait problem  Negative for arthralgias, joint swelling and myalgias  Decreased ROM, swelling, pain in extremity   Skin: Negative for rash  Neurological: Positive for weakness  Negative for dizziness and seizures  All other systems reviewed and are negative  No past medical history on file  No past surgical history on file  No family history on file      Social History     Occupational History    Not on file   Tobacco Use    Smoking status: Current Every Day Smoker     Types: Cigarettes    Smokeless tobacco: Never Used   Substance and Sexual Activity    Alcohol use: No    Drug use: No    Sexual activity: Not on file         Current Outpatient Medications:     atorvastatin (LIPITOR) 80 mg tablet, Take by mouth, Disp: , Rfl:     baclofen 10 mg tablet, Take 1 PO BID PRN myofascial pain, Disp: 60 tablet, Rfl: 2    CHANTIX STARTING MONTH ELÍAS 0 5 MG X 11 & 1 MG X 42 tablet, Take by mouth daily As directed, Disp: , Rfl: 0    gabapentin (NEURONTIN) 600 MG tablet, Take 1 tablet by mouth 2 (two) times a day, Disp: , Rfl:     gabapentin (NEURONTIN) 800 mg tablet, Take 1 tablet by mouth 2 (two) times a day, Disp: , Rfl:     [START ON 10/8/2019] HYDROcodone-acetaminophen (NORCO) 5-325 mg per tablet, Take 1 tablet by mouth 2 (two) times a day as needed for painMax Daily Amount: 2 tablets, Disp: 50 tablet, Rfl: 0    LamoTRIgine 25 (21)-50 (7) MG KIT, USE 1 KIT AS DIRECTED, Disp: , Rfl: 0    lidocaine (LIDODERM) 5 %, Apply 1 patch topically, Disp: , Rfl:     lisinopril-hydrochlorothiazide (PRINZIDE,ZESTORETIC) 20-12 5 MG per tablet, Take 1 tablet by mouth daily, Disp: , Rfl: 3    metFORMIN (GLUCOPHAGE) 1000 MG tablet, Take 1 tablet by mouth 2 (two) times a day, Disp: , Rfl:     OXcarbazepine (TRILEPTAL) 600 mg tablet, 2 TABLET(S) BY MOUTH TWO TIMES DAILY, Disp: , Rfl: 2    [START ON 10/8/2019] Tapentadol HCl ER (NUCYNTA ER) 100 MG TB12, Take 1 tablet (100 mg total) by mouth every 12 (twelve) hoursMax Daily Amount: 200 mg, Disp: 60 tablet, Rfl: 0    VIMOVO 500-20 MG TBEC, Take 1 tablet by mouth 2 (two) times a day as needed (for pain), Disp: 180 tablet, Rfl: 0    Allergies   Allergen Reactions    Sulfa Antibiotics Edema     Annotation - [id number]: face and hands redness and swelling       Physical Exam:    /85   Pulse 95   Temp 98 8 °F (37 1 °C) (Oral)   Ht 5' 9" (1 753 m)   Wt 87 5 kg (193 lb)   BMI 28 50 kg/m²     Constitutional:normal, well developed, well nourished, alert, in no distress and non-toxic and no overt pain behavior    Eyes:anicteric  HEENT:grossly intact  Neck:supple, symmetric, trachea midline and no masses   Pulmonary:even and unlabored  Cardiovascular:No edema or pitting edema present  Skin:Normal without rashes or lesions and well hydrated  Psychiatric:Mood and affect appropriate  Neurologic:Cranial Nerves II-XII grossly intact  Musculoskeletal:normal gait        Imaging  No orders to display         No orders of the defined types were placed in this encounter

## 2019-08-19 ENCOUNTER — TELEPHONE (OUTPATIENT)
Dept: PAIN MEDICINE | Facility: CLINIC | Age: 54
End: 2019-08-19

## 2019-08-19 DIAGNOSIS — M96.1 POST LAMINECTOMY SYNDROME: ICD-10-CM

## 2019-08-19 DIAGNOSIS — G89.4 PAIN SYNDROME, CHRONIC: ICD-10-CM

## 2019-08-19 RX ORDER — HYDROCODONE BITARTRATE AND ACETAMINOPHEN 5; 325 MG/1; MG/1
1 TABLET ORAL 2 TIMES DAILY PRN
Qty: 50 TABLET | Refills: 0 | Status: SHIPPED | OUTPATIENT
Start: 2019-09-17 | End: 2019-08-20 | Stop reason: SDUPTHER

## 2019-08-19 RX ORDER — HYDROCODONE BITARTRATE AND ACETAMINOPHEN 5; 325 MG/1; MG/1
1 TABLET ORAL 2 TIMES DAILY PRN
Qty: 50 TABLET | Refills: 0 | Status: SHIPPED | OUTPATIENT
Start: 2019-08-19 | End: 2019-08-19 | Stop reason: SDUPTHER

## 2019-08-19 NOTE — TELEPHONE ENCOUNTER
[facility] called and stated they received 2 new scripts for Pt however both came from [employer] which is not licensed and they cannot fill  Could we please resend the corrected scripts as soon as possible        Pharmacy can be reached at  [phone]

## 2019-08-19 NOTE — TELEPHONE ENCOUNTER
My fault, I somehow missed a whole month when writing out his RXs last office visit! I resent 2 months worth or RXs  The one RX can be filled today, the second month can be filled on 9/17/19  He has an appt scheduled with our office on 10/10/19 so he will be seen again before he runs out of medication  Please call the patient's pharmacy and cancel RXs that were dated for fill dates of 9/9 and 10/8  Thank you

## 2019-08-19 NOTE — TELEPHONE ENCOUNTER
Pt called the pharmacy for HYDROCODONE and pharmacy only has scripts for July and September August script not there  [facility] [address]  Please advise   [phone]

## 2019-08-19 NOTE — TELEPHONE ENCOUNTER
SW patient states that the last prescription he picked up was on 7/13/19 for [facility]  Patient does not have a script for August 2019   pharmacist Juanita Delvalle) states that patient has one on hold for 9/9/19 and 10/8/19  Please advise

## 2019-08-20 ENCOUNTER — TELEPHONE (OUTPATIENT)
Dept: PAIN MEDICINE | Facility: CLINIC | Age: 54
End: 2019-08-20

## 2019-08-20 DIAGNOSIS — M96.1 POST LAMINECTOMY SYNDROME: ICD-10-CM

## 2019-08-20 DIAGNOSIS — G89.4 PAIN SYNDROME, CHRONIC: ICD-10-CM

## 2019-08-20 RX ORDER — HYDROCODONE BITARTRATE AND ACETAMINOPHEN 5; 325 MG/1; MG/1
1 TABLET ORAL 2 TIMES DAILY PRN
Qty: 50 TABLET | Refills: 0 | Status: SHIPPED | OUTPATIENT
Start: 2019-08-20 | End: 2019-08-20 | Stop reason: SDUPTHER

## 2019-08-20 RX ORDER — HYDROCODONE BITARTRATE AND ACETAMINOPHEN 5; 325 MG/1; MG/1
1 TABLET ORAL 2 TIMES DAILY PRN
Qty: 50 TABLET | Refills: 0 | Status: SHIPPED | OUTPATIENT
Start: 2019-09-18 | End: 2019-08-20 | Stop reason: SDUPTHER

## 2019-08-20 RX ORDER — HYDROCODONE BITARTRATE AND ACETAMINOPHEN 5; 325 MG/1; MG/1
1 TABLET ORAL 2 TIMES DAILY PRN
Qty: 50 TABLET | Refills: 0 | Status: SHIPPED | OUTPATIENT
Start: 2019-09-17 | End: 2019-08-20 | Stop reason: SDUPTHER

## 2019-08-20 RX ORDER — HYDROCODONE BITARTRATE AND ACETAMINOPHEN 5; 325 MG/1; MG/1
1 TABLET ORAL 2 TIMES DAILY PRN
Qty: 50 TABLET | Refills: 0 | Status: SHIPPED | OUTPATIENT
Start: 2019-08-20 | End: 2019-10-28 | Stop reason: SDUPTHER

## 2019-08-20 NOTE — TELEPHONE ENCOUNTER
Attempted to reach patient to make him aware that our office has sent the prescriptions to [facility] pharmacy several times and they are not able to fill the prescriptions due to errors in the system  [address] would like to know if he is willing to come by the office to  the prescriptions, if so let her know and she will print them for the patient

## 2019-08-20 NOTE — TELEPHONE ENCOUNTER
[facility] [address], so [facility] pharmacy called (Sheryle Pickle) pharmacists and said they never received the hydrocodone-acetaminophen 5/325 mg prescription for the next two fill dates  Please resend  Vazquez Borrego said that by accident she deleted the Nucynta  mg scripts the next two fill dates  Please resend the Nucynta  mg script too  Will make patient aware

## 2019-08-20 NOTE — TELEPHONE ENCOUNTER
SW pharmacist Adam Cheng) states that she does not have any hydrocodone prescriptions on file and she did not fill a prescription for hydrocodone yesterday  The last script for hydrocodone that was filled for the patient was on 7/13/19

## 2019-08-20 NOTE — TELEPHONE ENCOUNTER
2 months worth of both medications sent to pharmacy on file with DNF dates of today and 9/18/19  Thank you

## 2019-08-20 NOTE — TELEPHONE ENCOUNTER
S/w pharmacy, all refills are being rejected because the electronic script indicates they are coming from [facility] and [facility] has no clearance to write scripts in [facility]  (may be coming from Epic/ change context)    Resend Rx:    HYDROcodone-acetaminophen ([address]) 5-325 mg per tablet     Tapentadol HCl ER (NUCYNTA ER) 100 MG TB12     To:  [id number] [address], [place], [place]

## 2019-08-20 NOTE — TELEPHONE ENCOUNTER
I only sent 2 months worth of both medications  I just resent both Kary Dmedgar  Which Hydrocodone RX does she have? Yesterday, I sent one that could've been filled yesterday and only one more with a DNF date of 9/17/19

## 2019-09-19 ENCOUNTER — TRANSCRIBE ORDERS (OUTPATIENT)
Dept: ADMINISTRATIVE | Facility: HOSPITAL | Age: 54
End: 2019-09-19

## 2019-09-19 DIAGNOSIS — M54.5 LOW BACK PAIN, UNSPECIFIED BACK PAIN LATERALITY, UNSPECIFIED CHRONICITY, WITH SCIATICA PRESENCE UNSPECIFIED: Primary | ICD-10-CM

## 2019-09-19 DIAGNOSIS — M54.12 CERVICAL RADICULOPATHY: ICD-10-CM

## 2019-09-19 DIAGNOSIS — G95.20 SPINAL CORD COMPRESSION (HCC): ICD-10-CM

## 2019-10-07 ENCOUNTER — HOSPITAL ENCOUNTER (OUTPATIENT)
Dept: RADIOLOGY | Age: 54
Discharge: HOME | End: 2019-10-07
Payer: COMMERCIAL

## 2019-10-07 ENCOUNTER — HOSPITAL ENCOUNTER (OUTPATIENT)
Dept: RADIOLOGY | Age: 54
End: 2019-10-07
Payer: COMMERCIAL

## 2019-10-07 DIAGNOSIS — M54.12 CERVICAL RADICULOPATHY: ICD-10-CM

## 2019-10-07 DIAGNOSIS — G95.20 SPINAL CORD COMPRESSION (HCC): ICD-10-CM

## 2019-10-07 DIAGNOSIS — M54.50 LOW BACK PAIN: ICD-10-CM

## 2019-10-07 PROCEDURE — 72125 CT NECK SPINE W/O DYE: CPT

## 2019-10-07 PROCEDURE — 72131 CT LUMBAR SPINE W/O DYE: CPT

## 2019-10-28 ENCOUNTER — TELEPHONE (OUTPATIENT)
Dept: PAIN MEDICINE | Facility: MEDICAL CENTER | Age: 54
End: 2019-10-28

## 2019-10-28 DIAGNOSIS — G89.4 PAIN SYNDROME, CHRONIC: ICD-10-CM

## 2019-10-28 DIAGNOSIS — M96.1 POST LAMINECTOMY SYNDROME: ICD-10-CM

## 2019-10-28 RX ORDER — HYDROCODONE BITARTRATE AND ACETAMINOPHEN 5; 325 MG/1; MG/1
1 TABLET ORAL 2 TIMES DAILY PRN
Qty: 50 TABLET | Refills: 0 | Status: SHIPPED | OUTPATIENT
Start: 2019-10-28 | End: 2019-11-04 | Stop reason: SDUPTHER

## 2019-10-28 NOTE — TELEPHONE ENCOUNTER
Pt called stating that [facility] [place] will not fill his hydrocodone and Electa Dance because it is getting called in through the [place] office      Pt can be reached at [phone]

## 2019-10-28 NOTE — TELEPHONE ENCOUNTER
One month refills of [place] in [facility] sent to pharmacy on file  Patient's PT MP was reviewed and appropriate

## 2019-10-28 NOTE — TELEPHONE ENCOUNTER
S/w pharmacist at [facility], states the Nucynta script has  and there is no script for [place] at pharmacy  Last fill of medications was   Pt cancelled sovs for 10/10 and rescheduled for   Would it be possible to get a small script to hold pt over until office visit on  or should he be rescheduled for sooner?

## 2019-10-28 NOTE — TELEPHONE ENCOUNTER
S/w pt, advised of the same  Pt verbalized understanding and appreciation  Advised to cb with any further questions or concerns

## 2019-11-04 ENCOUNTER — CLINICAL SUPPORT (OUTPATIENT)
Dept: PAIN MEDICINE | Facility: CLINIC | Age: 54
End: 2019-11-04
Payer: COMMERCIAL

## 2019-11-04 VITALS
DIASTOLIC BLOOD PRESSURE: 87 MMHG | SYSTOLIC BLOOD PRESSURE: 136 MMHG | BODY MASS INDEX: 26.36 KG/M2 | WEIGHT: 178 LBS | HEIGHT: 69 IN | TEMPERATURE: 98.4 F | HEART RATE: 78 BPM

## 2019-11-04 DIAGNOSIS — M54.16 CHRONIC LUMBAR RADICULOPATHY: ICD-10-CM

## 2019-11-04 DIAGNOSIS — G62.9 NEUROPATHY: ICD-10-CM

## 2019-11-04 DIAGNOSIS — M79.18 MYOFASCIAL PAIN: ICD-10-CM

## 2019-11-04 DIAGNOSIS — M48.061 SPINAL STENOSIS OF LUMBAR REGION, UNSPECIFIED WHETHER NEUROGENIC CLAUDICATION PRESENT: ICD-10-CM

## 2019-11-04 DIAGNOSIS — Z79.891 ENCOUNTER FOR LONG-TERM OPIATE ANALGESIC USE: ICD-10-CM

## 2019-11-04 DIAGNOSIS — M96.1 POST LAMINECTOMY SYNDROME: ICD-10-CM

## 2019-11-04 DIAGNOSIS — F11.20 UNCOMPLICATED OPIOID DEPENDENCE (HCC): ICD-10-CM

## 2019-11-04 DIAGNOSIS — G89.4 CHRONIC PAIN SYNDROME: Primary | ICD-10-CM

## 2019-11-04 DIAGNOSIS — M47.816 LUMBAR SPONDYLOSIS: ICD-10-CM

## 2019-11-04 DIAGNOSIS — G89.4 PAIN SYNDROME, CHRONIC: ICD-10-CM

## 2019-11-04 PROCEDURE — 80305 DRUG TEST PRSMV DIR OPT OBS: CPT | Performed by: ANESTHESIOLOGY

## 2019-11-04 PROCEDURE — 99214 OFFICE O/P EST MOD 30 MIN: CPT | Performed by: ANESTHESIOLOGY

## 2019-11-04 RX ORDER — HYDROCODONE BITARTRATE AND ACETAMINOPHEN 5; 325 MG/1; MG/1
1 TABLET ORAL 2 TIMES DAILY PRN
Qty: 50 TABLET | Refills: 0 | Status: SHIPPED | OUTPATIENT
Start: 2019-11-26 | End: 2019-11-04 | Stop reason: SDUPTHER

## 2019-11-04 RX ORDER — HYDROCODONE BITARTRATE AND ACETAMINOPHEN 5; 325 MG/1; MG/1
1 TABLET ORAL 2 TIMES DAILY PRN
Qty: 50 TABLET | Refills: 0 | Status: SHIPPED | OUTPATIENT
Start: 2020-01-22 | End: 2020-01-30 | Stop reason: SDUPTHER

## 2019-11-04 RX ORDER — HYDROCODONE BITARTRATE AND ACETAMINOPHEN 5; 325 MG/1; MG/1
1 TABLET ORAL 2 TIMES DAILY PRN
Qty: 50 TABLET | Refills: 0 | Status: SHIPPED | OUTPATIENT
Start: 2019-12-24 | End: 2019-11-04 | Stop reason: SDUPTHER

## 2019-11-04 NOTE — PROGRESS NOTES
Assessment  1  Chronic pain syndrome    2  Uncomplicated opioid dependence ([address] )    3  Encounter for long-term opiate analgesic use    4  Chronic lumbar radiculopathy    5  Post laminectomy syndrome    6  Neuropathy    7  Myofascial pain    8  Spinal stenosis of lumbar region, unspecified whether neurogenic claudication present    9  Lumbar spondylosis        Plan  51-year-old male with a history of T8-T12 thoracic decompression and fusion status post dorsal column and peripheral stimulator placement with [employer] device returning for follow-up of thoracic and lumbosacral back pain with radiculopathy in bilateral lower extremities  The patient did have a recent CT of his cervical and lumbar spine revealing degenerative disc disease and spondylosis of the cervical spine  He also has degenerative disc disease and spondylosis of the lumbar spine with mild central and foraminal stenosis at L3-4 and L4-5 with a new left-sided disc herniation at L4-5 and possible left L5 nerve root encroachment  He does have progressive spondylitic foraminal stenosis on the left at L5-S1  The patient has had an increase in his lower back pain and lower extremity radicular symptoms without any recent trauma  He has not had a spinal cord stimulator reprogrammed for about a year  He is currently taking Nucynta  mg q 12 hours and Norco 5/325 mg b i d  P r n , gabapentin 1400 mg b i d  Which is prescribed by Neurology, and baclofen 10 mg b i d  P r n  With about 50% relief  He denies any side effects of the medication  1  I have advised the patient to contact the [employer] representative for reprogramming of his spinal cord stimulator since this has not been reprogrammed for about a year to see if they are able to get better control of his low back and lower extremity symptoms    2  If the patient does not note much relief after 2 weeks of spinal cord stimulator reprogramming, may consider lumbar epidural steroid injection to reduce the inflammatory component of his pain  3  The patient will continue with Nucynta  mg b i d  And prescriptions were sent to the pharmacy with do not fill dates before November 26, 2019, December 24, 2019, and January 22, 2020   4  The patient may continue with hydrocodone/acetaminophen 5/325 mg b i d  P r n  for breakthrough pain, 50 tablets to last 1 month  Prescriptions were sent to the pharmacy with do not fill dates before November 26, 2019, December 24, 2019, and January 22, 2020   5  The patient will continue with gabapentin and baclofen as prescribed  6  I will follow up the patient in 3 months or sooner if needed       There are risks associated with opioid medications, including dependence, addiction and tolerance  The patient understands and agrees to use these medications only as prescribed  Potential side effects of the medications include, but are not limited to, constipation, drowsiness, addiction, impaired judgment and risk of fatal overdose if not taken as prescribed  The patient was warned against driving while taking sedation medications  Sharing medications is a felony  At this point in time, the patient is showing no signs of addiction, abuse, diversion or suicidal ideation  A urine drug screen was collected at today's office visit as part of our medication management protocol  The point of care testing results were appropriate for what was being prescribed  The specimen will be sent for confirmatory testing  The drug screen is medically necessary because the patient is either dependent on opioid medication or is being considered for opioid medication therapy and the results could impact ongoing or future treatment  The drug screen is to evaluate for the presences or absence of prescribed, non-prescribed, and/or illicit drugs/substances      [place] Prescription Drug Monitoring Program report was reviewed and was appropriate       My impressions and treatment recommendations were discussed in detail with the patient who verbalized understanding and had no further questions  Discharge instructions were provided  I personally saw and examined the patient and I agree with the above discussed plan of care  No orders of the defined types were placed in this encounter  No orders of the defined types were placed in this encounter  History of Present Illness    Marc Paulson is a 48 y o  male with a history of T8-T12 thoracic decompression and fusion status post dorsal column and peripheral stimulator placement with [employer] device returning for follow-up of thoracic and lumbosacral back pain with radiculopathy in bilateral lower extremities  The patient did have a recent CT of his cervical and lumbar spine revealing degenerative disc disease and spondylosis of the cervical spine  He also has degenerative disc disease and spondylosis of the lumbar spine with mild central and foraminal stenosis at L3-4 and L4-5 with a new left-sided disc herniation at L4-5 and possible left L5 nerve root encroachment  He does have progressive spondylitic foraminal stenosis on the left at L5-S1  The patient has had an increase in his lower back pain and lower extremity radicular symptoms without any recent trauma  He denies any bladder or bowel incontinence or saddle anesthesia  He has not had a spinal cord stimulator reprogrammed for about a year  He is currently taking Nucynta  mg q 12 hours and Norco 5/325 mg b i d  P r n , gabapentin 1400 mg b i d  Which is prescribed by Neurology, and baclofen 10 mg b i d  P r n  With about 50% relief  He denies any side effects of the medication  The patient rates his pain as 7/10 on the pain is worse in the morning and at night  The pain is constant and described as burning, dull, aching, sharp, throbbing, cramping, pressure-like, shooting, numbness, and pins and needles  The pain is worse with standing, walking, bending, and exercise    The pain is alleviated with sitting and lying down  I have personally reviewed and/or updated the patient's past medical history, past surgical history, family history, social history, current medications, allergies, and vital signs today  Other than as stated above, the patient denies any interval changes in medications, medical condition, mental condition, symptoms, or allergies since the last office visit  Review of Systems   Constitutional: Negative for fever and unexpected weight change  HENT: Negative for trouble swallowing  Eyes: Negative for visual disturbance  Respiratory: Negative for shortness of breath and wheezing  Cardiovascular: Negative for chest pain and palpitations  Gastrointestinal: Positive for constipation  Negative for diarrhea, nausea and vomiting  Endocrine: Negative for cold intolerance, heat intolerance and polydipsia  Genitourinary: Negative for difficulty urinating and frequency  Musculoskeletal: Positive for gait problem and joint swelling  Negative for arthralgias and myalgias  Decreased ROM, pain in extremity   Skin: Negative for rash  Neurological: Positive for weakness  Negative for dizziness, seizures, syncope and headaches  Hematological: Does not bruise/bleed easily  Psychiatric/Behavioral: Negative for dysphoric mood  All other systems reviewed and are negative        Patient Active Problem List   Diagnosis    Cervical radiculopathy    Chronic bilateral low back pain    Chronic lumbar radiculopathy    Complete rupture of rotator cuff    Constipation    Degenerative disc disease, lumbar    Type 2 or unspecified type diabetes mellitus    Dysphagia    Facial pain    Gait disturbance    GERD with stricture    Hypercholesterolemia    Hyperlipidemia    Hypertension, essential    ED (erectile dysfunction) of organic origin    Kyphoscoliosis    Limb pain    Major depressive disorder, single episode    Mass of thoracic vertebra  Muscle weakness    Myofascial pain    Neuropathy    Opioid dependence (HCC)    Other congenital malformations of spine, 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    Other spondylosis with myelopathy, thoracic region    Pain syndrome, chronic    Post laminectomy syndrome    Kyphosis, postlaminectomy    Post-thoracotomy pain    Rib pain on right side    Thoracic disc herniation    Thoracic spinal stenosis    TMJ arthralgia    Tobacco use disorder    Trigeminal neuralgia    Trigger finger of both hands    Type 2 diabetes mellitus without complication, without long-term current use of insulin ([facility] [address] )    Encounter for long-term opiate analgesic use    DJD of left AC (acromioclavicular) joint    Left shoulder pain       No past medical history on file  No past surgical history on file  No family history on file      Social History     Occupational History    Not on file   Tobacco Use    Smoking status: Current Every Day Smoker     Types: Cigarettes    Smokeless tobacco: Never Used   Substance and Sexual Activity    Alcohol use: No    Drug use: No    Sexual activity: Not on file       Current Outpatient Medications on File Prior to Visit   Medication Sig    atorvastatin (LIPITOR) 80 mg tablet Take by mouth    baclofen 10 mg tablet Take 1 PO BID PRN myofascial pain    CHANTIX STARTING MONTH ELÍAS 0 5 MG X 11 & 1 MG X 42 tablet Take by mouth daily As directed    gabapentin (NEURONTIN) 600 MG tablet Take 1 tablet by mouth 2 (two) times a day    gabapentin (NEURONTIN) 800 mg tablet Take 1 tablet by mouth 2 (two) times a day    HYDROcodone-acetaminophen (NORCO) 5-325 mg per tablet Take 1 tablet by mouth 2 (two) times a day as needed for painMax Daily Amount: 2 tablets    LamoTRIgine 25 (21)-50 (7) MG KIT USE 1 KIT AS DIRECTED    lidocaine (LIDODERM) 5 % Apply 1 patch topically    lisinopril-hydrochlorothiazide (PRINZIDE,ZESTORETIC) 20-12 5 MG per tablet Take 1 tablet by mouth daily    metFORMIN (GLUCOPHAGE) 1000 MG tablet Take 1 tablet by mouth 2 (two) times a day    OXcarbazepine (TRILEPTAL) 600 mg tablet 2 TABLET(S) BY MOUTH TWO TIMES DAILY    Tapentadol HCl ER (NUCYNTA ER) 100 MG TB12 Take 1 tablet (100 mg total) by mouth every 12 (twelve) hoursMax Daily Amount: 200 mg    VIMOVO 500-20 MG TBEC Take 1 tablet by mouth 2 (two) times a day as needed (for pain)     No current facility-administered medications on file prior to visit  Allergies   Allergen Reactions    Other     Sulfa Antibiotics Edema     Annotation - [id number]: face and hands redness and swelling       Hydrocodone and Nucynta last taken: 10/04/19  New UDS: 11/04/19  New contract: 11/04/19      Physical Exam    /87   Pulse 78   Temp 98 4 °F (36 9 °C) (Oral)   Ht 5' 9" (1 753 m)   Wt 80 7 kg (178 lb)   BMI 26 29 kg/m²     Constitutional: normal, well developed, well nourished, alert, in no distress and non-toxic and no overt pain behavior  Eyes: anicteric  HEENT: grossly intact  Neck: supple, symmetric, trachea midline and no masses   Pulmonary:even and unlabored  Cardiovascular:No edema or pitting edema present  Skin:Normal without rashes or lesions and well hydrated  Psychiatric:Mood and affect appropriate  Neurologic:Cranial Nerves II-XII grossly intact  Musculoskeletal:normal gait  Bilateral thoracic paraspinals mildly tender to palpation ropy in texture with well-healed vertical midline incision  Bilateral lumbar paraspinals tender to palpation ropy in texture from L2-L5  Bilateral lower extremity strength 5/5 in all muscle groups  Sensation intact to light touch in L3 through S1 dermatomes bilaterally  Negative seated straight leg raise bilaterally  Imaging        Images      Show images for CT spine cervical wo contrast   Study Result     CT CERVICAL SPINE - WITHOUT CONTRAST     INDICATION:   G95 20: Unspecified cord compression  M54 5: Low back pain  M54 12:  Radiculopathy, cervical region      COMPARISON: 5/31/2016     TECHNIQUE:  CT examination of the cervical spine was performed without intravenous contrast   Contiguous axial images were obtained  Sagittal and coronal reconstructions were performed        Radiation dose length product (DLP) for this visit:  660 mGy-cm   This examination, like all CT scans performed in the [facility], was performed utilizing techniques to minimize radiation dose exposure, including the use of iterative   reconstruction and automated exposure control        IMAGE QUALITY:  Diagnostic      FINDINGS:     ALIGNMENT:  Normal alignment of the cervical spine  No subluxation      VERTEBRAL BODIES:  There is no fracture  There is no lytic or destructive lesion  The patient has undergone a previous left retromastoid occipital craniectomy     DEGENERATIVE CHANGES:  Minimal endplate and uncinate joint hypertrophic changes noted at C3-4, C5-6     PREVERTEBRAL AND PARASPINAL SOFT TISSUES:  8 chest wall electronic device is present with a lead projecting into the soft tissues overlying the right mastoid temporal bone, unchanged from prior examination      THORACIC INLET:  Normal      IMPRESSION:     No evidence of disc herniation, canal stenosis or foraminal narrowing      Previous left paramedian retromastoid craniectomy appears unchanged                  Workstation performed: [id number]         PACS Images      Show images for CT spine lumbar wo contrast   Study Result     CT LUMBAR SPINE     INDICATION:   G95 20: Unspecified cord compression  M54 5: Low back pain  M54 12: Radiculopathy, cervical region      COMPARISON: 5/5/2015 CT     TECHNIQUE:  Contiguous axial images through the lumbar spine were obtained  Sagittal and coronal reconstructions were performed        Radiation dose length product (DLP) for this visit:  1086 mGy-cm     This examination, like all CT scans performed in the [facility], was performed utilizing techniques to minimize radiation dose exposure, including the use of iterative   reconstruction and automated exposure control        IMAGE QUALITY:  Diagnostic      FINDINGS:     ALIGNMENT:  Normal alignment of the lumbar spine  No spondylolisthesis or spondylolysis      VERTEBRAL BODIES:  No fracture  No lytic or blastic lesion      DEGENERATIVE CHANGES:     Lower Thoracic spine:  Normal lower thoracic disc spaces  ]     L1-2:  Normal disc height  No herniation  Normal facet joints  No canal or foraminal stenosis      L2-3:  Normal disc height  No herniation  Normal facet joints    No canal or foraminal stenosis      L3-4: Progressive mild-to-moderate degenerative spondylosis, mild central canal and bilateral foraminal stenosis     L4-5:  Progressive mild to moderate degenerative spondylosis, mild central canal and bilateral foraminal stenosis, new small broad left-sided disc protrusion, possible left L5 nerve root encroachment      L5-S1:  Progressive moderate degenerative spondylosis, moderate to severe left foraminal stenosis, possible left L5 nerve root encroachment      PARASPINAL SOFT TISSUES:   Normal      IMPRESSION:     Progressive multilevel degenerative spondylosis     Progressive mild central canal and bilateral foraminal stenosis L3-4     Progressive mild central canal and bilateral foraminal stenosis, new small left-sided disc protrusion L4-5     Progressive moderate to severe left foraminal stenosis L5-S1           Workstation performed: [id number]

## 2019-11-06 LAB
6MAM UR QL CFM: NEGATIVE NG/ML
7-OH-MITRAGYNINE (KRATOM ALKALOID) QUANTIFICATION: NEGATIVE NG/ML
7AMINOCLONAZEPAM UR QL CFM: NEGATIVE NG/ML
A-OH ALPRAZ UR QL CFM: NEGATIVE NG/ML
AMPHET UR QL CFM: NEGATIVE NG/ML
AMPHET UR QL CFM: NEGATIVE NG/ML
B-HCG UR QL: NEGATIVE NG/ML
BUPRENORPHINE UR QL CFM: NEGATIVE NG/ML
BUTALBITAL UR QL CFM: NEGATIVE NG/ML
BZE UR QL CFM: NEGATIVE NG/ML
CODEINE UR QL CFM: NEGATIVE NG/ML
CONFIRM APTT STACLOT: NORMAL
DEPRECATED SCALLOP IGE RAST QL: NEGATIVE NG/ML
DESIPRAMINE UR QL CFM: NEGATIVE NG/ML
DESIPRAMINE UR QL CFM: NEGATIVE NG/ML
EDDP UR QL CFM: NEGATIVE NG/ML
ETHYL GLUCURONIDE UR QL CFM: NEGATIVE NG/ML
ETHYL SULFATE UR QL SCN: NEGATIVE NG/ML
FENTANYL UR QL CFM: NEGATIVE NG/ML
GLUCOSE 30M P 50 G LAC PO SERPL-MCNC: NEGATIVE NG/ML
HYDROCODONE UR CFM-MCNC: ABNORMAL NG/ML
HYDROCODONE UR CFM-MCNC: ABNORMAL NG/ML
HYDROMORPHONE UR CFM-MCNC: ABNORMAL NG/ML
IMIPRAMINE UR QL CFM: NEGATIVE NG/ML
LORAZEPAM UR QL CFM: NEGATIVE NG/ML
M PROTEIN 3 SERPL ELPH-MCNC: NEGATIVE NG/ML
M TB TUBERC IGNF/MITOGEN IGNF CONTROL: NEGATIVE NG/ML
MDMA UR QL CFM: NEGATIVE NG/ML
MDPV UR CFM-MCNC: NEGATIVE NG/ML
ME-PHENIDATE UR QL CFM: NEGATIVE NG/ML
MEPERIDINE UR QL CFM: NEGATIVE NG/ML
MEPHEDRONE UR QL CFM: NEGATIVE NG/ML
METHADONE UR QL CFM: NEGATIVE NG/ML
METHAMPHET UR QL CFM: NEGATIVE NG/ML
MITOCHONDRIA AB TITR SER IF: NEGATIVE NG/ML
MORPHINE UR QL CFM: NEGATIVE NG/ML
MORPHINE UR QL CFM: NEGATIVE NG/ML
NORBUPRENORPHINE UR QL CFM: NEGATIVE NG/ML
NORDIAZEPAM UR QL CFM: NEGATIVE NG/ML
NORFENTANYL UR QL CFM: NEGATIVE NG/ML
NORHYDROCODONE UR CFM-MCNC: ABNORMAL NG/ML
NORHYDROCODONE UR CFM-MCNC: ABNORMAL NG/ML
NORMEPERIDINE UR QL CFM: NEGATIVE NG/ML
NOROXYCODONE UR QL CFM: NEGATIVE NG/ML
OLANZAPINE QUANTIFICATION: NEGATIVE NG/ML
OPC-3373 QUANTIFICATION: NEGATIVE
OXAZEPAM UR QL CFM: NEGATIVE NG/ML
OXYCODONE UR QL CFM: NEGATIVE NG/ML
OXYMORPHONE UR QL CFM: NEGATIVE NG/ML
OXYMORPHONE UR QL CFM: NEGATIVE NG/ML
PCP UR QL CFM: NEGATIVE NG/ML
PHENOBARB UR QL CFM: NEGATIVE NG/ML
PHENTERMINE UR QL CFM: NEGATIVE NG/ML
SECOBARBITAL UR QL CFM: NEGATIVE NG/ML
SL AMB 3-METHYL-FENTANYL QUANTIFICATION: NORMAL NG/ML
SL AMB 4-ANPP QUANTIFICATION: NORMAL NG/ML
SL AMB 4-FIBF QUANTIFICATION: NORMAL NG/ML
SL AMB 5F-ADB-M7 METABOLITE QUANTIFICATION: NEGATIVE NG/ML
SL AMB AB-FUBINACA-M3 METABOLITE QUANTIFICATION: NEGATIVE NG/ML
SL AMB ACETYL FENTANYL QUANTIFICATION: NORMAL NG/ML
SL AMB ACETYL NORFENTANYL QUANTIFICATION: NORMAL NG/ML
SL AMB ACRYL FENTANYL QUANTIFICATION: NORMAL NG/ML
SL AMB BUTRYL FENTANYL QUANTIFICATION: NORMAL NG/ML
SL AMB CARFENTANIL QUANTIFICATION: NORMAL NG/ML
SL AMB CLOZAPINE QUANTIFICATION: NEGATIVE NG/ML
SL AMB CYCLOPROPYL FENTANYL QUANTIFICATION: NORMAL NG/ML
SL AMB DEXTROMETHORPHAN QUANTIFICATION: NEGATIVE NG/ML
SL AMB DEXTRORPHAN (DEXTROMETHORPHAN METABOLITE) QUANT: NEGATIVE NG/ML
SL AMB DEXTRORPHAN (DEXTROMETHORPHAN METABOLITE) QUANT: NEGATIVE NG/ML
SL AMB FURANYL FENTANYL QUANTIFICATION: NORMAL NG/ML
SL AMB HALOPERIDOL  QUANTIFICATION: NEGATIVE NG/ML
SL AMB HALOPERIDOL METABOLITE QUANTIFICATION: NEGATIVE NG/ML
SL AMB JWH073 METABOLITE QUANTIFICATION: NEGATIVE NG/ML
SL AMB MDMB-FUBINACA-M1 METABOLITE QUANTIFICATION: NEGATIVE NG/ML
SL AMB METHOXYACETYL FENTANYL QUANTIFICATION: NORMAL NG/ML
SL AMB N-DESMETHYL U-47700 QUANTIFICATION: NORMAL NG/ML
SL AMB N-DESMETHYLCLOZAPINE QUANTIFICATION: NEGATIVE NG/ML
SL AMB NOROXYCODONE QUANTIFICATION: NEGATIVE NG/ML
SL AMB NORQUETIAPINE QUANTIFICATION: NEGATIVE NG/ML
SL AMB PHENTERMINE QUANTIFICATION-CREATININE NORMALIZED: NEGATIVE NG/ML
SL AMB PHENTERMINE QUANTIFICATION: NEGATIVE NG/ML
SL AMB QUETIAPINE QUANTIFICATION: NEGATIVE NG/ML
SL AMB RISPERIDONE QUANTIFICATION: NEGATIVE NG/ML
SL AMB U-47700 QUANTIFICATION: NORMAL NG/ML
SPECIMEN DRAWN SERPL: NEGATIVE NG/ML
TAPENTADOL UR CFM-MCNC: ABNORMAL NG/ML
TEMAZEPAM UR QL CFM: NEGATIVE NG/ML
TEMAZEPAM UR QL CFM: NEGATIVE NG/ML
TRAMADOL UR QL CFM: NEGATIVE NG/ML
URATE/CREAT 24H UR: NEGATIVE NG/ML

## 2019-11-12 ENCOUNTER — TELEPHONE (OUTPATIENT)
Dept: RADIOLOGY | Facility: CLINIC | Age: 54
End: 2019-11-12

## 2019-11-12 NOTE — TELEPHONE ENCOUNTER
Pt stopped into office to schedule LESI, he did not get reprogrammed bc the STIM battery has  and pt says he will need surgery to replace the battery  Can we schedule LESI?

## 2019-11-13 NOTE — TELEPHONE ENCOUNTER
Procedure ordered- Please transfer call to [facility] procedure  line if you have pt on phone  Called pt, LMOM for pt to cb to schedule- gave direct line and hours

## 2019-11-13 NOTE — TELEPHONE ENCOUNTER
Okay to schedule L5-S1 LESI which will hopefully help the pain until the patient is able to get his spinal cord stimulator IPG replaced

## 2019-11-14 NOTE — TELEPHONE ENCOUNTER
Called pt, scheduled L5-S1 LESI on Tues 12/03/19 arr at 14:30  Pt had flu shot over 1 week ago  Pt denies all blood thinners/ NSAIDs  Went over pre procedure instructions, NPO 1 hr prior, if sick or on abx needs to call to rs, wear loose, comf clothing- no buttons/zippers, needs   Pt verbalized understanding

## 2019-12-03 ENCOUNTER — HOSPITAL ENCOUNTER (OUTPATIENT)
Dept: RADIOLOGY | Facility: CLINIC | Age: 54
Discharge: HOME | End: 2019-12-03
Attending: ANESTHESIOLOGY | Admitting: ANESTHESIOLOGY
Payer: COMMERCIAL

## 2019-12-03 VITALS
TEMPERATURE: 98.3 F | HEART RATE: 96 BPM | RESPIRATION RATE: 18 BRPM | OXYGEN SATURATION: 98 % | SYSTOLIC BLOOD PRESSURE: 167 MMHG | DIASTOLIC BLOOD PRESSURE: 96 MMHG

## 2019-12-03 DIAGNOSIS — M54.16 LUMBAR RADICULOPATHY: ICD-10-CM

## 2019-12-03 PROCEDURE — 62323 NJX INTERLAMINAR LMBR/SAC: CPT | Performed by: ANESTHESIOLOGY

## 2019-12-03 RX ORDER — METHYLPREDNISOLONE ACETATE 80 MG/ML
80 INJECTION, SUSPENSION INTRA-ARTICULAR; INTRALESIONAL; INTRAMUSCULAR; PARENTERAL; SOFT TISSUE ONCE
Status: COMPLETED | OUTPATIENT
Start: 2019-12-03 | End: 2019-12-03

## 2019-12-03 RX ORDER — LIDOCAINE HYDROCHLORIDE 10 MG/ML
5 INJECTION, SOLUTION EPIDURAL; INFILTRATION; INTRACAUDAL; PERINEURAL ONCE
Status: COMPLETED | OUTPATIENT
Start: 2019-12-03 | End: 2019-12-03

## 2019-12-03 RX ADMIN — METHYLPREDNISOLONE ACETATE 80 MG: 80 INJECTION, SUSPENSION INTRA-ARTICULAR; INTRALESIONAL; INTRAMUSCULAR; SOFT TISSUE at 15:11

## 2019-12-03 RX ADMIN — LIDOCAINE HYDROCHLORIDE 3 ML: 10 INJECTION, SOLUTION EPIDURAL; INFILTRATION; INTRACAUDAL; PERINEURAL at 15:09

## 2019-12-03 RX ADMIN — IOHEXOL 1 ML: 300 INJECTION, SOLUTION INTRAVENOUS at 15:10

## 2019-12-03 NOTE — DISCHARGE INSTR - LAB
Epidural Steroid Injection   WHAT YOU NEED TO KNOW:   An epidural steroid injection (PIETRO) is a procedure to inject steroid medicine into the epidural space  The epidural space is between your spinal cord and vertebrae  Steroids reduce inflammation and fluid buildup in your spine that may be causing pain  You may be given pain medicine along with the steroids  ACTIVITY  · Do not drive or operate machinery today  · No strenuous activity today - bending, lifting, etc   · You may resume normal activites starting tomorrow - start slowly and as tolerated  · You may shower today, but no tub baths or hot tubs  · You may have numbness for several hours from the local anesthetic  Please use caution and common sense, especially with weight-bearing activities  CARE OF THE INJECTION SITE  · If you have soreness or pain, apply ice to the area today (20 minutes on/20 minutes off)  · Starting tomorrow, you may use warm, moist heat or ice if needed  · You may have an increase or change in your discomfort for 36-48 hours after your treatment  · Apply ice and continue with any pain medication you have been prescribed  · Notify [facility] if you have any of the following: redness, drainage, swelling, headache, stiff neck or fever above 100°F     SPECIAL INSTRUCTIONS  · Our office will contact you in approximately 7 days for a progress report  MEDICATIONS  · Continue to take all routine medications  · Our office may have instructed you to hold some medications  If you have a problem specifically related to your procedure, please call our office at [phone]  Problems not related to your procedure should be directed to your primary care physician

## 2019-12-03 NOTE — H&P
History of Present Illness: The patient is a 48 y o  male who presents with complaints of low back and leg pain  Patient Active Problem List   Diagnosis    Cervical radiculopathy    Chronic bilateral low back pain    Chronic lumbar radiculopathy    Complete rupture of rotator cuff    Constipation    Degenerative disc disease, lumbar    Type 2 or unspecified type diabetes mellitus    Dysphagia    Facial pain    Gait disturbance    GERD with stricture    Hypercholesterolemia    Hyperlipidemia    Hypertension, essential    ED (erectile dysfunction) of organic origin    Kyphoscoliosis    Limb pain    Major depressive disorder, single episode    Mass of thoracic vertebra    Muscle weakness    Myofascial pain    Neuropathy    Opioid dependence (HCC)    Other congenital malformations of spine, 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    Other spondylosis with myelopathy, thoracic region    Chronic pain syndrome    Post laminectomy syndrome    Kyphosis, postlaminectomy    Post-thoracotomy pain    Rib pain on right side    Thoracic disc herniation    Thoracic spinal stenosis    TMJ arthralgia    Tobacco use disorder    Trigeminal neuralgia    Trigger finger of both hands    Type 2 diabetes mellitus without complication, without long-term current use of insulin ([facility] [address] )    Encounter for long-term opiate analgesic use    DJD of left AC (acromioclavicular) joint    Left shoulder pain    Spinal stenosis of lumbar region    Lumbar spondylosis       History reviewed  No pertinent past medical history  History reviewed  No pertinent surgical history        Current Outpatient Medications:     atorvastatin (LIPITOR) 80 mg tablet, Take by mouth, Disp: , Rfl:     baclofen 10 mg tablet, Take 1 PO BID PRN myofascial pain, Disp: 60 tablet, Rfl: 2    CHANTIX STARTING MONTH ELÍAS 0 5 MG X 11 & 1 MG X 42 tablet, Take by mouth daily As directed, Disp: , Rfl: 0    gabapentin (NEURONTIN) 600 MG tablet, Take 1 tablet by mouth 2 (two) times a day, Disp: , Rfl:     gabapentin (NEURONTIN) 800 mg tablet, Take 1 tablet by mouth 2 (two) times a day, Disp: , Rfl:     [START ON 1/22/2020] HYDROcodone-acetaminophen (NORCO) 5-325 mg per tablet, Take 1 tablet by mouth 2 (two) times a day as needed for painMax Daily Amount: 2 tablets, Disp: 50 tablet, Rfl: 0    LamoTRIgine 25 (21)-50 (7) MG KIT, USE 1 KIT AS DIRECTED, Disp: , Rfl: 0    lidocaine (LIDODERM) 5 %, Apply 1 patch topically, Disp: , Rfl:     lisinopril-hydrochlorothiazide (PRINZIDE,ZESTORETIC) 20-12 5 MG per tablet, Take 1 tablet by mouth daily, Disp: , Rfl: 3    metFORMIN (GLUCOPHAGE) 1000 MG tablet, Take 1 tablet by mouth 2 (two) times a day, Disp: , Rfl:     OXcarbazepine (TRILEPTAL) 600 mg tablet, 2 TABLET(S) BY MOUTH TWO TIMES DAILY, Disp: , Rfl: 2    [START ON 1/22/2020] Tapentadol HCl ER (NUCYNTA ER) 100 MG TB12, Take 1 tablet (100 mg total) by mouth every 12 (twelve) hoursMax Daily Amount: 200 mg, Disp: 60 tablet, Rfl: 0    Allergies   Allergen Reactions    Other     Sulfa Antibiotics Edema     Annotation - [id number]: face and hands redness and swelling       Physical Exam:   Vitals:    12/03/19 1425   BP: 156/94   Pulse: 73   Resp: 18   Temp: 98 3 °F (36 8 °C)   SpO2: 98%     General: Awake, Alert, Oriented x 3, Mood and affect appropriate  Respiratory: Respirations even and unlabored  Cardiovascular: Peripheral pulses intact; no edema  Musculoskeletal Exam:  Bilateral lumbar paraspinals tender to palpation  ASA Score: 3    Patient/Chart Verification  Patient ID Verified: Verbal  ID Band Applied: No  Consents Confirmed: Procedural, To be obtained in the Pre-Procedure area  H&P( within 30 days) Verified: To be obtained in the Pre-Procedure area  Allergies Reviewed: Yes  Anticoag/NSAID held?: No  Currently on antibiotics?: No    Assessment:   1   Lumbar radiculopathy        Plan: L5-S1 LESI

## 2019-12-10 ENCOUNTER — TELEPHONE (OUTPATIENT)
Dept: PAIN MEDICINE | Facility: CLINIC | Age: 54
End: 2019-12-10

## 2019-12-17 NOTE — TELEPHONE ENCOUNTER
2nd  Week call back     lm to cb with % improvement and pain level       Please obtain this information if pt calls back

## 2020-01-30 ENCOUNTER — OFFICE VISIT (OUTPATIENT)
Dept: PAIN MEDICINE | Facility: CLINIC | Age: 55
End: 2020-01-30
Payer: COMMERCIAL

## 2020-01-30 ENCOUNTER — PROCEDURE VISIT (OUTPATIENT)
Dept: PAIN MEDICINE | Facility: CLINIC | Age: 55
End: 2020-01-30
Payer: COMMERCIAL

## 2020-01-30 VITALS
BODY MASS INDEX: 25.77 KG/M2 | SYSTOLIC BLOOD PRESSURE: 113 MMHG | HEIGHT: 69 IN | HEART RATE: 94 BPM | DIASTOLIC BLOOD PRESSURE: 73 MMHG | WEIGHT: 174 LBS

## 2020-01-30 VITALS
DIASTOLIC BLOOD PRESSURE: 73 MMHG | BODY MASS INDEX: 25.77 KG/M2 | WEIGHT: 174 LBS | HEIGHT: 69 IN | HEART RATE: 94 BPM | SYSTOLIC BLOOD PRESSURE: 113 MMHG

## 2020-01-30 DIAGNOSIS — F11.20 UNCOMPLICATED OPIOID DEPENDENCE (HCC): ICD-10-CM

## 2020-01-30 DIAGNOSIS — M19.012 DJD OF LEFT AC (ACROMIOCLAVICULAR) JOINT: Primary | ICD-10-CM

## 2020-01-30 DIAGNOSIS — M25.512 CHRONIC LEFT SHOULDER PAIN: ICD-10-CM

## 2020-01-30 DIAGNOSIS — G89.4 PAIN SYNDROME, CHRONIC: ICD-10-CM

## 2020-01-30 DIAGNOSIS — M47.816 LUMBAR SPONDYLOSIS: ICD-10-CM

## 2020-01-30 DIAGNOSIS — M96.1 POST LAMINECTOMY SYNDROME: ICD-10-CM

## 2020-01-30 DIAGNOSIS — G89.4 CHRONIC PAIN SYNDROME: Primary | ICD-10-CM

## 2020-01-30 DIAGNOSIS — G89.29 CHRONIC LEFT SHOULDER PAIN: ICD-10-CM

## 2020-01-30 DIAGNOSIS — Z79.891 ENCOUNTER FOR LONG-TERM OPIATE ANALGESIC USE: ICD-10-CM

## 2020-01-30 PROCEDURE — 20606 DRAIN/INJ JOINT/BURSA W/US: CPT | Performed by: ANESTHESIOLOGY

## 2020-01-30 PROCEDURE — 99214 OFFICE O/P EST MOD 30 MIN: CPT | Performed by: NURSE PRACTITIONER

## 2020-01-30 PROCEDURE — 80305 DRUG TEST PRSMV DIR OPT OBS: CPT | Performed by: NURSE PRACTITIONER

## 2020-01-30 RX ORDER — METHYLPREDNISOLONE ACETATE 40 MG/ML
40 INJECTION, SUSPENSION INTRA-ARTICULAR; INTRALESIONAL; INTRAMUSCULAR; SOFT TISSUE ONCE
Status: COMPLETED | OUTPATIENT
Start: 2020-01-30 | End: 2020-01-30

## 2020-01-30 RX ORDER — HYDROCODONE BITARTRATE AND ACETAMINOPHEN 5; 325 MG/1; MG/1
1 TABLET ORAL 2 TIMES DAILY PRN
Qty: 50 TABLET | Refills: 0 | Status: SHIPPED | OUTPATIENT
Start: 2020-02-28 | End: 2020-01-30 | Stop reason: SDUPTHER

## 2020-01-30 RX ORDER — HYDROCODONE BITARTRATE AND ACETAMINOPHEN 5; 325 MG/1; MG/1
1 TABLET ORAL 2 TIMES DAILY PRN
Qty: 50 TABLET | Refills: 0 | Status: SHIPPED | OUTPATIENT
Start: 2020-03-27 | End: 2020-04-23 | Stop reason: SDUPTHER

## 2020-01-30 RX ORDER — BUPIVACAINE HYDROCHLORIDE 2.5 MG/ML
10 INJECTION, SOLUTION EPIDURAL; INFILTRATION; INTRACAUDAL ONCE
Status: COMPLETED | OUTPATIENT
Start: 2020-01-30 | End: 2020-01-30

## 2020-01-30 RX ADMIN — METHYLPREDNISOLONE ACETATE 40 MG: 40 INJECTION, SUSPENSION INTRA-ARTICULAR; INTRALESIONAL; INTRAMUSCULAR; SOFT TISSUE at 12:34

## 2020-01-30 RX ADMIN — BUPIVACAINE HYDROCHLORIDE 10 ML: 2.5 INJECTION, SOLUTION EPIDURAL; INFILTRATION; INTRACAUDAL at 12:33

## 2020-01-30 NOTE — PROGRESS NOTES
Medium joint arthrocentesis: L acromioclavicular  Date/Time: 1/30/2020 9:52 AM  Consent given by: patient  Site marked: site marked  Timeout: Immediately prior to procedure a time out was called to verify the correct patient, procedure, equipment, support staff and site/side marked as required   Supporting Documentation  Indications: pain   Procedure Details  Location: shoulder - L acromioclavicular  Preparation: Patient was prepped and draped in the usual sterile fashion  Needle size: 25 G  Ultrasound guidance: yes  Medication group details: 2 cc of 0 25% bupivacaine and 40 mg of Depo-Medrol  Ultrasound-guided left acromioclavicular joint injection     Indication:  Left shoulder pain  Preoperative diagnosis:  Degenerative joint disease of the left AC joint  Postoperative diagnosis:  Degenerative joint disease of the left AC joint  Procedure: Ultrasound-guided left subacromial AC joint injection     After discussing the risks, benefits, and alternatives to the procedure, the patient expressed understanding and wished to proceed  The patient was brought to the procedure suite and placed in the side lying position  A procedural pause was conducted to verify: correct patient identity, procedure to be performed and as applicable, correct side and site, correct patient position, and availability of implants, special equipment, or special requirements  A simple surgical tray was used  Prior to the procedure, the left shoulder was examined with a 12MHz linear transducer to visualize the acromioclavicular joint and determine the optimal needle path  Following this, the left shoulder was prepared with a Chloraprep scrub, then re-examined using the same transducer, a sterile utrasound transducer cover, and sterile ultrasound transducer gel  Thereafter, using ultrasound guidance, a 2 5 inch 25 guage needle was advanced into the left acromioclavicular joint   After visualization of the tip of the needle in the target area and negative aspiration for blood, a mixture of 40mg of Depo-Medrol in 1cc of 0 25% buivicaine was injected into the left acromioclavicular joint  The patient tolerated the procedure well and there were no apparent complications  After an appropriate amount of observation, the patient was dismissed from the recovery area in good condition under their own power      COMMENTS:  The patient received a total steroid dose of 40mg of Depo-Medrol

## 2020-01-30 NOTE — PATIENT INSTRUCTIONS
Opioid Pain Management   AMBULATORY CARE:   An opioid  is a type of medicine used to treat pain  Examples of opioids are oxycodone, morphine, fentanyl, or codeine  Take opioid medicines as directed, for the condition it is prescribed:  Common problems that may occur when you do not take opioid medicines as directed include the following:  · Health problems  may occur  You may have trouble breathing  You may also develop liver or kidney damage, or stomach bleeding  Any of these health problems can become life-threatening  · Opioid dependence  means your body needs the opioid medicine to keep it from going through withdrawal      · Opioid tolerance  means the opioid does not control pain as well as it used to  You need higher doses of the opioid to get pain relief  · Opioid addiction  means you are not able to control the use of the opioid medicine  You use it when you do not have pain  You crave the opioid medicine  Call 911 or have someone call 911 for any of the following:   · You are breathing slower than normal, or you have trouble breathing  · You cannot be awakened  · You have a seizure  Seek care immediately if:   · Your heart is beating slower than usual     · Your heart feels like it is jumping or fluttering  · You are so sleepy that you cannot stay awake  · You have severe muscle pain or weakness  · You see or hear things that are not real   Contact your healthcare provider if:   · You are too dizzy to stand up  · Your pain gets worse or you have new pain  · You cannot do your usual activities because of side effects from the opioid  · You are constipated or have abdominal pain  · You have questions or concerns about your condition or care  Opioid safety measures:   · Take your medicine as directed  Ask if you need more information on how to take your medicine correctly  Follow up with your healthcare provider regularly  You may need to have your dose adjusted   Do not use opioid medicine if you are pregnant or breastfeeding  · Give your healthcare provider a list of all your medicines  Include any over-the-counter medicines, vitamins, and herbs  It can be dangerous to take opioids with certain other medicines, such as antihistamines  · Keep opioid medicine in a safe place  Store your opioid medicine in a locked cabinet to keep it away from children and others  · Do not drink alcohol while you use opioids  Alcohol use with an opioid medicine can make you sleepy and slow your breathing rate  You may stop breathing completely  · Do not drive or operate heavy machinery after you take opioid medicine  Opioid medicine can make you drowsy and make it hard to concentrate  You may injure yourself or others if you drive or operate heavy machinery while taking your medicine  · Drink fluids and eat high-fiber foods  Fluids and fiber will help prevent constipation  Ask your healthcare provider what fluids are right for you and how much you should drink  Also ask for a list of foods that contain fiber  Follow up with your healthcare provider as directed: You may need to have your dose adjusted  You may be referred to a pain specialist  Write down your questions so you remember to ask them during your visits  © 2017 [employer] Information is for End User's use only and may not be sold, redistributed or otherwise used for commercial purposes  All illustrations and images included in CareNotes® are the copyrighted property of [employer]  or Steven Rivera  The above information is an  only  It is not intended as medical advice for individual conditions or treatments  Talk to your doctor, nurse or pharmacist before following any medical regimen to see if it is safe and effective for you

## 2020-01-30 NOTE — PROGRESS NOTES
Assessment:  1  Chronic pain syndrome    2  Uncomplicated opioid dependence ([address] )    3  Encounter for long-term opiate analgesic use    4  Lumbar spondylosis    5  Pain syndrome, chronic    6  Post laminectomy syndrome        Plan:  The patient continues with low back pain despite L5-S1 LESI with Dr Underwood Late on December 3, 2019 which she does not feel provided any relief  He does have progressive lumbar spondylosis on updated CT scan of the lumbar spine  He also states that his [facility] spinal cord stimulator battery might be dead weight is to follow-up with them regarding this  1  We will schedule the patient for bilateral L3-5 medial branch blocks with intention of moving forward towards radiofrequency ablation if there is an appropriate diagnostic response  The initial blocks will be performed with 2% lidocaine and if an appropriate response is obtained upon review of the patient's pain diary, a confirmatory block will be scheduled  The patient was agreeable and verbalized an understanding  2  Patient may continue Nucynta  mg Q 12 for pain and Norco 5/325 mg 1 tablet b i d  P r n  breakthrough pain  The patient was given a 3 month supply of prescriptions with a Do Not Fill date(s) of    [place] Prescription Drug Monitoring Program report was reviewed and was appropriate     A urine drug screen was collected at today's office visit as part of our medication management protocol  The point of care testing results were appropriate for what was being prescribed  The specimen will be sent for confirmatory testing  The drug screen is medically necessary because the patient is either dependent on opioid medication or is being considered for opioid medication therapy and the results could impact ongoing or future treatment  The drug screen is to evaluate for the presences or absence of prescribed, non-prescribed, and/or illicit drugs/substances      There are risks associated with opioid medications, including dependence, addiction and tolerance  The patient understands and agrees to use these medications only as prescribed  Potential side effects of the medications include, but are not limited to, constipation, drowsiness, addiction, impaired judgment and risk of fatal overdose if not taken as prescribed  The patient was warned against driving while taking sedation medications  Sharing medications is a felony  At this point in time, the patient is showing no signs of addiction, abuse, diversion or suicidal ideation  3  Patient may continue baclofen as prescribed  He does not need a refill of this medication at this time  4  Patient may continue gabapentin as prescribed  5  The patient will follow-up in 3 months for medication prescription refill and reevaluation  The patient was advised to contact the office should their symptoms worsen in the interim  The patient was agreeable and verbalized an understanding  M*Modal software was used to dictate this note  It may contain errors with dictating incorrect words or incorrect spelling  Please contact the provider directly with any questions  History of Present Illness: The patient is a 47 y o  male with a history of T8-T12 thoracic decompression and fusion status post dorsal column and peripheral stimulator placement with [employer] and [facility] last seen on 11/4/19 who presents for a follow up office visit in regards to chronic lumbosacral back pain that radiates into the lower extremities secondary to lumbar degenerative disc disease, lumbar spondylosis, lumbar stenosis, cervical spondylosis and stenosis  The patient denies bowel or bladder incontinence or saddle anesthesia  The patient is status post L5-S1 LESI with Dr Ruth Hernandez on December 3, 2019 and reports that he did not receive much relief of his low back pain from this procedure    He also states that he did meet with [employer] and is unsure, but feels that his spinal cord stimulator battery is dead  He does plan to follow back up with them in the near future  The patient currently rates his pain a 6/10 on the numeric pain rating scale  He states his pain is constant nature and bothersome the morning and at night  He characterizes the pain as burning, dull aching, sharp, throbbing, cramping, pressure like, shooting, numbness and pins and needles  Current pain medications includes:  Nucynta  mg q 12 hours and Norco 5/325 mg b i d  P r n  breakthrough pain, baclofen 10 mg b i d  P r n  And gabapentin 1400 mg b i d  As prescribed by Neurology    The patient reports that this regimen is providing 50% pain relief  The patient is reporting no side effects from this pain medication regimen  Pain Contract Signed: 11/4/19  Last Urine Drug Screen: 1/30/20  Nucynta last taken 1/30/20 per pt  Norco last taken 1/30/20 per pt  I have personally reviewed and/or updated the patient's past medical history, past surgical history, family history, social history, current medications, allergies, and vital signs today  Review of Systems:    Review of Systems   Respiratory: Negative for shortness of breath  Cardiovascular: Negative for chest pain  Gastrointestinal: Negative for constipation, diarrhea, nausea and vomiting  Musculoskeletal: Positive for gait problem  Negative for arthralgias, joint swelling and myalgias  Skin: Negative for rash  Neurological: Positive for weakness  Negative for dizziness and seizures  All other systems reviewed and are negative  No past medical history on file  No past surgical history on file  No family history on file      Social History     Occupational History    Not on file   Tobacco Use    Smoking status: Current Every Day Smoker     Types: Cigarettes    Smokeless tobacco: Never Used   Substance and Sexual Activity    Alcohol use: No    Drug use: No    Sexual activity: Not on file         Current Outpatient Medications:   atorvastatin (LIPITOR) 80 mg tablet, Take by mouth, Disp: , Rfl:     baclofen 10 mg tablet, Take 1 PO BID PRN myofascial pain, Disp: 60 tablet, Rfl: 2    CHANTIX STARTING MONTH ELÍAS 0 5 MG X 11 & 1 MG X 42 tablet, Take by mouth daily As directed, Disp: , Rfl: 0    diclofenac sodium (VOLTAREN) 1 %, Apply 1 application topically 4 (four) times a day, Disp: , Rfl:     gabapentin (NEURONTIN) 600 MG tablet, Take 1 tablet by mouth 2 (two) times a day, Disp: , Rfl:     gabapentin (NEURONTIN) 800 mg tablet, Take 1 tablet by mouth 2 (two) times a day, Disp: , Rfl:     HYDROcodone-acetaminophen (NORCO) 5-325 mg per tablet, Take 1 tablet by mouth 2 (two) times a day as needed for painMax Daily Amount: 2 tablets, Disp: 50 tablet, Rfl: 0    LamoTRIgine 25 (21)-50 (7) MG KIT, USE 1 KIT AS DIRECTED, Disp: , Rfl: 0    lidocaine (LIDODERM) 5 %, Apply 1 patch topically, Disp: , Rfl:     lisinopril-hydrochlorothiazide (PRINZIDE,ZESTORETIC) 20-12 5 MG per tablet, Take 1 tablet by mouth daily, Disp: , Rfl: 3    metFORMIN (GLUCOPHAGE) 1000 MG tablet, Take 1 tablet by mouth 2 (two) times a day, Disp: , Rfl:     OXcarbazepine (TRILEPTAL) 600 mg tablet, 2 TABLET(S) BY MOUTH TWO TIMES DAILY, Disp: , Rfl: 2    [START ON 2/20/2020] Tapentadol HCl ER (NUCYNTA ER) 100 MG TB12, Take 1 tablet (100 mg total) by mouth every 12 (twelve) hoursMax Daily Amount: 200 mg, Disp: 60 tablet, Rfl: 0    Allergies   Allergen Reactions    Other     Sulfa Antibiotics Edema     Annotation - [id number]: face and hands redness and swelling       Physical Exam:    /73   Pulse 94   Ht 5' 9" (1 753 m)   Wt 78 9 kg (174 lb)   BMI 25 70 kg/m²     Constitutional:normal, well developed, well nourished, alert, in no distress and non-toxic and no overt pain behavior    Eyes:anicteric  HEENT:grossly intact  Neck:supple, symmetric, trachea midline and no masses   Pulmonary:even and unlabored  Cardiovascular:No edema or pitting edema present  Skin:Normal without rashes or lesions and well hydrated  Psychiatric:Mood and affect appropriate  Neurologic:Cranial Nerves II-XII grossly intact  Musculoskeletal:normal gait  Lumbar facet loading maneuvers reproduces patient's low back pain      Imaging  FL spine and pain procedure    (Results Pending)     CT LUMBAR SPINE     INDICATION:   G95 20: Unspecified cord compression  M54 5: Low back pain  M54 12: Radiculopathy, cervical region      COMPARISON: 5/5/2015 CT     TECHNIQUE:  Contiguous axial images through the lumbar spine were obtained  Sagittal and coronal reconstructions were performed        Radiation dose length product (DLP) for this visit:  1086 mGy-cm   This examination, like all CT scans performed in the [facility], was performed utilizing techniques to minimize radiation dose exposure, including the use of iterative   reconstruction and automated exposure control        IMAGE QUALITY:  Diagnostic      FINDINGS:     ALIGNMENT:  Normal alignment of the lumbar spine  No spondylolisthesis or spondylolysis      VERTEBRAL BODIES:  No fracture  No lytic or blastic lesion      DEGENERATIVE CHANGES:     Lower Thoracic spine:  Normal lower thoracic disc spaces  ]     L1-2:  Normal disc height  No herniation  Normal facet joints  No canal or foraminal stenosis      L2-3:  Normal disc height  No herniation  Normal facet joints    No canal or foraminal stenosis      L3-4: Progressive mild-to-moderate degenerative spondylosis, mild central canal and bilateral foraminal stenosis     L4-5:  Progressive mild to moderate degenerative spondylosis, mild central canal and bilateral foraminal stenosis, new small broad left-sided disc protrusion, possible left L5 nerve root encroachment      L5-S1:  Progressive moderate degenerative spondylosis, moderate to severe left foraminal stenosis, possible left L5 nerve root encroachment      PARASPINAL SOFT TISSUES: Normal      IMPRESSION:     Progressive multilevel degenerative spondylosis     Progressive mild central canal and bilateral foraminal stenosis L3-4     Progressive mild central canal and bilateral foraminal stenosis, new small left-sided disc protrusion L4-5     Progressive moderate to severe left foraminal stenosis L5-S1            CT CERVICAL SPINE - WITHOUT CONTRAST     INDICATION:   G95 20: Unspecified cord compression  M54 5: Low back pain  M54 12: Radiculopathy, cervical region      COMPARISON:  5/31/2016     TECHNIQUE:  CT examination of the cervical spine was performed without intravenous contrast   Contiguous axial images were obtained  Sagittal and coronal reconstructions were performed        Radiation dose length product (DLP) for this visit:  660 mGy-cm   This examination, like all CT scans performed in the [facility], was performed utilizing techniques to minimize radiation dose exposure, including the use of iterative   reconstruction and automated exposure control        IMAGE QUALITY:  Diagnostic      FINDINGS:     ALIGNMENT:  Normal alignment of the cervical spine  No subluxation      VERTEBRAL BODIES:  There is no fracture  There is no lytic or destructive lesion    The patient has undergone a previous left retromastoid occipital craniectomy     DEGENERATIVE CHANGES:  Minimal endplate and uncinate joint hypertrophic changes noted at C3-4, C5-6     PREVERTEBRAL AND PARASPINAL SOFT TISSUES:  8 chest wall electronic device is present with a lead projecting into the soft tissues overlying the right mastoid temporal bone, unchanged from prior examination      THORACIC INLET:  Normal      IMPRESSION:     No evidence of disc herniation, canal stenosis or foraminal narrowing      Previous left paramedian retromastoid craniectomy appears unchanged            Orders Placed This Encounter   Procedures    FL spine and pain procedure    MM ALL_Prescribed Meds and Special Instructions    MM DT_Alprazolam Definitive Test    MM DT_Amphetamine Definitive Test    MM DT_Aripiprazole Definitive Test    MM DT_Bath Salts Definitive Test    MM DT_Buprenorphine Definitive Test    MM DT_Butalbital Definitive Test    MM DT_Clonazepam Definitive Test    MM DT_Clozapine Definitive Test    MM DT_Cocaine Definitive Test    MM DT_Codeine Definitive Test    MM DT_Desipramine Definitive Test    MM DT_Dextromethorphan Definitive Test    MM Diazepam Definitive Test    MM DT_Ethyl Glucuronide/Ethyl Sulfate Definitive Test    MM DT_Fentanyl Definitive Test    MM DT_Haloperidol Definitive Test    MM DT_Heroin Definitive Test    MM DT_Hydrocodone Definitive Test    MM DT_Hydromorphone Definitive Test    MM DT_Imipramine Definitive Test    MM DT_Kratom Definitive Test    MM DT_Levorphanol Definitive Test    MM Lorazepam Definitive Test    MM DT_MDMA Definitive Test    MM DT_Meperidine Definitive Test    MM DT_Methadone Definitive Test    MM DT_Methamphetamine Definitive Test    MM DT_Morphine Definitive Test    MM DT_Olanzapine Definitive Test    MM DT_Oxazepam Definitive Test    MM DT_Oxycodone Definitive Test    MM DT_Oxymorphone Definitive Test    MM DT_Phencyclidine Definitive Test    MM DT_Phenobarbital Definitive Test    MM DT_Phentermine Definitive Test    MM DT_Quetiapine Definitive Test    MM DT_Risperidone Definitive Test    MM DT_Secobarbital Definitive Test    MM DT_Spice Definitive Test    MM DT_Tapentadol Definitive Test    MM DT_Temazapam Definitive Test    MM DT_THC Definitive Test    MM DT_Tramadol Definitive Test    MM DT_Methylphenidate Definitive Test

## 2020-02-01 LAB
6MAM UR QL CFM: NEGATIVE NG/ML
7AMINOCLONAZEPAM UR QL CFM: NEGATIVE NG/ML
A-OH ALPRAZ UR QL CFM: NEGATIVE NG/ML
AMPHET UR QL CFM: NEGATIVE NG/ML
AMPHET UR QL CFM: NEGATIVE NG/ML
BUPRENORPHINE UR QL CFM: NEGATIVE NG/ML
BUTALBITAL UR QL CFM: NEGATIVE NG/ML
BZE UR QL CFM: NEGATIVE NG/ML
CODEINE UR QL CFM: NEGATIVE NG/ML
DESIPRAMINE UR QL CFM: NEGATIVE NG/ML
DESIPRAMINE UR QL CFM: NEGATIVE NG/ML
EDDP UR QL CFM: NEGATIVE NG/ML
ETHYL GLUCURONIDE UR QL CFM: NEGATIVE NG/ML
ETHYL SULFATE UR QL SCN: NEGATIVE NG/ML
FENTANYL UR QL CFM: NEGATIVE NG/ML
GLIADIN IGG SER IA-ACNC: NEGATIVE NG/ML
GLUCOSE 30M P 50 G LAC PO SERPL-MCNC: NEGATIVE NG/ML
HYDROCODONE UR CFM-MCNC: 429.19 NG/ML
HYDROCODONE UR CFM-MCNC: 429.19 NG/ML
HYDROMORPHONE UR CFM-MCNC: 171.78 NG/ML
IMIPRAMINE UR QL CFM: NEGATIVE NG/ML
LORAZEPAM UR QL CFM: NEGATIVE NG/ML
MDMA UR QL CFM: NEGATIVE NG/ML
ME-PHENIDATE UR QL CFM: NEGATIVE NG/ML
MEPERIDINE UR QL CFM: NEGATIVE NG/ML
MEPHEDRONE UR QL CFM: NEGATIVE NG/ML
METHADONE UR QL CFM: NEGATIVE NG/ML
METHAMPHET UR QL CFM: NEGATIVE NG/ML
MORPHINE UR QL CFM: NEGATIVE NG/ML
MORPHINE UR QL CFM: NEGATIVE NG/ML
NORBUPRENORPHINE UR QL CFM: NEGATIVE NG/ML
NORDIAZEPAM UR QL CFM: NEGATIVE NG/ML
NORFENTANYL UR QL CFM: NEGATIVE NG/ML
NORHYDROCODONE UR CFM-MCNC: 569.7 NG/ML
NORHYDROCODONE UR CFM-MCNC: 569.7 NG/ML
NORMEPERIDINE UR QL CFM: NEGATIVE NG/ML
NOROXYCODONE UR QL CFM: NEGATIVE NG/ML
OLANZAPINE QUANTIFICATION: NEGATIVE NG/ML
OPC-3373 QUANTIFICATION: NEGATIVE
OXAZEPAM UR QL CFM: NEGATIVE NG/ML
OXYCODONE UR QL CFM: NEGATIVE NG/ML
OXYMORPHONE UR QL CFM: NEGATIVE NG/ML
OXYMORPHONE UR QL CFM: NEGATIVE NG/ML
PCP UR QL CFM: NEGATIVE NG/ML
PHENOBARB UR QL CFM: NEGATIVE NG/ML
SECOBARBITAL UR QL CFM: NEGATIVE NG/ML
SL AMB 3-METHYL-FENTANYL QUANTIFICATION: NORMAL NG/ML
SL AMB 4-ANPP QUANTIFICATION: NORMAL NG/ML
SL AMB 4-FIBF QUANTIFICATION: NORMAL NG/ML
SL AMB 5F-ADB-M7 METABOLITE QUANTIFICATION: NEGATIVE NG/ML
SL AMB 7-OH-MITRAGYNINE (KRATOM ALKALOID) QUANTIFICATION: NEGATIVE NG/ML
SL AMB AB-FUBINACA-M3 METABOLITE QUANTIFICATION: NEGATIVE NG/ML
SL AMB ACETYL FENTANYL QUANTIFICATION: NORMAL NG/ML
SL AMB ACETYL NORFENTANYL QUANTIFICATION: NORMAL NG/ML
SL AMB ACRYL FENTANYL QUANTIFICATION: NORMAL NG/ML
SL AMB BATH SALTS: NEGATIVE NG/ML
SL AMB BUTRYL FENTANYL QUANTIFICATION: NORMAL NG/ML
SL AMB CARFENTANIL QUANTIFICATION: NORMAL NG/ML
SL AMB CLOZAPINE QUANTIFICATION: NEGATIVE NG/ML
SL AMB CTHC (MARIJUANA METABOLITE) QUANTIFICATION: NEGATIVE NG/ML
SL AMB CYCLOPROPYL FENTANYL QUANTIFICATION: NORMAL NG/ML
SL AMB DEXTROMETHORPHAN QUANTIFICATION-MEASURED RESULT: ABNORMAL NG/ML
SL AMB DEXTRORPHAN QUANTIFICATION-MEASURED RESULT: ABNORMAL NG/ML
SL AMB DEXTRORPHAN QUANTIFICATION-MEASURED RESULT: ABNORMAL NG/ML
SL AMB FURANYL FENTANYL QUANTIFICATION: NORMAL NG/ML
SL AMB HALOPERIDOL  QUANTIFICATION: NEGATIVE NG/ML
SL AMB HALOPERIDOL METABOLITE QUANTIFICATION: NEGATIVE NG/ML
SL AMB HYDROXYRISPERIDONE QUANTIFICATION: NEGATIVE NG/ML
SL AMB JWH018 METABOLITE QUANTIFICATION: NEGATIVE NG/ML
SL AMB JWH073 METABOLITE QUANTIFICATION: NEGATIVE NG/ML
SL AMB MDMB-FUBINACA-M1 METABOLITE QUANTIFICATION: NEGATIVE NG/ML
SL AMB METHOXYACETYL FENTANYL QUANTIFICATION: NORMAL NG/ML
SL AMB METHYLONE QUANTIFICATION: NEGATIVE NG/ML
SL AMB N-DESMETHYL U-47700 QUANTIFICATION: NORMAL NG/ML
SL AMB N-DESMETHYL-TRAMADOL QUANTIFICATION: NEGATIVE NG/ML
SL AMB N-DESMETHYLCLOZAPINE QUANTIFICATION: NEGATIVE NG/ML
SL AMB NORQUETIAPINE QUANTIFICATION: NEGATIVE NG/ML
SL AMB PHENTERMINE QUANTIFICATION: NEGATIVE NG/ML
SL AMB QUETIAPINE QUANTIFICATION: NEGATIVE NG/ML
SL AMB RCS4 METABOLITE QUANTIFICATION: NEGATIVE NG/ML
SL AMB RISPERIDONE QUANTIFICATION: NEGATIVE NG/ML
SL AMB RITALINIC ACID QUANTIFICATION: NEGATIVE NG/ML
SL AMB U-47700 QUANTIFICATION: NORMAL NG/ML
SPECIMEN DRAWN SERPL: NEGATIVE NG/ML
TAPENTADOL UR CFM-MCNC: >6400 NG/ML
TEMAZEPAM UR QL CFM: NEGATIVE NG/ML
TEMAZEPAM UR QL CFM: NEGATIVE NG/ML
TRAMADOL UR QL CFM: NEGATIVE NG/ML
URATE/CREAT 24H UR: NEGATIVE NG/ML

## 2020-02-06 ENCOUNTER — TELEPHONE (OUTPATIENT)
Dept: PAIN MEDICINE | Facility: CLINIC | Age: 55
End: 2020-02-06

## 2020-02-10 ENCOUNTER — HOSPITAL ENCOUNTER (OUTPATIENT)
Dept: RADIOLOGY | Facility: CLINIC | Age: 55
Discharge: HOME | End: 2020-02-10
Attending: ANESTHESIOLOGY | Admitting: ANESTHESIOLOGY
Payer: COMMERCIAL

## 2020-02-10 VITALS
RESPIRATION RATE: 20 BRPM | HEART RATE: 72 BPM | TEMPERATURE: 98.1 F | SYSTOLIC BLOOD PRESSURE: 160 MMHG | DIASTOLIC BLOOD PRESSURE: 79 MMHG | OXYGEN SATURATION: 97 %

## 2020-02-10 DIAGNOSIS — M47.816 LUMBAR SPONDYLOSIS: ICD-10-CM

## 2020-02-10 PROCEDURE — 64494 INJ PARAVERT F JNT L/S 2 LEV: CPT | Performed by: ANESTHESIOLOGY

## 2020-02-10 PROCEDURE — 64493 INJ PARAVERT F JNT L/S 1 LEV: CPT | Performed by: ANESTHESIOLOGY

## 2020-02-10 RX ORDER — LIDOCAINE HYDROCHLORIDE 10 MG/ML
10 INJECTION, SOLUTION EPIDURAL; INFILTRATION; INTRACAUDAL; PERINEURAL ONCE
Status: COMPLETED | OUTPATIENT
Start: 2020-02-10 | End: 2020-02-10

## 2020-02-10 RX ADMIN — LIDOCAINE HYDROCHLORIDE 6 ML: 10 INJECTION, SOLUTION EPIDURAL; INFILTRATION; INTRACAUDAL; PERINEURAL at 15:06

## 2020-02-10 RX ADMIN — LIDOCAINE HYDROCHLORIDE 3 ML: 20 INJECTION, SOLUTION EPIDURAL; INFILTRATION; INTRACAUDAL; PERINEURAL at 15:11

## 2020-02-10 NOTE — DISCHARGE INSTR - LAB

## 2020-02-10 NOTE — H&P
History of Present Illness: The patient is a 47 y o  male who presents with complaints of low back pain  Patient Active Problem List   Diagnosis    Cervical radiculopathy    Chronic bilateral low back pain    Lumbar radiculopathy    Complete rupture of rotator cuff    Constipation    Degenerative disc disease, lumbar    Type 2 or unspecified type diabetes mellitus    Dysphagia    Facial pain    Gait disturbance    GERD with stricture    Hypercholesterolemia    Hyperlipidemia    Hypertension, essential    ED (erectile dysfunction) of organic origin    Kyphoscoliosis    Limb pain    Major depressive disorder, single episode    Mass of thoracic vertebra    Muscle weakness    Myofascial pain    Neuropathy    Opioid dependence (HCC)    Other congenital malformations of spine, 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    Other spondylosis with myelopathy, thoracic region    Chronic pain syndrome    Post laminectomy syndrome    Kyphosis, postlaminectomy    Post-thoracotomy pain    Rib pain on right side    Thoracic disc herniation    Thoracic spinal stenosis    TMJ arthralgia    Tobacco use disorder    Trigeminal neuralgia    Trigger finger of both hands    Type 2 diabetes mellitus without complication, without long-term current use of insulin ([facility] [address] )    Encounter for long-term opiate analgesic use    DJD of left AC (acromioclavicular) joint    Left shoulder pain    Spinal stenosis of lumbar region    Lumbar spondylosis    Chronic left shoulder pain       History reviewed  No pertinent past medical history  History reviewed  No pertinent surgical history        Current Outpatient Medications:     atorvastatin (LIPITOR) 80 mg tablet, Take by mouth, Disp: , Rfl:     baclofen 10 mg tablet, Take 1 PO BID PRN myofascial pain, Disp: 60 tablet, Rfl: 2    CHANTIX STARTING MONTH ELÍAS 0 5 MG X 11 & 1 MG X 42 tablet, Take by mouth daily As directed, Disp: , Rfl: 0    diclofenac sodium (VOLTAREN) 1 %, Apply 1 application topically 4 (four) times a day, Disp: , Rfl:     gabapentin (NEURONTIN) 600 MG tablet, Take 1 tablet by mouth 2 (two) times a day, Disp: , Rfl:     gabapentin (NEURONTIN) 800 mg tablet, Take 1 tablet by mouth 2 (two) times a day, Disp: , Rfl:     [START ON 3/27/2020] HYDROcodone-acetaminophen (NORCO) 5-325 mg per tablet, Take 1 tablet by mouth 2 (two) times a day as needed for painMax Daily Amount: 2 tablets, Disp: 50 tablet, Rfl: 0    LamoTRIgine 25 (21)-50 (7) MG KIT, USE 1 KIT AS DIRECTED, Disp: , Rfl: 0    lidocaine (LIDODERM) 5 %, Apply 1 patch topically, Disp: , Rfl:     lisinopril-hydrochlorothiazide (PRINZIDE,ZESTORETIC) 20-12 5 MG per tablet, Take 1 tablet by mouth daily, Disp: , Rfl: 3    metFORMIN (GLUCOPHAGE) 1000 MG tablet, Take 1 tablet by mouth 2 (two) times a day, Disp: , Rfl:     OXcarbazepine (TRILEPTAL) 600 mg tablet, 2 TABLET(S) BY MOUTH TWO TIMES DAILY, Disp: , Rfl: 2    [START ON 4/17/2020] Tapentadol HCl ER (NUCYNTA ER) 100 MG TB12, Take 1 tablet (100 mg total) by mouth every 12 (twelve) hoursMax Daily Amount: 200 mg, Disp: 60 tablet, Rfl: 0    Allergies   Allergen Reactions    Other     Sulfa Antibiotics Edema     Annotation - [id number]: face and hands redness and swelling       Physical Exam:   Vitals:    02/10/20 1442   BP: 132/78   Pulse: 74   Resp: 20   Temp: 98 1 °F (36 7 °C)   SpO2: 98%     General: Awake, Alert, Oriented x 3, Mood and affect appropriate  Respiratory: Respirations even and unlabored  Cardiovascular: Peripheral pulses intact; no edema  Musculoskeletal Exam:  Bilateral lumbar paraspinals tender to palpation  ASA Score: 3    Patient/Chart Verification  Patient ID Verified: Verbal  ID Band Applied: No  Consents Confirmed: Procedural  H&P( within 30 days) Verified: To be obtained in the Pre-Procedure area  Interval H&P(within 24 hr) Complete (required for Outpatients and Surgery Admit only):  To be obtained in the Pre-Procedure area  Allergies Reviewed: Yes  Anticoag/NSAID held?: No  Currently on antibiotics?: No    Assessment:   1   Lumbar spondylosis        Plan: B/L L3-5 MBB

## 2020-02-12 ENCOUNTER — TELEPHONE (OUTPATIENT)
Dept: RADIOLOGY | Facility: CLINIC | Age: 55
End: 2020-02-12

## 2020-02-12 NOTE — TELEPHONE ENCOUNTER
Called pt, scheduled MBB#2 on Wed 02/19/20 arr at 08:45  Went over pre procedure instructions, no vaccinations 2 weeks prior/post proc, NPO 1 hr prior, if sick or on abx needs to call to rs, wear loose, comf clothing- no buttons/zippers, needs   Pt verbalized understanding

## 2020-02-12 NOTE — TELEPHONE ENCOUNTER
Per Dr Klelie Silveira, pt had favorable response to: B/L L3, L4, L5 MBB#1  OK to schedule: MBB#2   Date of last MBB: 02/10/2020

## 2020-02-19 ENCOUNTER — HOSPITAL ENCOUNTER (OUTPATIENT)
Dept: RADIOLOGY | Facility: CLINIC | Age: 55
Discharge: HOME | End: 2020-02-19
Attending: ANESTHESIOLOGY | Admitting: ANESTHESIOLOGY
Payer: COMMERCIAL

## 2020-02-19 VITALS
HEART RATE: 70 BPM | DIASTOLIC BLOOD PRESSURE: 87 MMHG | SYSTOLIC BLOOD PRESSURE: 135 MMHG | TEMPERATURE: 98 F | RESPIRATION RATE: 18 BRPM | OXYGEN SATURATION: 96 %

## 2020-02-19 DIAGNOSIS — M47.816 LUMBAR SPONDYLOSIS: ICD-10-CM

## 2020-02-19 PROCEDURE — 64493 INJ PARAVERT F JNT L/S 1 LEV: CPT | Performed by: ANESTHESIOLOGY

## 2020-02-19 PROCEDURE — 64494 INJ PARAVERT F JNT L/S 2 LEV: CPT | Performed by: ANESTHESIOLOGY

## 2020-02-19 RX ORDER — LIDOCAINE HYDROCHLORIDE 10 MG/ML
10 INJECTION, SOLUTION EPIDURAL; INFILTRATION; INTRACAUDAL; PERINEURAL ONCE
Status: COMPLETED | OUTPATIENT
Start: 2020-02-19 | End: 2020-02-19

## 2020-02-19 RX ORDER — BUPIVACAINE HYDROCHLORIDE 5 MG/ML
30 INJECTION, SOLUTION EPIDURAL; INTRACAUDAL ONCE
Status: COMPLETED | OUTPATIENT
Start: 2020-02-19 | End: 2020-02-19

## 2020-02-19 RX ADMIN — BUPIVACAINE HYDROCHLORIDE 3 ML: 5 INJECTION, SOLUTION EPIDURAL; INTRACAUDAL at 09:03

## 2020-02-19 RX ADMIN — LIDOCAINE HYDROCHLORIDE 6 ML: 10 INJECTION, SOLUTION EPIDURAL; INFILTRATION; INTRACAUDAL; PERINEURAL at 08:59

## 2020-02-19 NOTE — DISCHARGE INSTR - LAB

## 2020-02-19 NOTE — H&P
History of Present Illness: The patient is a 47 y o  male who presents with complaints of low back pain  Patient Active Problem List   Diagnosis    Cervical radiculopathy    Chronic bilateral low back pain    Lumbar radiculopathy    Complete rupture of rotator cuff    Constipation    Degenerative disc disease, lumbar    Type 2 or unspecified type diabetes mellitus    Dysphagia    Facial pain    Gait disturbance    GERD with stricture    Hypercholesterolemia    Hyperlipidemia    Hypertension, essential    ED (erectile dysfunction) of organic origin    Kyphoscoliosis    Limb pain    Major depressive disorder, single episode    Mass of thoracic vertebra    Muscle weakness    Myofascial pain    Neuropathy    Opioid dependence (HCC)    Other congenital malformations of spine, 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    Other spondylosis with myelopathy, thoracic region    Chronic pain syndrome    Post laminectomy syndrome    Kyphosis, postlaminectomy    Post-thoracotomy pain    Rib pain on right side    Thoracic disc herniation    Thoracic spinal stenosis    TMJ arthralgia    Tobacco use disorder    Trigeminal neuralgia    Trigger finger of both hands    Type 2 diabetes mellitus without complication, without long-term current use of insulin ([facility] [address] )    Encounter for long-term opiate analgesic use    DJD of left AC (acromioclavicular) joint    Left shoulder pain    Spinal stenosis of lumbar region    Lumbar spondylosis    Chronic left shoulder pain       History reviewed  No pertinent past medical history  History reviewed  No pertinent surgical history        Current Outpatient Medications:     atorvastatin (LIPITOR) 80 mg tablet, Take by mouth, Disp: , Rfl:     baclofen 10 mg tablet, Take 1 PO BID PRN myofascial pain, Disp: 60 tablet, Rfl: 2    CHANTIX STARTING MONTH ELÍAS 0 5 MG X 11 & 1 MG X 42 tablet, Take by mouth daily As directed, Disp: , Rfl: 0    diclofenac sodium (VOLTAREN) 1 %, Apply 1 application topically 4 (four) times a day, Disp: , Rfl:     gabapentin (NEURONTIN) 600 MG tablet, Take 1 tablet by mouth 2 (two) times a day, Disp: , Rfl:     gabapentin (NEURONTIN) 800 mg tablet, Take 1 tablet by mouth 2 (two) times a day, Disp: , Rfl:     [START ON 3/27/2020] HYDROcodone-acetaminophen (NORCO) 5-325 mg per tablet, Take 1 tablet by mouth 2 (two) times a day as needed for painMax Daily Amount: 2 tablets, Disp: 50 tablet, Rfl: 0    LamoTRIgine 25 (21)-50 (7) MG KIT, USE 1 KIT AS DIRECTED, Disp: , Rfl: 0    lidocaine (LIDODERM) 5 %, Apply 1 patch topically, Disp: , Rfl:     lisinopril-hydrochlorothiazide (PRINZIDE,ZESTORETIC) 20-12 5 MG per tablet, Take 1 tablet by mouth daily, Disp: , Rfl: 3    metFORMIN (GLUCOPHAGE) 1000 MG tablet, Take 1 tablet by mouth 2 (two) times a day, Disp: , Rfl:     OXcarbazepine (TRILEPTAL) 600 mg tablet, 2 TABLET(S) BY MOUTH TWO TIMES DAILY, Disp: , Rfl: 2    [START ON 4/17/2020] Tapentadol HCl ER (NUCYNTA ER) 100 MG TB12, Take 1 tablet (100 mg total) by mouth every 12 (twelve) hoursMax Daily Amount: 200 mg, Disp: 60 tablet, Rfl: 0    Allergies   Allergen Reactions    Other     Sulfa Antibiotics Edema     Annotation - [id number]: face and hands redness and swelling       Physical Exam:   Vitals:    02/19/20 0833   BP: 132/84   Pulse: 74   Resp: 20   Temp: 98 °F (36 7 °C)   SpO2: 97%     General: Awake, Alert, Oriented x 3, Mood and affect appropriate  Respiratory: Respirations even and unlabored  Cardiovascular: Peripheral pulses intact; no edema  Musculoskeletal Exam:  Bilateral lumbar paraspinals tender to palpation  ASA Score: 3    Patient/Chart Verification  Patient ID Verified: Verbal  ID Band Applied: No  Consents Confirmed: Procedural  H&P( within 30 days) Verified: To be obtained in the Pre-Procedure area  Interval H&P(within 24 hr) Complete (required for Outpatients and Surgery Admit only):  To be obtained in the Pre-Procedure area  Allergies Reviewed: Yes  Anticoag/NSAID held?: No  Currently on antibiotics?: No    Assessment:   1   Lumbar spondylosis        Plan: B/L L3, L4, L5 MBB#2

## 2020-03-04 ENCOUNTER — TELEPHONE (OUTPATIENT)
Dept: RADIOLOGY | Facility: CLINIC | Age: 55
End: 2020-03-04

## 2020-03-04 NOTE — TELEPHONE ENCOUNTER
Called pt, scheduled RT L3, L4, L5 RFA on Wed 03/18/20 arr at 13:45, and LT L3, L4, L5 RFA on Wed 04/01/20 arr at 13:30  Went over pre procedure instructions, no vaccinations 2 weeks prior/post proc, NPO 1 hr prior, if sick or on abx needs to call to rs, wear loose, comf clothing- no buttons/zippers, needs   Pt verbalized understanding

## 2020-03-04 NOTE — TELEPHONE ENCOUNTER
Per Dr Germaine Woody, pt had favorable response to: B/L L3, L4, L5 MBB#2  OK to schedule: RT & LT RFAs  Date of last MBB: 02/19/20

## 2020-03-18 ENCOUNTER — HOSPITAL ENCOUNTER (OUTPATIENT)
Dept: RADIOLOGY | Facility: CLINIC | Age: 55
Discharge: HOME | End: 2020-03-18
Attending: ANESTHESIOLOGY | Admitting: ANESTHESIOLOGY
Payer: COMMERCIAL

## 2020-03-18 ENCOUNTER — TELEPHONE (OUTPATIENT)
Dept: RADIOLOGY | Facility: CLINIC | Age: 55
End: 2020-03-18

## 2020-03-18 VITALS
HEART RATE: 79 BPM | DIASTOLIC BLOOD PRESSURE: 75 MMHG | OXYGEN SATURATION: 98 % | RESPIRATION RATE: 20 BRPM | TEMPERATURE: 98.5 F | SYSTOLIC BLOOD PRESSURE: 168 MMHG

## 2020-03-18 DIAGNOSIS — M47.816 LUMBAR SPONDYLOSIS: ICD-10-CM

## 2020-03-18 PROCEDURE — 64635 DESTROY LUMB/SAC FACET JNT: CPT | Performed by: ANESTHESIOLOGY

## 2020-03-18 PROCEDURE — 64636 DESTROY L/S FACET JNT ADDL: CPT | Performed by: ANESTHESIOLOGY

## 2020-03-18 RX ORDER — LIDOCAINE HYDROCHLORIDE 10 MG/ML
10 INJECTION, SOLUTION EPIDURAL; INFILTRATION; INTRACAUDAL; PERINEURAL ONCE
Status: COMPLETED | OUTPATIENT
Start: 2020-03-18 | End: 2020-03-18

## 2020-03-18 RX ORDER — BUPIVACAINE HYDROCHLORIDE 5 MG/ML
30 INJECTION, SOLUTION EPIDURAL; INTRACAUDAL ONCE
Status: COMPLETED | OUTPATIENT
Start: 2020-03-18 | End: 2020-03-18

## 2020-03-18 RX ADMIN — LIDOCAINE HYDROCHLORIDE 6 ML: 10 INJECTION, SOLUTION EPIDURAL; INFILTRATION; INTRACAUDAL; PERINEURAL at 14:04

## 2020-03-18 RX ADMIN — BUPIVACAINE HYDROCHLORIDE 3 ML: 5 INJECTION, SOLUTION EPIDURAL; INTRACAUDAL at 14:11

## 2020-03-18 RX ADMIN — LIDOCAINE HYDROCHLORIDE 3 ML: 20 INJECTION, SOLUTION EPIDURAL; INFILTRATION; INTRACAUDAL; PERINEURAL at 14:08

## 2020-03-18 NOTE — H&P
History of Present Illness: The patient is a 47 y o  male who presents with complaints of low back pain  Patient Active Problem List   Diagnosis    Cervical radiculopathy    Chronic bilateral low back pain    Lumbar radiculopathy    Complete rupture of rotator cuff    Constipation    Degenerative disc disease, lumbar    Type 2 or unspecified type diabetes mellitus    Dysphagia    Facial pain    Gait disturbance    GERD with stricture    Hypercholesterolemia    Hyperlipidemia    Hypertension, essential    ED (erectile dysfunction) of organic origin    Kyphoscoliosis    Limb pain    Major depressive disorder, single episode    Mass of thoracic vertebra    Muscle weakness    Myofascial pain    Neuropathy    Opioid dependence (HCC)    Other congenital malformations of spine, 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    Other spondylosis with myelopathy, thoracic region    Chronic pain syndrome    Post laminectomy syndrome    Kyphosis, postlaminectomy    Post-thoracotomy pain    Rib pain on right side    Thoracic disc herniation    Thoracic spinal stenosis    TMJ arthralgia    Tobacco use disorder    Trigeminal neuralgia    Trigger finger of both hands    Type 2 diabetes mellitus without complication, without long-term current use of insulin ([facility] [address] )    Encounter for long-term opiate analgesic use    DJD of left AC (acromioclavicular) joint    Left shoulder pain    Spinal stenosis of lumbar region    Lumbar spondylosis    Chronic left shoulder pain       History reviewed  No pertinent past medical history  History reviewed  No pertinent surgical history        Current Outpatient Medications:     atorvastatin (LIPITOR) 80 mg tablet, Take by mouth, Disp: , Rfl:     baclofen 10 mg tablet, Take 1 PO BID PRN myofascial pain, Disp: 60 tablet, Rfl: 2    CHANTIX STARTING MONTH ELÍAS 0 5 MG X 11 & 1 MG X 42 tablet, Take by mouth daily As directed, Disp: , Rfl: 0    diclofenac sodium (VOLTAREN) 1 %, Apply 1 application topically 4 (four) times a day, Disp: , Rfl:     gabapentin (NEURONTIN) 600 MG tablet, Take 1 tablet by mouth 2 (two) times a day, Disp: , Rfl:     gabapentin (NEURONTIN) 800 mg tablet, Take 1 tablet by mouth 2 (two) times a day, Disp: , Rfl:     [START ON 3/27/2020] HYDROcodone-acetaminophen (NORCO) 5-325 mg per tablet, Take 1 tablet by mouth 2 (two) times a day as needed for painMax Daily Amount: 2 tablets, Disp: 50 tablet, Rfl: 0    LamoTRIgine 25 (21)-50 (7) MG KIT, USE 1 KIT AS DIRECTED, Disp: , Rfl: 0    lidocaine (LIDODERM) 5 %, Apply 1 patch topically, Disp: , Rfl:     lisinopril-hydrochlorothiazide (PRINZIDE,ZESTORETIC) 20-12 5 MG per tablet, Take 1 tablet by mouth daily, Disp: , Rfl: 3    metFORMIN (GLUCOPHAGE) 1000 MG tablet, Take 1 tablet by mouth 2 (two) times a day, Disp: , Rfl:     OXcarbazepine (TRILEPTAL) 600 mg tablet, 2 TABLET(S) BY MOUTH TWO TIMES DAILY, Disp: , Rfl: 2    [START ON 4/17/2020] Tapentadol HCl ER (NUCYNTA ER) 100 MG TB12, Take 1 tablet (100 mg total) by mouth every 12 (twelve) hoursMax Daily Amount: 200 mg, Disp: 60 tablet, Rfl: 0  No current facility-administered medications for this encounter  Allergies   Allergen Reactions    Other     Sulfa Antibiotics Edema     Annotation - [id number]: face and hands redness and swelling       Physical Exam:   Vitals:    03/18/20 1419   BP: 168/75   Pulse: 79   Resp: 20   Temp:    SpO2: 98%     General: Awake, Alert, Oriented x 3, Mood and affect appropriate  Respiratory: Respirations even and unlabored  Cardiovascular: Peripheral pulses intact; no edema  Musculoskeletal Exam:  Bilateral lumbar paraspinals tender to palpation  ASA Score: 3    Patient/Chart Verification  Patient ID Verified: Verbal  ID Band Applied: No  Consents Confirmed: Procedural  H&P( within 30 days) Verified:  To be obtained in the Pre-Procedure area  Interval H&P(within 24 hr) Complete (required for Outpatients and Surgery Admit only): To be obtained in the Pre-Procedure area  Allergies Reviewed: Yes  Anticoag/NSAID held?: NA  Currently on antibiotics?: No  Pre-op Lab/Test Results Available: N/A    Assessment:   1   Lumbar spondylosis        Plan: RT L3, L4, L5 RFA

## 2020-03-18 NOTE — DISCHARGE INSTRUCTIONS

## 2020-03-19 NOTE — TELEPHONE ENCOUNTER
CAMILLA patient, states he is doing well  Denies any fever >100, sun-burning sensation, redness, drainage or headaches  Patient is scheduled on 4/1/2020 for Left L3-L5 RFA with TERA

## 2020-04-23 ENCOUNTER — TELEMEDICINE (OUTPATIENT)
Dept: PAIN MEDICINE | Facility: CLINIC | Age: 55
End: 2020-04-23
Payer: COMMERCIAL

## 2020-04-23 DIAGNOSIS — M96.3 KYPHOSIS, POSTLAMINECTOMY: ICD-10-CM

## 2020-04-23 DIAGNOSIS — M48.061 SPINAL STENOSIS OF LUMBAR REGION, UNSPECIFIED WHETHER NEUROGENIC CLAUDICATION PRESENT: ICD-10-CM

## 2020-04-23 DIAGNOSIS — M79.18 MYOFASCIAL PAIN: ICD-10-CM

## 2020-04-23 DIAGNOSIS — F11.20 UNCOMPLICATED OPIOID DEPENDENCE (HCC): ICD-10-CM

## 2020-04-23 DIAGNOSIS — M47.816 LUMBAR SPONDYLOSIS: ICD-10-CM

## 2020-04-23 DIAGNOSIS — M47.812 CERVICAL SPONDYLOSIS WITHOUT MYELOPATHY: ICD-10-CM

## 2020-04-23 DIAGNOSIS — G89.4 PAIN SYNDROME, CHRONIC: ICD-10-CM

## 2020-04-23 DIAGNOSIS — Z79.891 ENCOUNTER FOR LONG-TERM OPIATE ANALGESIC USE: ICD-10-CM

## 2020-04-23 DIAGNOSIS — M96.1 POST LAMINECTOMY SYNDROME: ICD-10-CM

## 2020-04-23 DIAGNOSIS — G89.4 CHRONIC PAIN SYNDROME: Primary | ICD-10-CM

## 2020-04-23 DIAGNOSIS — M54.16 LUMBAR RADICULOPATHY: ICD-10-CM

## 2020-04-23 PROCEDURE — 99214 OFFICE O/P EST MOD 30 MIN: CPT | Performed by: ANESTHESIOLOGY

## 2020-04-23 RX ORDER — HYDROCODONE BITARTRATE AND ACETAMINOPHEN 5; 325 MG/1; MG/1
1 TABLET ORAL 2 TIMES DAILY PRN
Qty: 50 TABLET | Refills: 0 | Status: SHIPPED | OUTPATIENT
Start: 2020-04-23 | End: 2020-07-21 | Stop reason: SDUPTHER

## 2020-05-18 ENCOUNTER — TELEPHONE (OUTPATIENT)
Dept: RADIOLOGY | Facility: CLINIC | Age: 55
End: 2020-05-18

## 2020-05-27 ENCOUNTER — TELEPHONE (OUTPATIENT)
Dept: RADIOLOGY | Facility: CLINIC | Age: 55
End: 2020-05-27

## 2020-05-27 ENCOUNTER — HOSPITAL ENCOUNTER (OUTPATIENT)
Dept: RADIOLOGY | Facility: CLINIC | Age: 55
Discharge: HOME | End: 2020-05-27
Attending: ANESTHESIOLOGY | Admitting: ANESTHESIOLOGY
Payer: COMMERCIAL

## 2020-05-27 VITALS
RESPIRATION RATE: 20 BRPM | TEMPERATURE: 99.1 F | HEART RATE: 85 BPM | OXYGEN SATURATION: 98 % | DIASTOLIC BLOOD PRESSURE: 88 MMHG | SYSTOLIC BLOOD PRESSURE: 180 MMHG

## 2020-05-27 DIAGNOSIS — M47.816 LUMBAR SPONDYLOSIS: ICD-10-CM

## 2020-05-27 PROCEDURE — 64636 DESTROY L/S FACET JNT ADDL: CPT | Performed by: ANESTHESIOLOGY

## 2020-05-27 PROCEDURE — 64635 DESTROY LUMB/SAC FACET JNT: CPT | Performed by: ANESTHESIOLOGY

## 2020-05-27 RX ORDER — BUPIVACAINE HYDROCHLORIDE 5 MG/ML
30 INJECTION, SOLUTION EPIDURAL; INTRACAUDAL ONCE
Status: COMPLETED | OUTPATIENT
Start: 2020-05-27 | End: 2020-05-27

## 2020-05-27 RX ORDER — LIDOCAINE HYDROCHLORIDE 10 MG/ML
10 INJECTION, SOLUTION EPIDURAL; INFILTRATION; INTRACAUDAL; PERINEURAL ONCE
Status: COMPLETED | OUTPATIENT
Start: 2020-05-27 | End: 2020-05-27

## 2020-05-27 RX ADMIN — LIDOCAINE HYDROCHLORIDE 6 ML: 10 INJECTION, SOLUTION EPIDURAL; INFILTRATION; INTRACAUDAL; PERINEURAL at 14:21

## 2020-05-27 RX ADMIN — LIDOCAINE HYDROCHLORIDE 3 ML: 20 INJECTION, SOLUTION EPIDURAL; INFILTRATION; INTRACAUDAL; PERINEURAL at 14:25

## 2020-05-27 RX ADMIN — BUPIVACAINE HYDROCHLORIDE 3 ML: 5 INJECTION, SOLUTION EPIDURAL; INTRACAUDAL at 14:29

## 2020-07-21 ENCOUNTER — OFFICE VISIT (OUTPATIENT)
Dept: PAIN MEDICINE | Facility: CLINIC | Age: 55
End: 2020-07-21
Payer: COMMERCIAL

## 2020-07-21 VITALS
HEART RATE: 88 BPM | DIASTOLIC BLOOD PRESSURE: 93 MMHG | WEIGHT: 182 LBS | TEMPERATURE: 98.5 F | HEIGHT: 69 IN | SYSTOLIC BLOOD PRESSURE: 154 MMHG | BODY MASS INDEX: 26.96 KG/M2

## 2020-07-21 DIAGNOSIS — G89.4 CHRONIC PAIN SYNDROME: Primary | ICD-10-CM

## 2020-07-21 DIAGNOSIS — M54.16 LUMBAR RADICULOPATHY: ICD-10-CM

## 2020-07-21 DIAGNOSIS — M51.36 DEGENERATIVE DISC DISEASE, LUMBAR: ICD-10-CM

## 2020-07-21 DIAGNOSIS — G89.4 PAIN SYNDROME, CHRONIC: ICD-10-CM

## 2020-07-21 DIAGNOSIS — M96.1 POST LAMINECTOMY SYNDROME: ICD-10-CM

## 2020-07-21 DIAGNOSIS — F11.20 UNCOMPLICATED OPIOID DEPENDENCE (HCC): ICD-10-CM

## 2020-07-21 DIAGNOSIS — M48.061 SPINAL STENOSIS OF LUMBAR REGION, UNSPECIFIED WHETHER NEUROGENIC CLAUDICATION PRESENT: ICD-10-CM

## 2020-07-21 DIAGNOSIS — M47.816 LUMBAR SPONDYLOSIS: ICD-10-CM

## 2020-07-21 DIAGNOSIS — M79.18 MYOFASCIAL PAIN: ICD-10-CM

## 2020-07-21 DIAGNOSIS — Z79.891 ENCOUNTER FOR LONG-TERM OPIATE ANALGESIC USE: ICD-10-CM

## 2020-07-21 DIAGNOSIS — G50.0 TRIGEMINAL NEURALGIA: ICD-10-CM

## 2020-07-21 DIAGNOSIS — Z79.891 LONG-TERM CURRENT USE OF OPIATE ANALGESIC: ICD-10-CM

## 2020-07-21 PROCEDURE — 99214 OFFICE O/P EST MOD 30 MIN: CPT | Performed by: NURSE PRACTITIONER

## 2020-07-21 PROCEDURE — 80305 DRUG TEST PRSMV DIR OPT OBS: CPT | Performed by: NURSE PRACTITIONER

## 2020-07-21 RX ORDER — LAMOTRIGINE 100 MG/1
100 TABLET, ORALLY DISINTEGRATING ORAL DAILY
COMMUNITY
Start: 2020-05-19

## 2020-07-21 RX ORDER — BACLOFEN 10 MG/1
TABLET ORAL
Qty: 60 TABLET | Refills: 2 | Status: SHIPPED | OUTPATIENT
Start: 2020-07-21 | End: 2020-09-15 | Stop reason: SDUPTHER

## 2020-07-21 RX ORDER — HYDROCODONE BITARTRATE AND ACETAMINOPHEN 5; 325 MG/1; MG/1
1 TABLET ORAL 2 TIMES DAILY PRN
Qty: 50 TABLET | Refills: 0 | Status: SHIPPED | OUTPATIENT
Start: 2020-07-21 | End: 2020-07-21 | Stop reason: SDUPTHER

## 2020-07-21 RX ORDER — HYDROCODONE BITARTRATE AND ACETAMINOPHEN 5; 325 MG/1; MG/1
1 TABLET ORAL 2 TIMES DAILY PRN
Qty: 50 TABLET | Refills: 0 | Status: SHIPPED | OUTPATIENT
Start: 2020-08-19 | End: 2020-09-15 | Stop reason: SDUPTHER

## 2020-07-21 NOTE — PROGRESS NOTES
Assessment:  1  Chronic pain syndrome    2  Uncomplicated opioid dependence ([facility] [address] )    3  Long-term current use of opiate analgesic    4  Myofascial pain    5  Lumbar radiculopathy    6  Trigeminal neuralgia    7  Degenerative disc disease, lumbar    8  Lumbar spondylosis    9  Pain syndrome, chronic    10  Post laminectomy syndrome    11  Encounter for long-term opiate analgesic use    12  Spinal stenosis of lumbar region, unspecified whether neurogenic claudication present        Plan:  1  Patient has been trying to wean down on his opioid medications on his own  He states he is taking Nucynta  mg daily and Norco 5/325 mg twice a day as needed for breakthrough pain with a 60% improvement of his pain without side effects  I will refill both of these medications today with do not fill dates of today and August 19, 2020  While the patient was in the office today an opioid contract was thoroughly reviewed and signed by the patient  The patient was given adequate time to ask questions in regards to the contract and a signed copy was sent home for their records  [place] Prescription Drug Monitoring Program report was reviewed and was appropriate     A urine drug screen was collected at today's office visit as part of our medication management protocol  The point of care testing results were appropriate for what was being prescribed  The specimen will be sent for confirmatory testing  The drug screen is medically necessary because the patient is either dependent on opioid medication or is being considered for opioid medication therapy and the results could impact ongoing or future treatment  The drug screen is to evaluate for the presences or absence of prescribed, non-prescribed, and/or illicit drugs/substances  There are risks associated with opioid medications, including dependence, addiction and tolerance  The patient understands and agrees to use these medications only as prescribed   Potential side effects of the medications include, but are not limited to, constipation, drowsiness, addiction, impaired judgment and risk of fatal overdose if not taken as prescribed  The patient was warned against driving while taking sedation medications  Sharing medications is a felony  At this point in time, the patient is showing no signs of addiction, abuse, diversion or suicidal ideation  2  The patient states he has had bilateral trigeminal nerve ablation with Dr Coni Elise in the past   He is interested in repeating this at this time  I will reach out to Neurosurgery for further guidance and call the patient once I hear back  3  I encouraged the patient to reach out to [employer] for interrogation of his spinal cord stimulator device  4  The patient may continue baclofen 10 mg twice a day as needed for myofascial pain  This medication was refilled today  5  The patient may continue gabapentin as prescribed by Neurology  6  The patient will continue with his home exercise program   7  The patient will follow-up in 8 weeks for medication prescription refill and reevaluation  The patient was advised to contact the office should their symptoms worsen in the interim  The patient was agreeable and verbalized an understanding  M*Modal software was used to dictate this note  It may contain errors with dictating incorrect words or incorrect spelling  Please contact the provider directly with any questions  History of Present Illness: The patient is a 47 y o  male with a history of T8-12 decompression and fusion status post dorsal column and peripheral stimulator placement last seen on 4/23/20 who presents for a follow up office visit in regards to chronic lumbosacral back pain secondary to chronic pain syndrome, lumbar post laminectomy syndrome, lumbar degenerative disc disease, spondylosis, and stenosis  The patient denies bowel or bladder incontinence or saddle anesthesia    The patient is status post bilateral L4-5 and L5-S1 facet joint RFA completed on May 27, 2020  Per the patient, the pain has improved mildly, left greater than right  He has not had much relief with lumbar epidural steroid injections in the past   He does have a [employer] spinal cord stimulator, however has not had the stimulator reprogrammed in quite some time and feels like the battery may be dead  Patient states that he has been trying to wean down on his opioid medication  He is currently taking Nucynta  mg daily and Norco 5/325 mg 1 tablet 1 to 2 times a day as needed for breakthrough pain, Gabapentin 1400 mg twice a day as prescribed by Neurology, and baclofen 10 mg twice a day as needed with a 60% improvement of his pain without side effects  He does state that he is experiencing a lot of trigeminal pain in that he has had a trigeminal ablation in the past with Neurosurgery  He is interested in repeating this at this time  Patient currently rates his pain as 7/10 on the numeric pain rating scale  States the pain is constant nature and bothersome in the morning in the evening  He characterizes the pain as burning, dull aching, sharp, throbbing, pressure-like, shooting, numbness and pins and needles  Pain Contract Signed: 7/21/20  Last Urine Drug Screen: 7/21/20  Last taken Nucynta and Martin 7/20/20 per pt  I have personally reviewed and/or updated the patient's past medical history, past surgical history, family history, social history, current medications, allergies, and vital signs today  Review of Systems:    Review of Systems   Respiratory: Negative for shortness of breath  Cardiovascular: Negative for chest pain  Gastrointestinal: Positive for constipation  Negative for diarrhea, nausea and vomiting  Musculoskeletal: Positive for gait problem  Negative for arthralgias, joint swelling and myalgias  Skin: Negative for rash  Neurological: Negative for dizziness, seizures and weakness     All other systems reviewed and are negative  No past medical history on file  No past surgical history on file  No family history on file      Social History     Occupational History    Not on file   Tobacco Use    Smoking status: Current Every Day Smoker     Types: Cigarettes    Smokeless tobacco: Never Used   Substance and Sexual Activity    Alcohol use: No    Drug use: No    Sexual activity: Not on file         Current Outpatient Medications:     atorvastatin (LIPITOR) 80 mg tablet, Take by mouth, Disp: , Rfl:     baclofen 10 mg tablet, Take 1 PO BID PRN myofascial pain, Disp: 60 tablet, Rfl: 2    CHANTIX STARTING MONTH ELÍAS 0 5 MG X 11 & 1 MG X 42 tablet, Take by mouth daily As directed, Disp: , Rfl: 0    diclofenac sodium (VOLTAREN) 1 %, Apply 1 application topically 4 (four) times a day, Disp: , Rfl:     gabapentin (NEURONTIN) 600 MG tablet, Take 1 tablet by mouth 2 (two) times a day, Disp: , Rfl:     gabapentin (NEURONTIN) 800 mg tablet, Take 1 tablet by mouth 2 (two) times a day, Disp: , Rfl:     [START ON 8/19/2020] HYDROcodone-acetaminophen (NORCO) 5-325 mg per tablet, Take 1 tablet by mouth 2 (two) times a day as needed for painMax Daily Amount: 2 tablets, Disp: 50 tablet, Rfl: 0    lamoTRIgine (LaMICtal) 100 MG TBDP, Take 100 mg by mouth daily, Disp: , Rfl:     LamoTRIgine 25 (21)-50 (7) MG KIT, USE 1 KIT AS DIRECTED, Disp: , Rfl: 0    lidocaine (LIDODERM) 5 %, Apply 1 patch topically, Disp: , Rfl:     lisinopril-hydrochlorothiazide (PRINZIDE,ZESTORETIC) 20-12 5 MG per tablet, Take 1 tablet by mouth daily, Disp: , Rfl: 3    metFORMIN (GLUCOPHAGE) 1000 MG tablet, Take 1 tablet by mouth 2 (two) times a day, Disp: , Rfl:     OXcarbazepine (TRILEPTAL) 600 mg tablet, 2 TABLET(S) BY MOUTH TWO TIMES DAILY, Disp: , Rfl: 2    [START ON 8/19/2020] Tapentadol HCl ER (Nucynta ER) 100 MG TB12, Take 1 tablet (100 mg total) by mouth dailyMax Daily Amount: 100 mg, Disp: 30 tablet, Rfl: 0    Allergies   Allergen Reactions    Other     Sulfa Antibiotics Edema     Annotation - [id number]: face and hands redness and swelling       Physical Exam:    /93   Pulse 88   Temp 98 5 °F (36 9 °C)   Ht 5' 9" (1 753 m)   Wt 82 6 kg (182 lb)   BMI 26 88 kg/m²     Constitutional:normal, well developed, well nourished, alert, in no distress and non-toxic and no overt pain behavior    Eyes:anicteric  HEENT:grossly intact  Neck:supple, symmetric, trachea midline and no masses   Pulmonary:even and unlabored  Cardiovascular:No edema or pitting edema present  Skin:Normal without rashes or lesions and well hydrated  Psychiatric:Mood and affect appropriate  Neurologic:Cranial Nerves II-XII grossly intact  Musculoskeletal:normal gait      Imaging  No orders to display         Orders Placed This Encounter   Procedures    MM ALL_Prescribed Meds and Special Instructions    MM DT_Alprazolam Definitive Test    MM DT_Amphetamine Definitive Test    MM DT_Aripiprazole Definitive Test    MM DT_Bath Salts Definitive Test    MM DT_Buprenorphine Definitive Test    MM DT_Butalbital Definitive Test    MM DT_Clonazepam Definitive Test    MM DT_Clozapine Definitive Test    MM DT_Cocaine Definitive Test    MM DT_Codeine Definitive Test    MM DT_Desipramine Definitive Test    MM DT_Dextromethorphan Definitive Test    MM Diazepam Definitive Test    MM DT_Ethyl Glucuronide/Ethyl Sulfate Definitive Test    MM DT_Fentanyl Definitive Test    MM DT_Haloperidol Definitive Test    MM DT_Heroin Definitive Test    MM DT_Hydrocodone Definitive Test    MM DT_Hydromorphone Definitive Test    MM DT_Imipramine Definitive Test    MM DT_Kratom Definitive Test    MM DT_Levorphanol Definitive Test    MM Lorazepam Definitive Test    MM DT_MDMA Definitive Test    MM DT_Meperidine Definitive Test    MM DT_Methadone Definitive Test    MM DT_Methamphetamine Definitive Test    MM DT_Morphine Definitive Test    MM DT_Olanzapine Definitive Test    MM DT_Oxazepam Definitive Test    MM DT_Oxycodone Definitive Test    MM DT_Oxymorphone Definitive Test    MM DT_Phencyclidine Definitive Test    MM DT_Phenobarbital Definitive Test    MM DT_Phentermine Definitive Test    MM DT_Quetiapine Definitive Test    MM DT_Risperidone Definitive Test    MM DT_Secobarbital Definitive Test    MM DT_Spice Definitive Test    MM DT_Tapentadol Definitive Test    MM DT_Temazapam Definitive Test    MM DT_THC Definitive Test    MM DT_Tramadol Definitive Test    MM DT_Methylphenidate Definitive Test

## 2020-07-21 NOTE — PATIENT INSTRUCTIONS
Opioid Pain Management   AMBULATORY CARE:   An opioid  is a type of medicine used to treat pain  Examples of opioids are oxycodone, morphine, fentanyl, or codeine  Take opioid medicines as directed, for the condition it is prescribed:  Common problems that may occur when you do not take opioid medicines as directed include the following:  · Health problems  may occur  You may have trouble breathing  You may also develop liver or kidney damage, or stomach bleeding  Any of these health problems can become life-threatening  · Opioid dependence  means your body needs the opioid medicine to keep it from going through withdrawal      · Opioid tolerance  means the opioid does not control pain as well as it used to  You need higher doses of the opioid to get pain relief  · Opioid addiction  means you are not able to control the use of the opioid medicine  You use it when you do not have pain  You crave the opioid medicine  Call 911 or have someone call 911 for any of the following:   · You are breathing slower than normal, or you have trouble breathing  · You cannot be awakened  · You have a seizure  Seek care immediately if:   · Your heart is beating slower than usual     · Your heart feels like it is jumping or fluttering  · You are so sleepy that you cannot stay awake  · You have severe muscle pain or weakness  · You see or hear things that are not real   Contact your healthcare provider if:   · You are too dizzy to stand up  · Your pain gets worse or you have new pain  · You cannot do your usual activities because of side effects from the opioid  · You are constipated or have abdominal pain  · You have questions or concerns about your condition or care  Opioid safety measures:   · Take your medicine as directed  Ask if you need more information on how to take your medicine correctly  Follow up with your healthcare provider regularly  You may need to have your dose adjusted   Do not use opioid medicine if you are pregnant or breastfeeding  · Give your healthcare provider a list of all your medicines  Include any over-the-counter medicines, vitamins, and herbs  It can be dangerous to take opioids with certain other medicines, such as antihistamines  · Keep opioid medicine in a safe place  Store your opioid medicine in a locked cabinet to keep it away from children and others  · Do not drink alcohol while you use opioids  Alcohol use with an opioid medicine can make you sleepy and slow your breathing rate  You may stop breathing completely  · Do not drive or operate heavy machinery after you take opioid medicine  Opioid medicine can make you drowsy and make it hard to concentrate  You may injure yourself or others if you drive or operate heavy machinery while taking your medicine  · Drink fluids and eat high-fiber foods  Fluids and fiber will help prevent constipation  Ask your healthcare provider what fluids are right for you and how much you should drink  Also ask for a list of foods that contain fiber  Follow up with your healthcare provider as directed: You may need to have your dose adjusted  You may be referred to a pain specialist  Write down your questions so you remember to ask them during your visits  © 2017 [employer] Information is for End User's use only and may not be sold, redistributed or otherwise used for commercial purposes  All illustrations and images included in CareNotes® are the copyrighted property of [employer]  or Steven Rivera  The above information is an  only  It is not intended as medical advice for individual conditions or treatments  Talk to your doctor, nurse or pharmacist before following any medical regimen to see if it is safe and effective for you

## 2020-07-23 LAB
6MAM UR QL CFM: NEGATIVE NG/ML
7AMINOCLONAZEPAM UR QL CFM: NEGATIVE NG/ML
A-OH ALPRAZ UR QL CFM: NEGATIVE NG/ML
AMPHET UR QL CFM: NEGATIVE NG/ML
AMPHET UR QL CFM: NEGATIVE NG/ML
BUPRENORPHINE UR QL CFM: NEGATIVE NG/ML
BUTALBITAL UR QL CFM: NEGATIVE NG/ML
BZE UR QL CFM: NEGATIVE NG/ML
CODEINE UR QL CFM: NEGATIVE NG/ML
DESIPRAMINE UR QL CFM: NEGATIVE NG/ML
DESIPRAMINE UR QL CFM: NEGATIVE NG/ML
EDDP UR QL CFM: NEGATIVE NG/ML
ETHYL GLUCURONIDE UR QL CFM: NEGATIVE NG/ML
ETHYL SULFATE UR QL SCN: NEGATIVE NG/ML
FENTANYL UR QL CFM: NEGATIVE NG/ML
GLIADIN IGG SER IA-ACNC: NEGATIVE NG/ML
GLUCOSE 30M P 50 G LAC PO SERPL-MCNC: NEGATIVE NG/ML
HYDROCODONE UR QL CFM: ABNORMAL NG/ML
HYDROCODONE UR QL CFM: ABNORMAL NG/ML
HYDROMORPHONE UR QL CFM: ABNORMAL NG/ML
IMIPRAMINE UR QL CFM: NEGATIVE NG/ML
LORAZEPAM UR QL CFM: NEGATIVE NG/ML
MDMA UR QL CFM: NEGATIVE NG/ML
ME-PHENIDATE UR QL CFM: NEGATIVE NG/ML
MEPERIDINE UR QL CFM: NEGATIVE NG/ML
MEPHEDRONE UR QL CFM: NEGATIVE NG/ML
METHADONE UR QL CFM: NEGATIVE NG/ML
METHAMPHET UR QL CFM: NEGATIVE NG/ML
MORPHINE UR QL CFM: NEGATIVE NG/ML
MORPHINE UR QL CFM: NEGATIVE NG/ML
NORBUPRENORPHINE UR QL CFM: NEGATIVE NG/ML
NORDIAZEPAM UR QL CFM: NEGATIVE NG/ML
NORFENTANYL UR QL CFM: NEGATIVE NG/ML
NORHYDROCODONE UR QL CFM: ABNORMAL NG/ML
NORHYDROCODONE UR QL CFM: ABNORMAL NG/ML
NORMEPERIDINE UR QL CFM: NEGATIVE NG/ML
NOROXYCODONE UR QL CFM: NEGATIVE NG/ML
OLANZAPINE QUANTIFICATION: NEGATIVE NG/ML
OPC-3373 QUANTIFICATION: NEGATIVE
OXAZEPAM UR QL CFM: NEGATIVE NG/ML
OXYCODONE UR QL CFM: NEGATIVE NG/ML
OXYMORPHONE UR QL CFM: NEGATIVE NG/ML
OXYMORPHONE UR QL CFM: NEGATIVE NG/ML
PCP UR QL CFM: NEGATIVE NG/ML
PHENOBARB UR QL CFM: NEGATIVE NG/ML
SECOBARBITAL UR QL CFM: NEGATIVE NG/ML
SL AMB 3-METHYL-FENTANYL QUANTIFICATION: NORMAL NG/ML
SL AMB 4-ANPP QUANTIFICATION: NORMAL NG/ML
SL AMB 4-FIBF QUANTIFICATION: NORMAL NG/ML
SL AMB 5F-ADB-M7 METABOLITE QUANTIFICATION: NEGATIVE NG/ML
SL AMB 7-OH-MITRAGYNINE (KRATOM ALKALOID) QUANTIFICATION: NEGATIVE NG/ML
SL AMB AB-FUBINACA-M3 METABOLITE QUANTIFICATION: NEGATIVE NG/ML
SL AMB ACETYL FENTANYL QUANTIFICATION: NORMAL NG/ML
SL AMB ACETYL NORFENTANYL QUANTIFICATION: NORMAL NG/ML
SL AMB ACRYL FENTANYL QUANTIFICATION: NORMAL NG/ML
SL AMB BATH SALTS: NEGATIVE NG/ML
SL AMB BUTRYL FENTANYL QUANTIFICATION: NORMAL NG/ML
SL AMB CARFENTANIL QUANTIFICATION: NORMAL NG/ML
SL AMB CLOZAPINE QUANTIFICATION: NEGATIVE NG/ML
SL AMB CTHC (MARIJUANA METABOLITE) QUANTIFICATION: NEGATIVE NG/ML
SL AMB CYCLOPROPYL FENTANYL QUANTIFICATION: NORMAL NG/ML
SL AMB DEXTROMETHORPHAN QUANTIFICATION: NEGATIVE NG/ML
SL AMB DEXTRORPHAN (DEXTROMETHORPHAN METABOLITE) QUANT: NEGATIVE NG/ML
SL AMB DEXTRORPHAN (DEXTROMETHORPHAN METABOLITE) QUANT: NEGATIVE NG/ML
SL AMB FURANYL FENTANYL QUANTIFICATION: NORMAL NG/ML
SL AMB HALOPERIDOL  QUANTIFICATION: NEGATIVE NG/ML
SL AMB HALOPERIDOL METABOLITE QUANTIFICATION: NEGATIVE NG/ML
SL AMB HYDROXYRISPERIDONE QUANTIFICATION: NEGATIVE NG/ML
SL AMB JWH018 METABOLITE QUANTIFICATION: NEGATIVE NG/ML
SL AMB JWH073 METABOLITE QUANTIFICATION: NEGATIVE NG/ML
SL AMB MDMB-FUBINACA-M1 METABOLITE QUANTIFICATION: NEGATIVE NG/ML
SL AMB METHOXYACETYL FENTANYL QUANTIFICATION: NORMAL NG/ML
SL AMB METHYLONE QUANTIFICATION: NEGATIVE NG/ML
SL AMB N-DESMETHYL U-47700 QUANTIFICATION: NORMAL NG/ML
SL AMB N-DESMETHYL-TRAMADOL QUANTIFICATION: NEGATIVE NG/ML
SL AMB N-DESMETHYLCLOZAPINE QUANTIFICATION: NEGATIVE NG/ML
SL AMB NORQUETIAPINE QUANTIFICATION: NEGATIVE NG/ML
SL AMB PHENTERMINE QUANTIFICATION: NEGATIVE NG/ML
SL AMB QUETIAPINE QUANTIFICATION: NEGATIVE NG/ML
SL AMB RCS4 METABOLITE QUANTIFICATION: NEGATIVE NG/ML
SL AMB RISPERIDONE QUANTIFICATION: NEGATIVE NG/ML
SL AMB RITALINIC ACID QUANTIFICATION: NEGATIVE NG/ML
SL AMB U-47700 QUANTIFICATION: NORMAL NG/ML
SPECIMEN DRAWN SERPL: NEGATIVE NG/ML
TAPENTADOL UR CFM-MCNC: 2424.97 NG/ML
TEMAZEPAM UR QL CFM: NEGATIVE NG/ML
TEMAZEPAM UR QL CFM: NEGATIVE NG/ML
TRAMADOL UR QL CFM: NEGATIVE NG/ML
URATE/CREAT 24H UR: NEGATIVE NG/ML

## 2020-07-27 ENCOUNTER — TELEPHONE (OUTPATIENT)
Dept: PAIN MEDICINE | Facility: CLINIC | Age: 55
End: 2020-07-27

## 2020-07-27 NOTE — TELEPHONE ENCOUNTER
Would the patient like a referral to neurosurgery?    ----- Message from Arline Cancino MD sent at 7/22/2020  7:36 AM EDT -----  Sure send him my way  I do both RF ablation and glycerol ablation    Amelia Meigs  ----- Message -----  From: TONY Camacho  Sent: 7/21/2020   3:01 PM EDT  To: Arline Cancino MD    Hi Dr Trisha Moya,  This patient was seen by Dr Georgie Stark in the past per the patient for a bilateral trigeminal nerve ablation years ago  He is interested in having this repeated  We weren't sure if you did this and if not, if you had someone I could refer him to?     Thanks,  Suzette Delvalle

## 2020-07-27 NOTE — TELEPHONE ENCOUNTER
Left detailed MOM informing pt of below, asked pt to call back if he would like a referral to Dr Godfrey Quinn  Left c/b#

## 2020-07-29 NOTE — TELEPHONE ENCOUNTER
Attempted to call the patient and left a detailed mom in regards to the previous task   Instructed the patient to CB if he would like the referral

## 2020-09-15 ENCOUNTER — OFFICE VISIT (OUTPATIENT)
Dept: PAIN MEDICINE | Facility: CLINIC | Age: 55
End: 2020-09-15
Payer: COMMERCIAL

## 2020-09-15 VITALS
WEIGHT: 178 LBS | TEMPERATURE: 98.2 F | SYSTOLIC BLOOD PRESSURE: 156 MMHG | HEIGHT: 69 IN | DIASTOLIC BLOOD PRESSURE: 95 MMHG | BODY MASS INDEX: 26.36 KG/M2 | HEART RATE: 87 BPM

## 2020-09-15 DIAGNOSIS — M25.511 CHRONIC RIGHT SHOULDER PAIN: ICD-10-CM

## 2020-09-15 DIAGNOSIS — M48.061 SPINAL STENOSIS OF LUMBAR REGION, UNSPECIFIED WHETHER NEUROGENIC CLAUDICATION PRESENT: ICD-10-CM

## 2020-09-15 DIAGNOSIS — G89.29 CHRONIC RIGHT SHOULDER PAIN: ICD-10-CM

## 2020-09-15 DIAGNOSIS — M54.12 CERVICAL RADICULOPATHY: ICD-10-CM

## 2020-09-15 DIAGNOSIS — G62.9 NEUROPATHY: ICD-10-CM

## 2020-09-15 DIAGNOSIS — M47.812 CERVICAL SPONDYLOSIS WITHOUT MYELOPATHY: ICD-10-CM

## 2020-09-15 DIAGNOSIS — M47.14 OTHER SPONDYLOSIS WITH MYELOPATHY, THORACIC REGION: ICD-10-CM

## 2020-09-15 DIAGNOSIS — M79.18 MYOFASCIAL PAIN: ICD-10-CM

## 2020-09-15 DIAGNOSIS — G89.4 PAIN SYNDROME, CHRONIC: Primary | ICD-10-CM

## 2020-09-15 DIAGNOSIS — Z79.891 ENCOUNTER FOR LONG-TERM OPIATE ANALGESIC USE: ICD-10-CM

## 2020-09-15 DIAGNOSIS — M47.816 LUMBAR SPONDYLOSIS: ICD-10-CM

## 2020-09-15 DIAGNOSIS — M96.1 POST LAMINECTOMY SYNDROME: ICD-10-CM

## 2020-09-15 DIAGNOSIS — F11.20 UNCOMPLICATED OPIOID DEPENDENCE (HCC): ICD-10-CM

## 2020-09-15 DIAGNOSIS — G89.12 POST-THORACOTOMY PAIN: ICD-10-CM

## 2020-09-15 DIAGNOSIS — M54.16 LUMBAR RADICULOPATHY: ICD-10-CM

## 2020-09-15 DIAGNOSIS — M48.04 THORACIC SPINAL STENOSIS: ICD-10-CM

## 2020-09-15 PROCEDURE — 99214 OFFICE O/P EST MOD 30 MIN: CPT | Performed by: NURSE PRACTITIONER

## 2020-09-15 RX ORDER — HYDROCODONE BITARTRATE AND ACETAMINOPHEN 5; 325 MG/1; MG/1
1 TABLET ORAL 2 TIMES DAILY PRN
Qty: 50 TABLET | Refills: 0 | Status: SHIPPED | OUTPATIENT
Start: 2020-09-23 | End: 2020-10-20 | Stop reason: SDUPTHER

## 2020-09-15 RX ORDER — BACLOFEN 10 MG/1
TABLET ORAL
Qty: 60 TABLET | Refills: 2 | Status: SHIPPED | OUTPATIENT
Start: 2020-09-15 | End: 2020-10-20

## 2020-09-15 RX ORDER — TAPENTADOL HYDROCHLORIDE 100 MG/1
100 TABLET, FILM COATED, EXTENDED RELEASE ORAL DAILY
Qty: 30 TABLET | Refills: 0 | Status: SHIPPED | OUTPATIENT
Start: 2020-10-10 | End: 2020-10-20 | Stop reason: SDUPTHER

## 2020-09-15 NOTE — PROGRESS NOTES
Assessment:  1  Pain syndrome, chronic    2  Post laminectomy syndrome    3  Cervical radiculopathy    4  Lumbar radiculopathy    5  Other spondylosis with myelopathy, thoracic region    6  Neuropathy    7  Lumbar spondylosis    8  Cervical spondylosis without myelopathy    9  Uncomplicated opioid dependence ([address] )    10  Post-thoracotomy pain    11  Thoracic spinal stenosis    12  Encounter for long-term opiate analgesic use    13  Spinal stenosis of lumbar region, unspecified whether neurogenic claudication present    14  Myofascial pain    15  Chronic right shoulder pain        Plan:  1  Patient may continue Nucynta  milligrams daily for pain as prescribed #30 tablets for 30 days  The patient was given a 1 month supply of prescriptions with a Do Not Fill date(s) of 10/10/20  We may consider weaning down to 50mg next office visit which he is agreeable to  Patient may also continue Norco 5/325 milligrams 1 tablet twice a day as needed for breakthrough pain #60 tablets for 30 days  The patient was given a 1 month supply of prescriptions with a Do Not Fill date(s) of September 23, 2020    [facility] report was reviewed and was appropriate     There are risks associated with opioid medications, including dependence, addiction and tolerance  The patient understands and agrees to use these medications only as prescribed  Potential side effects of the medications include, but are not limited to, constipation, drowsiness, addiction, impaired judgment and risk of fatal overdose if not taken as prescribed  The patient was warned against driving while taking sedation medications  Sharing medications is a felony  At this point in time, the patient is showing no signs of addiction, abuse, diversion or suicidal ideation  2  I will schedule the patient for a right L3 and L4 TFESI to address the inflammatory component the patient's pain   He hasn't had much relief with PIETRO in the past, however this is new onset weakness so he would like to try to see if this improves his symptoms  3  I will order an x-ray of the right shoulder  4  Patient will follow with Neurosurgery for bilateral trigeminal nerve ablation when he is able to move forward with this  Right now, he states he is focused on his mother's health  5  The patient will reach out to [employer] regarding spinal cord stimulator   6  We can repeat left acromioclavicular joint joint injection p r n   7  The patient may continue gabapentin as prescribed by Neurology  8  The patient will continue with her home exercise program  9  The patient will follow-up 4 weeks after the procedure or sooner if needed  M*Modal software was used to dictate this note  It may contain errors with dictating incorrect words or incorrect spelling  Please contact the provider directly with any questions  History of Present Illness: The patient is a 47 y o  male with a history of T12 decompression and fusion with instrumentation status post dorsal column and peripheral stimulator placement last seen on 7/21/20 who presents for a follow up office visit in regards to chronic lumbosacral back pain, right facial pain, and new onset weakness of the right lower extremity secondary to lumbar post-laminectomy syndrome, chronic pain syndrome, lumbar degenerative disc disease, spondylosis, stenosis, osteoarthritis and myofascial pain syndrome  The patient denies bowel or bladder incontinence or saddle anesthesia  The patient is status post bilateral L4-5 and L5-S1 facet joint RFA in May 2020 which only mildly improved his left low back pain  He has not had much relief with lumbar epidural steroid injections in the past for his low back pain  He also complains of new subjective right lower extremity weakness  He denies any pain in the right lower extremity  He is also complaining of right shoulder pain    He has had acromioclavicular joint injections on the left with Dr Kellie Bliss which significantly improved his left shoulder pain, however I do not have any imaging of the right shoulder to review  He does have a [employer] spinal cord stimulator, however he states the battery is dead  He plans to have this replaced, however right now he is caring for his mother who with sick and states she has a priority  He is also planning on having a trigeminal ablation with Neurosurgery once his mother's health has improved as well  Since last office visit, he has been continuing to self wean on his opioid medication  He is taking Nucynta  milligrams daily and Norco 5/325 milligrams 1 2 times a day as needed for breakthrough pain  He is also prescribed gabapentin 1400 milligrams twice a day and baclofen 10 milligrams twice a day as needed by Neurology and reports a 60 percent improvement of his pain without side effects  The patient rates the pain a 5/10 on the numeric pain rating scale  He states his pain is constant nature and bothersome in the morning and at night  He characterizes the pain as burning, dull aching, sharp, throbbing, cramping, pressure like, shooting, numbness and pins and needles  Pain Contract Signed: 7/21/20  Last Urine Drug Screen: 7/21/20  Last Norco taken per pt  9/15/20  Last nucynta taken per pt  9/15/20    I have personally reviewed and/or updated the patient's past medical history, past surgical history, family history, social history, current medications, allergies, and vital signs today  Review of Systems:    Review of Systems   Constitutional: Negative for fever and unexpected weight change  HENT: Negative for trouble swallowing  Eyes: Negative for visual disturbance  Respiratory: Negative for shortness of breath and wheezing  Cardiovascular: Negative for chest pain and palpitations  Gastrointestinal: Positive for constipation  Negative for diarrhea, nausea and vomiting     Endocrine: Negative for cold intolerance, heat intolerance and polydipsia  Genitourinary: Negative for difficulty urinating and frequency  Musculoskeletal: Positive for gait problem  Negative for arthralgias, joint swelling and myalgias  Skin: Negative for rash  Neurological: Positive for weakness  Negative for dizziness, seizures, syncope and headaches  Hematological: Does not bruise/bleed easily  Psychiatric/Behavioral: Negative for dysphoric mood  All other systems reviewed and are negative  No past medical history on file  No past surgical history on file  No family history on file      Social History     Occupational History    Not on file   Tobacco Use    Smoking status: Current Every Day Smoker     Types: Cigarettes    Smokeless tobacco: Never Used   Substance and Sexual Activity    Alcohol use: No    Drug use: No    Sexual activity: Not on file         Current Outpatient Medications:     atorvastatin (LIPITOR) 80 mg tablet, Take by mouth, Disp: , Rfl:     baclofen 10 mg tablet, Take 1 PO BID PRN myofascial pain, Disp: 60 tablet, Rfl: 2    CHANTIX STARTING MONTH ELÍAS 0 5 MG X 11 & 1 MG X 42 tablet, Take by mouth daily As directed, Disp: , Rfl: 0    diclofenac sodium (VOLTAREN) 1 %, Apply 1 application topically 4 (four) times a day, Disp: , Rfl:     gabapentin (NEURONTIN) 600 MG tablet, Take 1 tablet by mouth 2 (two) times a day, Disp: , Rfl:     gabapentin (NEURONTIN) 800 mg tablet, Take 1 tablet by mouth 2 (two) times a day, Disp: , Rfl:     lamoTRIgine (LaMICtal) 100 MG TBDP, Take 100 mg by mouth daily, Disp: , Rfl:     LamoTRIgine 25 (21)-50 (7) MG KIT, USE 1 KIT AS DIRECTED, Disp: , Rfl: 0    lidocaine (LIDODERM) 5 %, Apply 1 patch topically, Disp: , Rfl:     lisinopril-hydrochlorothiazide (PRINZIDE,ZESTORETIC) 20-12 5 MG per tablet, Take 1 tablet by mouth daily, Disp: , Rfl: 3    metFORMIN (GLUCOPHAGE) 1000 MG tablet, Take 1 tablet by mouth 2 (two) times a day, Disp: , Rfl:    OXcarbazepine (TRILEPTAL) 600 mg tablet, 2 TABLET(S) BY MOUTH TWO TIMES DAILY, Disp: , Rfl: 2    [START ON 9/23/2020] HYDROcodone-acetaminophen (NORCO) 5-325 mg per tablet, Take 1 tablet by mouth 2 (two) times a day as needed for painMax Daily Amount: 2 tablets, Disp: 50 tablet, Rfl: 0    [START ON 10/10/2020] Tapentadol HCl ER (Nucynta ER) 100 MG TB12, Take 1 tablet (100 mg total) by mouth dailyMax Daily Amount: 100 mg, Disp: 30 tablet, Rfl: 0    Allergies   Allergen Reactions    Other     Sulfa Antibiotics Edema     Annotation - [id number]: face and hands redness and swelling       Physical Exam:    /95   Pulse 87   Temp 98 2 °F (36 8 °C)   Ht 5' 9" (1 753 m)   Wt 80 7 kg (178 lb)   BMI 26 29 kg/m²     Constitutional:normal, well developed, well nourished, alert, in no distress and non-toxic and no overt pain behavior  Eyes:anicteric  HEENT:grossly intact  Neck:supple, symmetric, trachea midline and no masses   Pulmonary:even and unlabored  Cardiovascular:No edema or pitting edema present  Skin:Normal without rashes or lesions and well hydrated  Psychiatric:Mood and affect appropriate  Neurologic:Cranial Nerves II-XII grossly intact  Musculoskeletal:normal gait  Anterior aspect of the right shoulder tender to palpation  Negative seated straight leg raise bilaterally      Imaging  XR shoulder 2+ vw right    (Results Pending)   FL spine and pain procedure    (Results Pending)     CT LUMBAR SPINE     INDICATION:   G95 20: Unspecified cord compression  M54 5: Low back pain  M54 12: Radiculopathy, cervical region      COMPARISON: 5/5/2015 CT     TECHNIQUE:  Contiguous axial images through the lumbar spine were obtained  Sagittal and coronal reconstructions were performed        Radiation dose length product (DLP) for this visit:  1086 mGy-cm     This examination, like all CT scans performed in the [facility], was performed utilizing techniques to minimize radiation dose exposure, including the use of iterative   reconstruction and automated exposure control        IMAGE QUALITY:  Diagnostic      FINDINGS:     ALIGNMENT:  Normal alignment of the lumbar spine  No spondylolisthesis or spondylolysis      VERTEBRAL BODIES:  No fracture  No lytic or blastic lesion      DEGENERATIVE CHANGES:     Lower Thoracic spine:  Normal lower thoracic disc spaces  ]     L1-2:  Normal disc height  No herniation  Normal facet joints  No canal or foraminal stenosis      L2-3:  Normal disc height  No herniation  Normal facet joints    No canal or foraminal stenosis      L3-4: Progressive mild-to-moderate degenerative spondylosis, mild central canal and bilateral foraminal stenosis     L4-5:  Progressive mild to moderate degenerative spondylosis, mild central canal and bilateral foraminal stenosis, new small broad left-sided disc protrusion, possible left L5 nerve root encroachment      L5-S1:  Progressive moderate degenerative spondylosis, moderate to severe left foraminal stenosis, possible left L5 nerve root encroachment      PARASPINAL SOFT TISSUES:   Normal      IMPRESSION:     Progressive multilevel degenerative spondylosis     Progressive mild central canal and bilateral foraminal stenosis L3-4     Progressive mild central canal and bilateral foraminal stenosis, new small left-sided disc protrusion L4-5     Progressive moderate to severe left foraminal stenosis L5-S1       Orders Placed This Encounter   Procedures    XR shoulder 2+ vw right    FL spine and pain procedure

## 2020-09-15 NOTE — PATIENT INSTRUCTIONS
Opioid Pain Management   AMBULATORY CARE:   An opioid  is a type of medicine used to treat pain  Examples of opioids are oxycodone, morphine, fentanyl, or codeine  Take opioid medicines as directed, for the condition it is prescribed:  Common problems that may occur when you do not take opioid medicines as directed include the following:  · Health problems  may occur  You may have trouble breathing  You may also develop liver or kidney damage, or stomach bleeding  Any of these health problems can become life-threatening  · Opioid dependence  means your body needs the opioid medicine to keep it from going through withdrawal      · Opioid tolerance  means the opioid does not control pain as well as it used to  You need higher doses of the opioid to get pain relief  · Opioid addiction  means you are not able to control the use of the opioid medicine  You use it when you do not have pain  You crave the opioid medicine  Call 911 or have someone call 911 for any of the following:   · You are breathing slower than normal, or you have trouble breathing  · You cannot be awakened  · You have a seizure  Seek care immediately if:   · Your heart is beating slower than usual     · Your heart feels like it is jumping or fluttering  · You are so sleepy that you cannot stay awake  · You have severe muscle pain or weakness  · You see or hear things that are not real   Contact your healthcare provider if:   · You are too dizzy to stand up  · Your pain gets worse or you have new pain  · You cannot do your usual activities because of side effects from the opioid  · You are constipated or have abdominal pain  · You have questions or concerns about your condition or care  Opioid safety measures:   · Take your medicine as directed  Ask if you need more information on how to take your medicine correctly  Follow up with your healthcare provider regularly  You may need to have your dose adjusted   Do not use opioid medicine if you are pregnant or breastfeeding  · Give your healthcare provider a list of all your medicines  Include any over-the-counter medicines, vitamins, and herbs  It can be dangerous to take opioids with certain other medicines, such as antihistamines  · Keep opioid medicine in a safe place  Store your opioid medicine in a locked cabinet to keep it away from children and others  · Do not drink alcohol while you use opioids  Alcohol use with an opioid medicine can make you sleepy and slow your breathing rate  You may stop breathing completely  · Do not drive or operate heavy machinery after you take opioid medicine  Opioid medicine can make you drowsy and make it hard to concentrate  You may injure yourself or others if you drive or operate heavy machinery while taking your medicine  · Drink fluids and eat high-fiber foods  Fluids and fiber will help prevent constipation  Ask your healthcare provider what fluids are right for you and how much you should drink  Also ask for a list of foods that contain fiber  Follow up with your healthcare provider as directed: You may need to have your dose adjusted  You may be referred to a pain specialist  Write down your questions so you remember to ask them during your visits  © 2017 [employer] Information is for End User's use only and may not be sold, redistributed or otherwise used for commercial purposes  All illustrations and images included in CareNotes® are the copyrighted property of [employer]  or Steven Rivera  The above information is an  only  It is not intended as medical advice for individual conditions or treatments  Talk to your doctor, nurse or pharmacist before following any medical regimen to see if it is safe and effective for you

## 2020-10-12 ENCOUNTER — HOSPITAL ENCOUNTER (OUTPATIENT)
Dept: RADIOLOGY | Facility: CLINIC | Age: 55
Discharge: HOME | End: 2020-10-12
Attending: ANESTHESIOLOGY
Payer: COMMERCIAL

## 2020-10-12 VITALS
RESPIRATION RATE: 20 BRPM | SYSTOLIC BLOOD PRESSURE: 144 MMHG | TEMPERATURE: 98 F | OXYGEN SATURATION: 96 % | DIASTOLIC BLOOD PRESSURE: 72 MMHG | HEART RATE: 79 BPM

## 2020-10-12 DIAGNOSIS — M54.16 LUMBAR RADICULOPATHY: ICD-10-CM

## 2020-10-12 PROCEDURE — 64483 NJX AA&/STRD TFRM EPI L/S 1: CPT | Performed by: ANESTHESIOLOGY

## 2020-10-12 PROCEDURE — 64484 NJX AA&/STRD TFRM EPI L/S EA: CPT | Performed by: ANESTHESIOLOGY

## 2020-10-12 RX ORDER — PAPAVERINE HCL 150 MG
20 CAPSULE, EXTENDED RELEASE ORAL ONCE
Status: COMPLETED | OUTPATIENT
Start: 2020-10-12 | End: 2020-10-12

## 2020-10-12 RX ORDER — LIDOCAINE HYDROCHLORIDE 10 MG/ML
5 INJECTION, SOLUTION EPIDURAL; INFILTRATION; INTRACAUDAL; PERINEURAL ONCE
Status: COMPLETED | OUTPATIENT
Start: 2020-10-12 | End: 2020-10-12

## 2020-10-12 RX ADMIN — LIDOCAINE HYDROCHLORIDE 4 ML: 10 INJECTION, SOLUTION EPIDURAL; INFILTRATION; INTRACAUDAL; PERINEURAL at 14:41

## 2020-10-12 RX ADMIN — DEXAMETHASONE SODIUM PHOSPHATE 15 MG: 10 INJECTION, SOLUTION INTRAMUSCULAR; INTRAVENOUS at 14:45

## 2020-10-12 RX ADMIN — IOHEXOL 2 ML: 300 INJECTION, SOLUTION INTRAVENOUS at 14:43

## 2020-10-12 RX ADMIN — LIDOCAINE HYDROCHLORIDE 2 ML: 20 INJECTION, SOLUTION EPIDURAL; INFILTRATION; INTRACAUDAL; PERINEURAL at 14:44

## 2020-10-19 ENCOUNTER — TELEPHONE (OUTPATIENT)
Dept: PAIN MEDICINE | Facility: CLINIC | Age: 55
End: 2020-10-19

## 2020-10-19 ENCOUNTER — APPOINTMENT (OUTPATIENT)
Dept: RADIOLOGY | Age: 55
End: 2020-10-19
Payer: COMMERCIAL

## 2020-10-19 DIAGNOSIS — M25.511 CHRONIC RIGHT SHOULDER PAIN: ICD-10-CM

## 2020-10-19 DIAGNOSIS — G89.29 CHRONIC RIGHT SHOULDER PAIN: ICD-10-CM

## 2020-10-19 PROCEDURE — 73030 X-RAY EXAM OF SHOULDER: CPT

## 2020-10-20 ENCOUNTER — OFFICE VISIT (OUTPATIENT)
Dept: PAIN MEDICINE | Facility: CLINIC | Age: 55
End: 2020-10-20
Payer: COMMERCIAL

## 2020-10-20 VITALS
BODY MASS INDEX: 26.36 KG/M2 | HEIGHT: 69 IN | DIASTOLIC BLOOD PRESSURE: 95 MMHG | TEMPERATURE: 98.5 F | SYSTOLIC BLOOD PRESSURE: 147 MMHG | WEIGHT: 178 LBS | HEART RATE: 86 BPM

## 2020-10-20 DIAGNOSIS — M47.14 OTHER SPONDYLOSIS WITH MYELOPATHY, THORACIC REGION: ICD-10-CM

## 2020-10-20 DIAGNOSIS — F11.20 UNCOMPLICATED OPIOID DEPENDENCE (HCC): ICD-10-CM

## 2020-10-20 DIAGNOSIS — Z79.891 ENCOUNTER FOR LONG-TERM OPIATE ANALGESIC USE: ICD-10-CM

## 2020-10-20 DIAGNOSIS — M47.812 CERVICAL SPONDYLOSIS WITHOUT MYELOPATHY: ICD-10-CM

## 2020-10-20 DIAGNOSIS — M48.061 SPINAL STENOSIS OF LUMBAR REGION, UNSPECIFIED WHETHER NEUROGENIC CLAUDICATION PRESENT: ICD-10-CM

## 2020-10-20 DIAGNOSIS — M51.36 DEGENERATIVE DISC DISEASE, LUMBAR: ICD-10-CM

## 2020-10-20 DIAGNOSIS — M54.12 CERVICAL RADICULOPATHY: ICD-10-CM

## 2020-10-20 DIAGNOSIS — M47.816 LUMBAR SPONDYLOSIS: ICD-10-CM

## 2020-10-20 DIAGNOSIS — M54.16 LUMBAR RADICULOPATHY: ICD-10-CM

## 2020-10-20 DIAGNOSIS — M48.04 THORACIC SPINAL STENOSIS: ICD-10-CM

## 2020-10-20 DIAGNOSIS — G89.4 PAIN SYNDROME, CHRONIC: Primary | ICD-10-CM

## 2020-10-20 DIAGNOSIS — M96.1 POST LAMINECTOMY SYNDROME: ICD-10-CM

## 2020-10-20 DIAGNOSIS — M79.18 MYOFASCIAL PAIN SYNDROME: ICD-10-CM

## 2020-10-20 DIAGNOSIS — G89.4 CHRONIC PAIN SYNDROME: ICD-10-CM

## 2020-10-20 PROCEDURE — 80305 DRUG TEST PRSMV DIR OPT OBS: CPT | Performed by: NURSE PRACTITIONER

## 2020-10-20 PROCEDURE — 99214 OFFICE O/P EST MOD 30 MIN: CPT | Performed by: NURSE PRACTITIONER

## 2020-10-20 RX ORDER — HYDROCODONE BITARTRATE AND ACETAMINOPHEN 5; 325 MG/1; MG/1
1 TABLET ORAL 2 TIMES DAILY PRN
Qty: 50 TABLET | Refills: 0 | Status: SHIPPED | OUTPATIENT
Start: 2020-11-27 | End: 2020-12-15 | Stop reason: SDUPTHER

## 2020-10-20 RX ORDER — HYDROCODONE BITARTRATE AND ACETAMINOPHEN 5; 325 MG/1; MG/1
1 TABLET ORAL 2 TIMES DAILY PRN
Qty: 50 TABLET | Refills: 0 | Status: SHIPPED | OUTPATIENT
Start: 2020-10-29 | End: 2020-10-20 | Stop reason: SDUPTHER

## 2020-10-20 RX ORDER — TIZANIDINE 4 MG/1
4 TABLET ORAL EVERY 8 HOURS PRN
Qty: 90 TABLET | Refills: 1 | Status: SHIPPED | OUTPATIENT
Start: 2020-10-20 | End: 2020-12-15 | Stop reason: SDUPTHER

## 2020-10-20 RX ORDER — TAPENTADOL HYDROCHLORIDE 100 MG/1
100 TABLET, FILM COATED, EXTENDED RELEASE ORAL DAILY
Qty: 30 TABLET | Refills: 0 | Status: SHIPPED | OUTPATIENT
Start: 2020-11-18 | End: 2020-12-15 | Stop reason: SDUPTHER

## 2020-10-22 LAB
6MAM UR QL CFM: NEGATIVE NG/ML
7AMINOCLONAZEPAM UR QL CFM: NEGATIVE NG/ML
A-OH ALPRAZ UR QL CFM: NEGATIVE NG/ML
AMPHET UR QL CFM: NEGATIVE NG/ML
AMPHET UR QL CFM: NEGATIVE NG/ML
BUPRENORPHINE UR QL CFM: NEGATIVE NG/ML
BUTALBITAL UR QL CFM: NEGATIVE NG/ML
BZE UR QL CFM: NEGATIVE NG/ML
CODEINE UR QL CFM: NEGATIVE NG/ML
DESIPRAMINE UR QL CFM: NEGATIVE NG/ML
DESIPRAMINE UR QL CFM: NEGATIVE NG/ML
EDDP UR QL CFM: NEGATIVE NG/ML
ETHYL GLUCURONIDE UR QL CFM: NEGATIVE NG/ML
ETHYL SULFATE UR QL SCN: NEGATIVE NG/ML
FENTANYL UR QL CFM: NEGATIVE NG/ML
GLIADIN IGG SER IA-ACNC: NEGATIVE NG/ML
GLUCOSE 30M P 50 G LAC PO SERPL-MCNC: NEGATIVE NG/ML
HYDROCODONE UR QL CFM: NORMAL NG/ML
HYDROCODONE UR QL CFM: NORMAL NG/ML
HYDROMORPHONE UR QL CFM: NORMAL NG/ML
IMIPRAMINE UR QL CFM: NEGATIVE NG/ML
LORAZEPAM UR QL CFM: NEGATIVE NG/ML
MDMA UR QL CFM: NEGATIVE NG/ML
ME-PHENIDATE UR QL CFM: NEGATIVE NG/ML
MEPERIDINE UR QL CFM: NEGATIVE NG/ML
MEPHEDRONE UR QL CFM: NEGATIVE NG/ML
METHADONE UR QL CFM: NEGATIVE NG/ML
METHAMPHET UR QL CFM: NEGATIVE NG/ML
MORPHINE UR QL CFM: NEGATIVE NG/ML
MORPHINE UR QL CFM: NEGATIVE NG/ML
NORBUPRENORPHINE UR QL CFM: NEGATIVE NG/ML
NORDIAZEPAM UR QL CFM: NEGATIVE NG/ML
NORFENTANYL UR QL CFM: NEGATIVE NG/ML
NORHYDROCODONE UR QL CFM: NORMAL NG/ML
NORHYDROCODONE UR QL CFM: NORMAL NG/ML
NORMEPERIDINE UR QL CFM: NEGATIVE NG/ML
NOROXYCODONE UR QL CFM: NEGATIVE NG/ML
OLANZAPINE QUANTIFICATION: NEGATIVE NG/ML
OPC-3373 QUANTIFICATION: NEGATIVE
OXAZEPAM UR QL CFM: NEGATIVE NG/ML
OXYCODONE UR QL CFM: NEGATIVE NG/ML
OXYMORPHONE UR QL CFM: NEGATIVE NG/ML
OXYMORPHONE UR QL CFM: NEGATIVE NG/ML
PCP UR QL CFM: NEGATIVE NG/ML
PHENOBARB UR QL CFM: NEGATIVE NG/ML
SECOBARBITAL UR QL CFM: NEGATIVE NG/ML
SL AMB 3-METHYL-FENTANYL QUANTIFICATION: NORMAL NG/ML
SL AMB 4-ANPP QUANTIFICATION: NORMAL NG/ML
SL AMB 4-FIBF QUANTIFICATION: NORMAL NG/ML
SL AMB 5F-ADB-M7 METABOLITE QUANTIFICATION: NEGATIVE NG/ML
SL AMB 7-OH-MITRAGYNINE (KRATOM ALKALOID) QUANTIFICATION: NEGATIVE NG/ML
SL AMB AB-FUBINACA-M3 METABOLITE QUANTIFICATION: NEGATIVE NG/ML
SL AMB ACETYL FENTANYL QUANTIFICATION: NORMAL NG/ML
SL AMB ACETYL NORFENTANYL QUANTIFICATION: NORMAL NG/ML
SL AMB ACRYL FENTANYL QUANTIFICATION: NORMAL NG/ML
SL AMB BATH SALTS: NEGATIVE NG/ML
SL AMB BUTRYL FENTANYL QUANTIFICATION: NORMAL NG/ML
SL AMB CARFENTANIL QUANTIFICATION: NORMAL NG/ML
SL AMB CLOZAPINE QUANTIFICATION: NEGATIVE NG/ML
SL AMB CTHC (MARIJUANA METABOLITE) QUANTIFICATION: NEGATIVE NG/ML
SL AMB CYCLOPROPYL FENTANYL QUANTIFICATION: NORMAL NG/ML
SL AMB DEXTROMETHORPHAN QUANTIFICATION: NEGATIVE NG/ML
SL AMB DEXTRORPHAN (DEXTROMETHORPHAN METABOLITE) QUANT: NEGATIVE NG/ML
SL AMB DEXTRORPHAN (DEXTROMETHORPHAN METABOLITE) QUANT: NEGATIVE NG/ML
SL AMB FURANYL FENTANYL QUANTIFICATION: NORMAL NG/ML
SL AMB HALOPERIDOL  QUANTIFICATION: NEGATIVE NG/ML
SL AMB HALOPERIDOL METABOLITE QUANTIFICATION: NEGATIVE NG/ML
SL AMB HYDROXYRISPERIDONE QUANTIFICATION: NEGATIVE NG/ML
SL AMB JWH018 METABOLITE QUANTIFICATION: NEGATIVE NG/ML
SL AMB JWH073 METABOLITE QUANTIFICATION: NEGATIVE NG/ML
SL AMB MDMB-FUBINACA-M1 METABOLITE QUANTIFICATION: NEGATIVE NG/ML
SL AMB METHOXYACETYL FENTANYL QUANTIFICATION: NORMAL NG/ML
SL AMB METHYLONE QUANTIFICATION: NEGATIVE NG/ML
SL AMB N-DESMETHYL U-47700 QUANTIFICATION: NORMAL NG/ML
SL AMB N-DESMETHYL-TRAMADOL QUANTIFICATION: NEGATIVE NG/ML
SL AMB N-DESMETHYLCLOZAPINE QUANTIFICATION: NEGATIVE NG/ML
SL AMB NORQUETIAPINE QUANTIFICATION: NEGATIVE NG/ML
SL AMB PHENTERMINE QUANTIFICATION: NEGATIVE NG/ML
SL AMB QUETIAPINE QUANTIFICATION: NEGATIVE NG/ML
SL AMB RCS4 METABOLITE QUANTIFICATION: NEGATIVE NG/ML
SL AMB RISPERIDONE QUANTIFICATION: NEGATIVE NG/ML
SL AMB RITALINIC ACID QUANTIFICATION: NEGATIVE NG/ML
SL AMB U-47700 QUANTIFICATION: NORMAL NG/ML
SPECIMEN DRAWN SERPL: NEGATIVE NG/ML
TAPENTADOL UR QL CFM: ABNORMAL NG/ML
TEMAZEPAM UR QL CFM: NEGATIVE NG/ML
TEMAZEPAM UR QL CFM: NEGATIVE NG/ML
TRAMADOL UR QL CFM: NEGATIVE NG/ML
URATE/CREAT 24H UR: NEGATIVE NG/ML

## 2020-12-15 ENCOUNTER — TELEMEDICINE (OUTPATIENT)
Dept: PAIN MEDICINE | Facility: CLINIC | Age: 55
End: 2020-12-15
Payer: COMMERCIAL

## 2020-12-15 DIAGNOSIS — G89.12 POST-THORACOTOMY PAIN: ICD-10-CM

## 2020-12-15 DIAGNOSIS — M47.816 LUMBAR SPONDYLOSIS: ICD-10-CM

## 2020-12-15 DIAGNOSIS — G50.0 TRIGEMINAL NEURALGIA: ICD-10-CM

## 2020-12-15 DIAGNOSIS — G89.4 PAIN SYNDROME, CHRONIC: Primary | ICD-10-CM

## 2020-12-15 DIAGNOSIS — M54.16 LUMBAR RADICULOPATHY: ICD-10-CM

## 2020-12-15 DIAGNOSIS — M79.18 MYOFASCIAL PAIN SYNDROME: ICD-10-CM

## 2020-12-15 DIAGNOSIS — M54.40 CHRONIC BILATERAL LOW BACK PAIN WITH SCIATICA, SCIATICA LATERALITY UNSPECIFIED: ICD-10-CM

## 2020-12-15 DIAGNOSIS — M96.3 KYPHOSIS, POSTLAMINECTOMY: ICD-10-CM

## 2020-12-15 DIAGNOSIS — Z79.891 ENCOUNTER FOR LONG-TERM OPIATE ANALGESIC USE: ICD-10-CM

## 2020-12-15 DIAGNOSIS — M48.061 SPINAL STENOSIS OF LUMBAR REGION, UNSPECIFIED WHETHER NEUROGENIC CLAUDICATION PRESENT: ICD-10-CM

## 2020-12-15 DIAGNOSIS — M96.1 POST LAMINECTOMY SYNDROME: ICD-10-CM

## 2020-12-15 DIAGNOSIS — M51.24 THORACIC DISC HERNIATION: ICD-10-CM

## 2020-12-15 DIAGNOSIS — G89.29 CHRONIC BILATERAL LOW BACK PAIN WITH SCIATICA, SCIATICA LATERALITY UNSPECIFIED: ICD-10-CM

## 2020-12-15 DIAGNOSIS — M48.04 THORACIC SPINAL STENOSIS: ICD-10-CM

## 2020-12-15 DIAGNOSIS — M47.14 OTHER SPONDYLOSIS WITH MYELOPATHY, THORACIC REGION: ICD-10-CM

## 2020-12-15 DIAGNOSIS — M47.812 CERVICAL SPONDYLOSIS WITHOUT MYELOPATHY: ICD-10-CM

## 2020-12-15 DIAGNOSIS — G62.9 NEUROPATHY: ICD-10-CM

## 2020-12-15 DIAGNOSIS — F11.20 UNCOMPLICATED OPIOID DEPENDENCE (HCC): ICD-10-CM

## 2020-12-15 DIAGNOSIS — M54.12 CERVICAL RADICULOPATHY: ICD-10-CM

## 2020-12-15 DIAGNOSIS — M79.18 MYOFASCIAL PAIN: ICD-10-CM

## 2020-12-15 PROCEDURE — 99214 OFFICE O/P EST MOD 30 MIN: CPT | Performed by: NURSE PRACTITIONER

## 2020-12-15 RX ORDER — HYDROCODONE BITARTRATE AND ACETAMINOPHEN 5; 325 MG/1; MG/1
1 TABLET ORAL 2 TIMES DAILY PRN
Qty: 50 TABLET | Refills: 0 | Status: SHIPPED | OUTPATIENT
Start: 2021-02-27 | End: 2021-03-08 | Stop reason: SDUPTHER

## 2020-12-15 RX ORDER — HYDROCODONE BITARTRATE AND ACETAMINOPHEN 5; 325 MG/1; MG/1
1 TABLET ORAL 2 TIMES DAILY PRN
Qty: 50 TABLET | Refills: 0 | Status: SHIPPED | OUTPATIENT
Start: 2021-01-29 | End: 2020-12-15 | Stop reason: SDUPTHER

## 2020-12-15 RX ORDER — HYDROCODONE BITARTRATE AND ACETAMINOPHEN 5; 325 MG/1; MG/1
1 TABLET ORAL 2 TIMES DAILY PRN
Qty: 50 TABLET | Refills: 0 | Status: SHIPPED | OUTPATIENT
Start: 2020-12-31 | End: 2020-12-15 | Stop reason: SDUPTHER

## 2020-12-15 RX ORDER — TAPENTADOL HYDROCHLORIDE 100 MG/1
100 TABLET, FILM COATED, EXTENDED RELEASE ORAL DAILY
Qty: 30 TABLET | Refills: 0 | Status: SHIPPED | OUTPATIENT
Start: 2021-01-20 | End: 2020-12-15 | Stop reason: SDUPTHER

## 2020-12-15 RX ORDER — TIZANIDINE 4 MG/1
4 TABLET ORAL EVERY 8 HOURS PRN
Qty: 90 TABLET | Refills: 2 | Status: SHIPPED | OUTPATIENT
Start: 2020-12-15 | End: 2021-03-08 | Stop reason: SDUPTHER

## 2020-12-15 RX ORDER — TAPENTADOL HYDROCHLORIDE 100 MG/1
100 TABLET, FILM COATED, EXTENDED RELEASE ORAL DAILY
Qty: 30 TABLET | Refills: 0 | Status: SHIPPED | OUTPATIENT
Start: 2021-02-18 | End: 2021-03-08 | Stop reason: SDUPTHER

## 2020-12-15 RX ORDER — TAPENTADOL HYDROCHLORIDE 100 MG/1
100 TABLET, FILM COATED, EXTENDED RELEASE ORAL DAILY
Qty: 30 TABLET | Refills: 0 | Status: SHIPPED | OUTPATIENT
Start: 2020-12-22 | End: 2020-12-15 | Stop reason: SDUPTHER

## 2021-02-11 DIAGNOSIS — Z23 ENCOUNTER FOR IMMUNIZATION: ICD-10-CM

## 2021-02-15 ENCOUNTER — IMMUNIZATIONS (OUTPATIENT)
Dept: FAMILY MEDICINE CLINIC | Facility: HOSPITAL | Age: 56
End: 2021-02-15

## 2021-02-15 DIAGNOSIS — Z23 ENCOUNTER FOR IMMUNIZATION: Primary | ICD-10-CM

## 2021-02-15 PROCEDURE — 91300 SARS-COV-2 / COVID-19 MRNA VACCINE (PFIZER-BIONTECH) 30 MCG: CPT

## 2021-02-15 PROCEDURE — 0001A SARS-COV-2 / COVID-19 MRNA VACCINE (PFIZER-BIONTECH) 30 MCG: CPT

## 2021-03-08 ENCOUNTER — IMMUNIZATIONS (OUTPATIENT)
Dept: FAMILY MEDICINE CLINIC | Facility: HOSPITAL | Age: 56
End: 2021-03-08

## 2021-03-08 ENCOUNTER — OFFICE VISIT (OUTPATIENT)
Dept: PAIN MEDICINE | Facility: CLINIC | Age: 56
End: 2021-03-08
Payer: COMMERCIAL

## 2021-03-08 VITALS
TEMPERATURE: 98.8 F | DIASTOLIC BLOOD PRESSURE: 85 MMHG | SYSTOLIC BLOOD PRESSURE: 147 MMHG | HEIGHT: 69 IN | BODY MASS INDEX: 26.36 KG/M2 | WEIGHT: 178 LBS | HEART RATE: 90 BPM

## 2021-03-08 DIAGNOSIS — G89.29 CHRONIC BILATERAL LOW BACK PAIN WITH SCIATICA, SCIATICA LATERALITY UNSPECIFIED: ICD-10-CM

## 2021-03-08 DIAGNOSIS — F11.20 UNCOMPLICATED OPIOID DEPENDENCE (HCC): ICD-10-CM

## 2021-03-08 DIAGNOSIS — M79.18 MYOFASCIAL PAIN SYNDROME: ICD-10-CM

## 2021-03-08 DIAGNOSIS — M47.816 LUMBAR SPONDYLOSIS: ICD-10-CM

## 2021-03-08 DIAGNOSIS — M47.812 CERVICAL SPONDYLOSIS WITHOUT MYELOPATHY: ICD-10-CM

## 2021-03-08 DIAGNOSIS — M54.16 LUMBAR RADICULOPATHY: ICD-10-CM

## 2021-03-08 DIAGNOSIS — G89.4 PAIN SYNDROME, CHRONIC: ICD-10-CM

## 2021-03-08 DIAGNOSIS — M96.1 POST LAMINECTOMY SYNDROME: ICD-10-CM

## 2021-03-08 DIAGNOSIS — M54.12 CERVICAL RADICULOPATHY: ICD-10-CM

## 2021-03-08 DIAGNOSIS — G89.4 CHRONIC PAIN SYNDROME: Primary | ICD-10-CM

## 2021-03-08 DIAGNOSIS — M48.061 SPINAL STENOSIS OF LUMBAR REGION, UNSPECIFIED WHETHER NEUROGENIC CLAUDICATION PRESENT: ICD-10-CM

## 2021-03-08 DIAGNOSIS — Z23 ENCOUNTER FOR IMMUNIZATION: Primary | ICD-10-CM

## 2021-03-08 DIAGNOSIS — M54.40 CHRONIC BILATERAL LOW BACK PAIN WITH SCIATICA, SCIATICA LATERALITY UNSPECIFIED: ICD-10-CM

## 2021-03-08 DIAGNOSIS — Z79.891 ENCOUNTER FOR LONG-TERM OPIATE ANALGESIC USE: ICD-10-CM

## 2021-03-08 PROCEDURE — 80305 DRUG TEST PRSMV DIR OPT OBS: CPT | Performed by: NURSE PRACTITIONER

## 2021-03-08 PROCEDURE — 91300 SARS-COV-2 / COVID-19 MRNA VACCINE (PFIZER-BIONTECH) 30 MCG: CPT

## 2021-03-08 PROCEDURE — 3008F BODY MASS INDEX DOCD: CPT | Performed by: NURSE PRACTITIONER

## 2021-03-08 PROCEDURE — 99214 OFFICE O/P EST MOD 30 MIN: CPT | Performed by: NURSE PRACTITIONER

## 2021-03-08 PROCEDURE — 0002A SARS-COV-2 / COVID-19 MRNA VACCINE (PFIZER-BIONTECH) 30 MCG: CPT

## 2021-03-08 RX ORDER — TAPENTADOL HYDROCHLORIDE 100 MG/1
100 TABLET, FILM COATED, EXTENDED RELEASE ORAL DAILY
Qty: 30 TABLET | Refills: 0 | Status: SHIPPED | OUTPATIENT
Start: 2021-03-08 | End: 2021-03-08 | Stop reason: SDUPTHER

## 2021-03-08 RX ORDER — HYDROCODONE BITARTRATE AND ACETAMINOPHEN 5; 325 MG/1; MG/1
1 TABLET ORAL 2 TIMES DAILY PRN
Qty: 50 TABLET | Refills: 0 | Status: SHIPPED | OUTPATIENT
Start: 2021-04-06 | End: 2021-03-08 | Stop reason: SDUPTHER

## 2021-03-08 RX ORDER — TAPENTADOL HYDROCHLORIDE 100 MG/1
100 TABLET, FILM COATED, EXTENDED RELEASE ORAL DAILY
Qty: 30 TABLET | Refills: 0 | Status: SHIPPED | OUTPATIENT
Start: 2021-04-06 | End: 2021-03-08 | Stop reason: SDUPTHER

## 2021-03-08 RX ORDER — AMLODIPINE BESYLATE 2.5 MG/1
2.5 TABLET ORAL DAILY
COMMUNITY
Start: 2021-02-04

## 2021-03-08 RX ORDER — TIZANIDINE 4 MG/1
4 TABLET ORAL EVERY 8 HOURS PRN
Qty: 90 TABLET | Refills: 2 | Status: SHIPPED | OUTPATIENT
Start: 2021-03-08 | End: 2021-06-08 | Stop reason: SDUPTHER

## 2021-03-08 RX ORDER — HYDROCODONE BITARTRATE AND ACETAMINOPHEN 5; 325 MG/1; MG/1
1 TABLET ORAL 2 TIMES DAILY PRN
Qty: 50 TABLET | Refills: 0 | Status: SHIPPED | OUTPATIENT
Start: 2021-03-08 | End: 2021-03-08 | Stop reason: SDUPTHER

## 2021-03-08 RX ORDER — HYDROCODONE BITARTRATE AND ACETAMINOPHEN 5; 325 MG/1; MG/1
1 TABLET ORAL 2 TIMES DAILY PRN
Qty: 50 TABLET | Refills: 0 | Status: SHIPPED | OUTPATIENT
Start: 2021-05-05 | End: 2021-06-08 | Stop reason: SDUPTHER

## 2021-03-08 RX ORDER — TAPENTADOL HYDROCHLORIDE 100 MG/1
100 TABLET, FILM COATED, EXTENDED RELEASE ORAL DAILY
Qty: 30 TABLET | Refills: 0 | Status: SHIPPED | OUTPATIENT
Start: 2021-05-05 | End: 2021-06-08 | Stop reason: SDUPTHER

## 2021-03-08 NOTE — PROGRESS NOTES
Assessment:  1  Chronic pain syndrome    2  Cervical radiculopathy    3  Lumbar radiculopathy    4  Lumbar spondylosis    5  Cervical spondylosis without myelopathy    6  Chronic bilateral low back pain with sciatica, sciatica laterality unspecified    7  Pain syndrome, chronic    8  Post laminectomy syndrome    9  Encounter for long-term opiate analgesic use    10  Spinal stenosis of lumbar region, unspecified whether neurogenic claudication present    11  Uncomplicated opioid dependence ([address] )    12  Myofascial pain syndrome        Plan:  1  The patient may continue Nucynta  mg daily #30 tablets for 30 days and Norco 5/325mg 1 tab 1-2 times a day PRN breakthrough pain #50 tablets for 30 days  The patient was given a 3 month supply of prescriptions for both medications with a Do Not Fill date(s) of today, April 6, 2021 and May 5, 2021  While the patient was in the office today an opioid contract was thoroughly reviewed and signed by the patient  The patient was given adequate time to ask questions in regards to the contract and a signed copy was sent home for their records  The patient also completed a BECKS depression inventory and SOAPP-R today, as part of our yearly opioid management program     The patient did not have their opioid medications available for confirmation or counting while in the office today  I reviewed with the patient that as per the opioid contract, they are to bring in the last filled prescription for all of their opioid medications, with what they have left to every office visit  I advised the patient that if they would continue to not bring in their prescriptions as discussed, we may not be able to continue prescribing opioid medications in the future  The patient was agreeable and verbalized an understanding      [place] Prescription Drug Monitoring Program report was reviewed and was appropriate     A urine drug screen was collected at today's office visit as part of our medication management protocol  The point of care testing results were appropriate for what was being prescribed  The specimen will be sent for confirmatory testing  The drug screen is medically necessary because the patient is either dependent on opioid medication or is being considered for opioid medication therapy and the results could impact ongoing or future treatment  The drug screen is to evaluate for the presences or absence of prescribed, non-prescribed, and/or illicit drugs/substances  There are risks associated with opioid medications, including dependence, addiction and tolerance  The patient understands and agrees to use these medications only as prescribed  Potential side effects of the medications include, but are not limited to, constipation, drowsiness, addiction, impaired judgment and risk of fatal overdose if not taken as prescribed  The patient was warned against driving while taking sedation medications  Sharing medications is a felony  At this point in time, the patient is showing no signs of addiction, abuse, diversion or suicidal ideation  2   Patient may continue tizanidine 4 mg q 8 hours p r n  myofascial pain  This medication was refilled today  3  We can repeat right L3 and L4 TFESI p r n   4  The patient will continue to follow with [employer] [facility] [facility] [employer] for periodic reprogramming sessions of his spinal cord stimulator device as needed  5  The patient will continue to follow with Neurosurgery as needed  6  We can repeat left acromioclavicular joint injection p r n   7  The patient may continue gabapentin as prescribed by Neurology  8  The patient will continue with his home exercise program  9  The patient will follow-up in 12 weeks or sooner if needed     M*Modal software was used to dictate this note  It may contain errors with dictating incorrect words or incorrect spelling  Please contact the provider directly with any questions  History of Present Illness:     The patient is a 54 y o  male with a history of a T12 decompression and fusion with instrumentation status post [place] dorsal spinal cord stimulator and [employer] peripheral stimulator last seen on 12/15/20 who presents for a follow up office visit in regards to chronic lumbosacral back pain secondary to  Lumbar post-laminectomy syndrome, chronic pain syndrome, lumbar spondylosis, lumbar stenosis, lumbar degenerative disc disease, osteoarthritis and myofascial pain syndrome  The patient denies bowel or bladder incontinence or saddle anesthesia  Unfortunately the patient has failed lumbar rhizotomy in the past   He is managing his pain with Nucynta  mg daily with Norco 5/325 mg twice a day as needed for breakthrough pain, tizanidine 4 mg p r n , and gabapentin as prescribed by Neurology with a 65% improvement of his pain without side effects  The patient rates his pain a 5/10 on the numeric pain rating scale  States his pain is constant nature and bothersome the morning at night  He characterizes the pain as burning, dull aching, throbbing, pressure-like, shooting, numbness and pins and needles  Pain Contract Signed: 3/8/21  Last Urine Drug Screen: 3/8/21  Last nucynta per pt  3/8/21    I have personally reviewed and/or updated the patient's past medical history, past surgical history, family history, social history, current medications, allergies, and vital signs today  Review of Systems:    Review of Systems   Respiratory: Negative for shortness of breath  Cardiovascular: Negative for chest pain  Gastrointestinal: Negative for constipation, diarrhea, nausea and vomiting  Musculoskeletal: Negative for arthralgias, gait problem, joint swelling and myalgias  Skin: Negative for rash  Neurological: Negative for dizziness, seizures and weakness  All other systems reviewed and are negative  No past medical history on file  No past surgical history on file      No family history on file      Social History     Occupational History    Not on file   Tobacco Use    Smoking status: Current Every Day Smoker     Types: Cigarettes    Smokeless tobacco: Never Used   Substance and Sexual Activity    Alcohol use: No    Drug use: No    Sexual activity: Not on file         Current Outpatient Medications:     amLODIPine (NORVASC) 2 5 mg tablet, Take 2 5 mg by mouth daily, Disp: , Rfl:     atorvastatin (LIPITOR) 80 mg tablet, Take by mouth, Disp: , Rfl:     CHANTIX STARTING MONTH ELÍAS 0 5 MG X 11 & 1 MG X 42 tablet, Take by mouth daily As directed, Disp: , Rfl: 0    diclofenac sodium (VOLTAREN) 1 %, Apply 1 application topically 4 (four) times a day, Disp: , Rfl:     gabapentin (NEURONTIN) 600 MG tablet, Take 1 tablet by mouth 2 (two) times a day, Disp: , Rfl:     gabapentin (NEURONTIN) 800 mg tablet, Take 1 tablet by mouth 2 (two) times a day, Disp: , Rfl:     [START ON 5/5/2021] HYDROcodone-acetaminophen (NORCO) 5-325 mg per tablet, Take 1 tablet by mouth 2 (two) times a day as needed for painMax Daily Amount: 2 tablets, Disp: 50 tablet, Rfl: 0    lamoTRIgine (LaMICtal) 100 MG TBDP, Take 100 mg by mouth daily, Disp: , Rfl:     LamoTRIgine 25 (21)-50 (7) MG KIT, USE 1 KIT AS DIRECTED, Disp: , Rfl: 0    lidocaine (LIDODERM) 5 %, Apply 1 patch topically, Disp: , Rfl:     lisinopril-hydrochlorothiazide (PRINZIDE,ZESTORETIC) 20-12 5 MG per tablet, Take 1 tablet by mouth daily, Disp: , Rfl: 3    metFORMIN (GLUCOPHAGE) 1000 MG tablet, Take 1 tablet by mouth 2 (two) times a day, Disp: , Rfl:     OXcarbazepine (TRILEPTAL) 600 mg tablet, 2 TABLET(S) BY MOUTH TWO TIMES DAILY, Disp: , Rfl: 2    [START ON 5/5/2021] Tapentadol HCl ER (Nucynta ER) 100 MG TB12, Take 1 tablet (100 mg total) by mouth dailyMax Daily Amount: 100 mg, Disp: 30 tablet, Rfl: 0    tiZANidine (ZANAFLEX) 4 mg tablet, Take 1 tablet (4 mg total) by mouth every 8 (eight) hours as needed for muscle spasms, Disp: 90 tablet, Rfl: 2    Allergies   Allergen Reactions    Other     Sulfa Antibiotics Edema     Annotation - [id number]: face and hands redness and swelling       Physical Exam:    /85   Pulse 90   Temp 98 8 °F (37 1 °C)   Ht 5' 9" (1 753 m)   Wt 80 7 kg (178 lb)   BMI 26 29 kg/m²     Constitutional:normal, well developed, well nourished, alert, in no distress and non-toxic and no overt pain behavior  Eyes:anicteric  HEENT:grossly intact  Neck:supple, symmetric, trachea midline and no masses   Pulmonary:even and unlabored  Cardiovascular:No edema or pitting edema present  Skin:Normal without rashes or lesions and well hydrated  Psychiatric:Mood and affect appropriate  Neurologic:Cranial Nerves II-XII grossly intact  Musculoskeletal:Slightly antalgic gait but steady without the use of assistive devices      Imaging  No orders to display   CT LUMBAR SPINE     INDICATION:   G95 20: Unspecified cord compression  M54 5: Low back pain  M54 12: Radiculopathy, cervical region      COMPARISON: 5/5/2015 CT     TECHNIQUE:  Contiguous axial images through the lumbar spine were obtained  Sagittal and coronal reconstructions were performed        Radiation dose length product (DLP) for this visit:  1086 mGy-cm   This examination, like all CT scans performed in the [facility], was performed utilizing techniques to minimize radiation dose exposure, including the use of iterative   reconstruction and automated exposure control        IMAGE QUALITY:  Diagnostic      FINDINGS:     ALIGNMENT:  Normal alignment of the lumbar spine  No spondylolisthesis or spondylolysis      VERTEBRAL BODIES:  No fracture  No lytic or blastic lesion      DEGENERATIVE CHANGES:     Lower Thoracic spine:  Normal lower thoracic disc spaces  ]     L1-2:  Normal disc height  No herniation  Normal facet joints  No canal or foraminal stenosis      L2-3:  Normal disc height  No herniation  Normal facet joints    No canal or foraminal stenosis      L3-4: Progressive mild-to-moderate degenerative spondylosis, mild central canal and bilateral foraminal stenosis     L4-5:  Progressive mild to moderate degenerative spondylosis, mild central canal and bilateral foraminal stenosis, new small broad left-sided disc protrusion, possible left L5 nerve root encroachment      L5-S1:  Progressive moderate degenerative spondylosis, moderate to severe left foraminal stenosis, possible left L5 nerve root encroachment      PARASPINAL SOFT TISSUES:   Normal      IMPRESSION:     Progressive multilevel degenerative spondylosis     Progressive mild central canal and bilateral foraminal stenosis L3-4     Progressive mild central canal and bilateral foraminal stenosis, new small left-sided disc protrusion L4-5     Progressive moderate to severe left foraminal stenosis L5-S1     CT CERVICAL SPINE - WITHOUT CONTRAST     INDICATION:   G95 20: Unspecified cord compression  M54 5: Low back pain  M54 12: Radiculopathy, cervical region      COMPARISON:  5/31/2016     TECHNIQUE:  CT examination of the cervical spine was performed without intravenous contrast   Contiguous axial images were obtained  Sagittal and coronal reconstructions were performed        Radiation dose length product (DLP) for this visit:  660 mGy-cm   This examination, like all CT scans performed in the [facility], was performed utilizing techniques to minimize radiation dose exposure, including the use of iterative   reconstruction and automated exposure control        IMAGE QUALITY:  Diagnostic      FINDINGS:     ALIGNMENT:  Normal alignment of the cervical spine  No subluxation      VERTEBRAL BODIES:  There is no fracture  There is no lytic or destructive lesion    The patient has undergone a previous left retromastoid occipital craniectomy     DEGENERATIVE CHANGES:  Minimal endplate and uncinate joint hypertrophic changes noted at C3-4, C5-6     PREVERTEBRAL AND PARASPINAL SOFT TISSUES:  8 chest wall electronic device is present with a lead projecting into the soft tissues overlying the right mastoid temporal bone, unchanged from prior examination      THORACIC INLET:  Normal      IMPRESSION:     No evidence of disc herniation, canal stenosis or foraminal narrowing      Previous left paramedian retromastoid craniectomy appears unchanged           Orders Placed This Encounter   Procedures    MM ALL_Prescribed Meds and Special Instructions    MM DT_Alprazolam Definitive Test    MM DT_Amphetamine Definitive Test    MM DT_Aripiprazole Definitive Test    MM DT_Bath Salts Definitive Test    MM DT_Buprenorphine Definitive Test    MM DT_Butalbital Definitive Test    MM DT_Clonazepam Definitive Test    MM DT_Clozapine Definitive Test    MM DT_Cocaine Definitive Test    MM DT_Codeine Definitive Test    MM DT_Desipramine Definitive Test    MM DT_Dextromethorphan Definitive Test    MM Diazepam Definitive Test    MM DT_Ethyl Glucuronide/Ethyl Sulfate Definitive Test    MM DT_Fentanyl Definitive Test    MM DT_Haloperidol Definitive Test    MM DT_Heroin Definitive Test    MM DT_Hydrocodone Definitive Test    MM DT_Hydromorphone Definitive Test    MM DT_Imipramine Definitive Test    MM DT_Kratom Definitive Test    MM DT_Levorphanol Definitive Test    MM Lorazepam Definitive Test    MM DT_MDMA Definitive Test    MM DT_Meperidine Definitive Test    MM DT_Methadone Definitive Test    MM DT_Methamphetamine Definitive Test    MM DT_Morphine Definitive Test    MM DT_Olanzapine Definitive Test    MM DT_Oxazepam Definitive Test    MM DT_Oxycodone Definitive Test    MM DT_Oxymorphone Definitive Test    MM DT_Phencyclidine Definitive Test    MM DT_Phenobarbital Definitive Test    MM DT_Phentermine Definitive Test    MM DT_Quetiapine Definitive Test    MM DT_Risperidone Definitive Test    MM DT_Secobarbital Definitive Test    MM DT_Spice Definitive Test    MM DT_Tapentadol Definitive Test    MM DT_Temazapam Definitive Test    MM DT_THC Definitive Test    MM DT_Tramadol Definitive Test    MM DT_Methylphenidate Definitive Test

## 2021-03-08 NOTE — PATIENT INSTRUCTIONS
Opioid Safety   AMBULATORY CARE:   Opioid safety  includes the correct use, storage, and disposal of opioids  Examples of opioid medicines to treat pain include oxycodone, morphine, fentanyl, and codeine  Call your local emergency number (911 in the [address]), or have someone else call if:   · You have a seizure  · You cannot be woken  · You have trouble staying awake and your breathing is slow or shallow  · Your speech is slurred, or you are confused  · You are dizzy or stumble when you walk  Call your doctor, or have someone close to you call if:   · You are extremely drowsy, or you have trouble staying awake or speaking  · You have pale or clammy skin  · You have blue fingernails or lips  · Your heartbeat is slower than normal     · You cannot stop vomiting  · You have questions or concerns about your condition or care  Use opioids safely:   · Take prescribed opioids exactly as directed  Opioids come with directions based on the kind of opioid and how it is given  Do not take more than the recommended amount, or for longer than needed  · Do not give opioids to others or take opioids that belong to someone else  Misuse of opioids can lead to an addiction or overdose  · Do not mix opioids with other medicines or alcohol  The combination can cause an overdose, or lead to a coma  · Do not drive or operate heavy machinery after you take the opioid  Your provider or pharmacist can tell you how long to wait after a dose before you do these activities  · Talk to your healthcare provider if you have any side effects  He or she can help you prevent or relieve side effects  Side effects include nausea, sleepiness, itching, and trouble thinking clearly  Manage constipation:  Constipation is the most common side effect of opioid medicine  Constipation is when you have hard, dry bowel movements, or you go longer than usual between bowel movements   Tell your healthcare provider about all changes in your bowel movements while you are taking opioids  He or she may recommend laxative medicine to help you have a bowel movement  He or she may also change the kind of opioid you are taking, or change when you take it  The following are more ways you can prevent or relieve constipation:  · Drink liquids as directed  You may need to drink extra liquids to help soften and move your bowels  Ask how much liquid to drink each day and which liquids are best for you  · Eat high-fiber foods  This may help decrease constipation by adding bulk to your bowel movements  High-fiber foods include fruits, vegetables, whole-grain breads and cereals, and beans  Your healthcare provider or dietitian can help you create a high-fiber meal plan  Your provider may also recommend a fiber supplement if you cannot get enough fiber from food  · Exercise regularly  Regular physical activity can help stimulate your intestines  Walking is a good exercise to prevent or relieve constipation  Ask which exercises are best for you  · Schedule a time each day to have a bowel movement  This may help train your body to have regular bowel movements  Bend forward while you are on the toilet to help move the bowel movement out  Sit on the toilet for at least 10 minutes, even if you do not have a bowel movement  Store opioids safely:   · Store opioids where others cannot easily get them  Keep them in a locked cabinet or secure area  Do not  keep them in a purse or other bag you carry with you  A person may be looking for something else and find the opioids  · Make sure opioids are stored out of the reach of children  A child can easily overdose on opioids  Opioids may look like candy to a small child  The best way to dispose of opioids: The laws vary by country and area   In the [place], the best way is to return the opioids through a take-back program  This program is offered by the [employer] Enforcement Agency ([address] [facility])  The following are options for using the program:  · Take the opioids to a DELANO collection site  The site is often a law enforcement center  Call your local law enforcement center for scheduled take-back days in your area  You will be given information on where to go if the collection site is in a different location  · Take the opioids to an approved pharmacy or hospital   A pharmacy or hospital may be set up as a collection site  You will need to ask if it is a DELANO collection site if you were not directed there  A pharmacy or doctor's office may not be able to take back opioids unless it is a DELANO site  · Use a mail-back system  This means you are given containers to put the opioids into  You will then mail them in the containers  · Use a take-back drop box  This is a place to leave the opioids at any time  People and animals will not be able to get into the box  Your local law enforcement agency can tell you where to find a drop box in your area  Other safe ways to dispose of opioids: The medicine may come with disposal instructions  The instructions may vary depending on the brand of medicine you are using  Instructions may come in a Medication Guide, but not every medicine has one  You may instead get instructions from your pharmacy or doctor  Follow instructions carefully  The following are general guidelines to follow:  · Find out if you can flush the opioid  Some opioids can be flushed down the toilet or poured into the sink  You will need to contact authorities in your area to see if this is an option for you  The FDA also offers a list of medicines that are safe to flush down the toilet  You can check the list if you cannot get the information for your local area  · Ask your waste management company about rules for putting opioids in the trash  The company will be able to give you specific directions   Scratch out personal information on the original medicine label so it cannot be read  Then put it in the trash  Do not label the trash or put any information on it about the opioids  It should look like regular household trash so no one is tempted to look for the opioids  Keep the trash out of the reach of children and animals  Always make sure trash is secure  · Talk to officials if you live in a facility  If you live in a nursing home or assisted living center, talk to an official  The person will know the rules for your area  Other ways to manage pain:   · Ask your healthcare provider about non-opioid medicines to control pain  Nonprescription medicines include NSAIDs (such as ibuprofen) and acetaminophen  Prescription medicines include muscle relaxers, antidepressants, and steroids  · Pain may be managed without any medicines  Some ways to relieve pain include massage, aromatherapy, or meditation  Physical or occupational therapy may also help  For more information:   · Drug Enforcement Administration  [facility] [facility] [place] [address]  Phone: 3- 358 - [phone]  Web Address: [facility] [email or web address]_disposal/    · [address]  and Drug Administration  [facility]  [place] [address] [facility]  Phone: 3- [phone]  Web Address: http://[employer]/  Follow up with your doctor or pain specialist as directed: You may need to have your dose adjusted  Your doctor or pain specialist can also help you find ways to manage pain without opioids  Write down your questions so you remember to ask them during your visits  © Copyright [address] Information is for End User's use only and may not be sold, redistributed or otherwise used for commercial purposes  All illustrations and images included in CareNotes® are the copyrighted property of [employer]  or [facility]   The above information is an  only  It is not intended as medical advice for individual conditions or treatments   Talk to your doctor, nurse or pharmacist before following any medical regimen to see if it is safe and effective for you

## 2021-03-10 LAB
6MAM UR QL CFM: NEGATIVE NG/ML
7AMINOCLONAZEPAM UR QL CFM: NEGATIVE NG/ML
A-OH ALPRAZ UR QL CFM: NEGATIVE NG/ML
AMPHET UR QL CFM: NEGATIVE NG/ML
AMPHET UR QL CFM: NEGATIVE NG/ML
BUPRENORPHINE UR QL CFM: NEGATIVE NG/ML
BUTALBITAL UR QL CFM: NEGATIVE NG/ML
BZE UR QL CFM: NEGATIVE NG/ML
CODEINE UR QL CFM: NEGATIVE NG/ML
DESIPRAMINE UR QL CFM: NEGATIVE NG/ML
DESIPRAMINE UR QL CFM: NEGATIVE NG/ML
EDDP UR QL CFM: NEGATIVE NG/ML
ETHYL GLUCURONIDE UR QL CFM: NEGATIVE NG/ML
ETHYL SULFATE UR QL SCN: NEGATIVE NG/ML
FENTANYL UR QL CFM: NEGATIVE NG/ML
GLIADIN IGG SER IA-ACNC: NEGATIVE NG/ML
GLUCOSE 30M P 50 G LAC PO SERPL-MCNC: NEGATIVE NG/ML
HYDROCODONE UR QL CFM: NORMAL NG/ML
HYDROCODONE UR QL CFM: NORMAL NG/ML
HYDROMORPHONE UR QL CFM: NORMAL NG/ML
IMIPRAMINE UR QL CFM: NEGATIVE NG/ML
LORAZEPAM UR QL CFM: NEGATIVE NG/ML
MDMA UR QL CFM: NEGATIVE NG/ML
ME-PHENIDATE UR QL CFM: NEGATIVE NG/ML
MEPERIDINE UR QL CFM: NEGATIVE NG/ML
MEPHEDRONE UR QL CFM: NEGATIVE NG/ML
METHADONE UR QL CFM: NEGATIVE NG/ML
METHAMPHET UR QL CFM: NEGATIVE NG/ML
MORPHINE UR QL CFM: NEGATIVE NG/ML
MORPHINE UR QL CFM: NEGATIVE NG/ML
NORBUPRENORPHINE UR QL CFM: NEGATIVE NG/ML
NORDIAZEPAM UR QL CFM: NEGATIVE NG/ML
NORFENTANYL UR QL CFM: NEGATIVE NG/ML
NORHYDROCODONE UR QL CFM: NORMAL NG/ML
NORHYDROCODONE UR QL CFM: NORMAL NG/ML
NORMEPERIDINE UR QL CFM: NEGATIVE NG/ML
NOROXYCODONE UR QL CFM: NEGATIVE NG/ML
OLANZAPINE QUANTIFICATION: NEGATIVE NG/ML
OPC-3373 QUANTIFICATION: NEGATIVE
OXAZEPAM UR QL CFM: NEGATIVE NG/ML
OXYCODONE UR QL CFM: NEGATIVE NG/ML
OXYMORPHONE UR QL CFM: NEGATIVE NG/ML
OXYMORPHONE UR QL CFM: NEGATIVE NG/ML
PCP UR QL CFM: NEGATIVE NG/ML
PHENOBARB UR QL CFM: NEGATIVE NG/ML
SECOBARBITAL UR QL CFM: NEGATIVE NG/ML
SL AMB 3-METHYL-FENTANYL QUANTIFICATION: NORMAL NG/ML
SL AMB 4-ANPP QUANTIFICATION: NORMAL NG/ML
SL AMB 4-FIBF QUANTIFICATION: NORMAL NG/ML
SL AMB 5F-ADB-M7 METABOLITE QUANTIFICATION: NEGATIVE NG/ML
SL AMB 7-OH-MITRAGYNINE (KRATOM ALKALOID) QUANTIFICATION: NEGATIVE NG/ML
SL AMB AB-FUBINACA-M3 METABOLITE QUANTIFICATION: NEGATIVE NG/ML
SL AMB ACETYL FENTANYL QUANTIFICATION: NORMAL NG/ML
SL AMB ACETYL NORFENTANYL QUANTIFICATION: NORMAL NG/ML
SL AMB ACRYL FENTANYL QUANTIFICATION: NORMAL NG/ML
SL AMB BATH SALTS: NEGATIVE NG/ML
SL AMB BUTRYL FENTANYL QUANTIFICATION: NORMAL NG/ML
SL AMB CARFENTANIL QUANTIFICATION: NORMAL NG/ML
SL AMB CLOZAPINE QUANTIFICATION: NEGATIVE NG/ML
SL AMB CTHC (MARIJUANA METABOLITE) QUANTIFICATION: NEGATIVE NG/ML
SL AMB CYCLOPROPYL FENTANYL QUANTIFICATION: NORMAL NG/ML
SL AMB DEXTROMETHORPHAN QUANTIFICATION: NEGATIVE NG/ML
SL AMB DEXTRORPHAN (DEXTROMETHORPHAN METABOLITE) QUANT: NEGATIVE NG/ML
SL AMB DEXTRORPHAN (DEXTROMETHORPHAN METABOLITE) QUANT: NEGATIVE NG/ML
SL AMB FURANYL FENTANYL QUANTIFICATION: NORMAL NG/ML
SL AMB HALOPERIDOL  QUANTIFICATION: NEGATIVE NG/ML
SL AMB HALOPERIDOL METABOLITE QUANTIFICATION: NEGATIVE NG/ML
SL AMB HYDROXYRISPERIDONE QUANTIFICATION: NEGATIVE NG/ML
SL AMB JWH018 METABOLITE QUANTIFICATION: NEGATIVE NG/ML
SL AMB JWH073 METABOLITE QUANTIFICATION: NEGATIVE NG/ML
SL AMB MDMB-FUBINACA-M1 METABOLITE QUANTIFICATION: NEGATIVE NG/ML
SL AMB METHOXYACETYL FENTANYL QUANTIFICATION: NORMAL NG/ML
SL AMB METHYLONE QUANTIFICATION: NEGATIVE NG/ML
SL AMB N-DESMETHYL U-47700 QUANTIFICATION: NORMAL NG/ML
SL AMB N-DESMETHYL-TRAMADOL QUANTIFICATION: NEGATIVE NG/ML
SL AMB N-DESMETHYLCLOZAPINE QUANTIFICATION: NEGATIVE NG/ML
SL AMB NORQUETIAPINE QUANTIFICATION: NEGATIVE NG/ML
SL AMB PHENTERMINE QUANTIFICATION: NEGATIVE NG/ML
SL AMB QUETIAPINE QUANTIFICATION: NEGATIVE NG/ML
SL AMB RCS4 METABOLITE QUANTIFICATION: NEGATIVE NG/ML
SL AMB RISPERIDONE QUANTIFICATION: NEGATIVE NG/ML
SL AMB RITALINIC ACID QUANTIFICATION: NEGATIVE NG/ML
SL AMB U-47700 QUANTIFICATION: NORMAL NG/ML
SPECIMEN DRAWN SERPL: NEGATIVE NG/ML
TAPENTADOL UR QL CFM: NORMAL NG/ML
TEMAZEPAM UR QL CFM: NEGATIVE NG/ML
TEMAZEPAM UR QL CFM: NEGATIVE NG/ML
TRAMADOL UR QL CFM: NEGATIVE NG/ML
URATE/CREAT 24H UR: NEGATIVE NG/ML

## 2021-04-14 ENCOUNTER — TELEPHONE (OUTPATIENT)
Dept: PAIN MEDICINE | Facility: CLINIC | Age: 56
End: 2021-04-14

## 2021-04-14 DIAGNOSIS — Z79.891 ENCOUNTER FOR LONG-TERM USE OF OPIATE ANALGESIC: ICD-10-CM

## 2021-04-14 DIAGNOSIS — F11.20 UNCOMPLICATED OPIOID DEPENDENCE (HCC): Primary | ICD-10-CM

## 2021-04-14 RX ORDER — NALOXONE HYDROCHLORIDE 4 MG/.1ML
SPRAY NASAL
Qty: 1 EACH | Refills: 1 | Status: SHIPPED | OUTPATIENT
Start: 2021-04-14 | End: 2022-04-11 | Stop reason: SDUPTHER

## 2021-04-14 NOTE — TELEPHONE ENCOUNTER
Received a notification in the mail that the patient is currently taking an amount of opioid medication (50 OME/day) that requires places him at risk for overdose and requires a narcan script to be sent to the pharmacy  I did send this RX in  Please inform him that this medication places him at a higher-risk for respiratory depression/overdose  This nasal spray is an injectable opiate antagonist which is indicated for emergency treatment for opiate overdose  Instructions on how to use are on the RX  We advise he tell someone close to him where the RX is kept and how to use it in case of accidental over dose

## 2021-06-08 ENCOUNTER — OFFICE VISIT (OUTPATIENT)
Dept: PAIN MEDICINE | Facility: CLINIC | Age: 56
End: 2021-06-08
Payer: COMMERCIAL

## 2021-06-08 VITALS
HEART RATE: 88 BPM | BODY MASS INDEX: 25.92 KG/M2 | HEIGHT: 69 IN | TEMPERATURE: 98.8 F | SYSTOLIC BLOOD PRESSURE: 143 MMHG | WEIGHT: 175 LBS | DIASTOLIC BLOOD PRESSURE: 94 MMHG

## 2021-06-08 DIAGNOSIS — M96.1 POST LAMINECTOMY SYNDROME: ICD-10-CM

## 2021-06-08 DIAGNOSIS — M47.812 CERVICAL SPONDYLOSIS WITHOUT MYELOPATHY: ICD-10-CM

## 2021-06-08 DIAGNOSIS — G62.9 NEUROPATHY: ICD-10-CM

## 2021-06-08 DIAGNOSIS — G89.29 CHRONIC BILATERAL LOW BACK PAIN WITH SCIATICA, SCIATICA LATERALITY UNSPECIFIED: ICD-10-CM

## 2021-06-08 DIAGNOSIS — M48.04 THORACIC SPINAL STENOSIS: ICD-10-CM

## 2021-06-08 DIAGNOSIS — G89.4 PAIN SYNDROME, CHRONIC: ICD-10-CM

## 2021-06-08 DIAGNOSIS — F11.20 UNCOMPLICATED OPIOID DEPENDENCE (HCC): ICD-10-CM

## 2021-06-08 DIAGNOSIS — M47.816 LUMBAR SPONDYLOSIS: ICD-10-CM

## 2021-06-08 DIAGNOSIS — M54.40 CHRONIC BILATERAL LOW BACK PAIN WITH SCIATICA, SCIATICA LATERALITY UNSPECIFIED: ICD-10-CM

## 2021-06-08 DIAGNOSIS — M54.16 LUMBAR RADICULOPATHY: ICD-10-CM

## 2021-06-08 DIAGNOSIS — M79.18 MYOFASCIAL PAIN SYNDROME: ICD-10-CM

## 2021-06-08 DIAGNOSIS — M48.061 SPINAL STENOSIS OF LUMBAR REGION, UNSPECIFIED WHETHER NEUROGENIC CLAUDICATION PRESENT: ICD-10-CM

## 2021-06-08 DIAGNOSIS — M54.12 CERVICAL RADICULOPATHY: ICD-10-CM

## 2021-06-08 DIAGNOSIS — Z79.891 ENCOUNTER FOR LONG-TERM OPIATE ANALGESIC USE: ICD-10-CM

## 2021-06-08 DIAGNOSIS — M47.14 OTHER SPONDYLOSIS WITH MYELOPATHY, THORACIC REGION: ICD-10-CM

## 2021-06-08 DIAGNOSIS — G89.4 CHRONIC PAIN SYNDROME: Primary | ICD-10-CM

## 2021-06-08 PROCEDURE — 3008F BODY MASS INDEX DOCD: CPT | Performed by: NURSE PRACTITIONER

## 2021-06-08 PROCEDURE — 99214 OFFICE O/P EST MOD 30 MIN: CPT | Performed by: NURSE PRACTITIONER

## 2021-06-08 RX ORDER — TIZANIDINE 4 MG/1
4 TABLET ORAL EVERY 8 HOURS PRN
Qty: 90 TABLET | Refills: 2 | Status: SHIPPED | OUTPATIENT
Start: 2021-06-08 | End: 2022-01-17

## 2021-06-08 RX ORDER — HYDROCODONE BITARTRATE AND ACETAMINOPHEN 5; 325 MG/1; MG/1
1 TABLET ORAL 2 TIMES DAILY PRN
Qty: 50 TABLET | Refills: 0 | Status: SHIPPED | OUTPATIENT
Start: 2021-07-21 | End: 2021-06-08 | Stop reason: SDUPTHER

## 2021-06-08 RX ORDER — HYDROCODONE BITARTRATE AND ACETAMINOPHEN 5; 325 MG/1; MG/1
1 TABLET ORAL 2 TIMES DAILY PRN
Qty: 50 TABLET | Refills: 0 | Status: SHIPPED | OUTPATIENT
Start: 2021-08-19 | End: 2021-08-03 | Stop reason: SDUPTHER

## 2021-06-08 RX ORDER — HYDROCODONE BITARTRATE AND ACETAMINOPHEN 5; 325 MG/1; MG/1
1 TABLET ORAL 2 TIMES DAILY PRN
Qty: 50 TABLET | Refills: 0 | Status: SHIPPED | OUTPATIENT
Start: 2021-06-22 | End: 2021-06-08 | Stop reason: SDUPTHER

## 2021-06-08 RX ORDER — TAPENTADOL HYDROCHLORIDE 100 MG/1
100 TABLET, FILM COATED, EXTENDED RELEASE ORAL DAILY
Qty: 30 TABLET | Refills: 0 | Status: SHIPPED | OUTPATIENT
Start: 2021-06-25 | End: 2021-06-08 | Stop reason: SDUPTHER

## 2021-06-08 RX ORDER — TAPENTADOL HYDROCHLORIDE 100 MG/1
100 TABLET, FILM COATED, EXTENDED RELEASE ORAL DAILY
Qty: 30 TABLET | Refills: 0 | Status: SHIPPED | OUTPATIENT
Start: 2021-08-22 | End: 2021-08-03 | Stop reason: SDUPTHER

## 2021-06-08 RX ORDER — TAPENTADOL HYDROCHLORIDE 100 MG/1
100 TABLET, FILM COATED, EXTENDED RELEASE ORAL DAILY
Qty: 30 TABLET | Refills: 0 | Status: SHIPPED | OUTPATIENT
Start: 2021-07-24 | End: 2021-06-08 | Stop reason: SDUPTHER

## 2021-06-08 NOTE — PROGRESS NOTES
Assessment:  1  Chronic pain syndrome    2  Cervical radiculopathy    3  Lumbar radiculopathy    4  Neuropathy    5  Other spondylosis with myelopathy, thoracic region    6  Lumbar spondylosis    7  Cervical spondylosis without myelopathy    8  Chronic bilateral low back pain with sciatica, sciatica laterality unspecified    9  Uncomplicated opioid dependence ([address] )    10  Post laminectomy syndrome    11  Thoracic spinal stenosis    12  Spinal stenosis of lumbar region, unspecified whether neurogenic claudication present    13  Encounter for long-term opiate analgesic use    14  Pain syndrome, chronic    15  Myofascial pain syndrome        Plan:  1  Patient may continue Nucynta  mg daily for pain #30 tablets for 30 days as prescribed  The patient was given a 3 month supply of prescriptions with a Do Not Fill date(s) of  June 25, 2021, July 24, 2021 and August 22, 2021  the patient may also continue Norco 5/325 mg 1 tablet 1 to 2 times a day as needed for breakthrough pain #50 tablets for 30 days  The patient was given a 3 month supply of prescriptions with a Do Not Fill date(s) of June 22, 2021, July 21, 2021 and August 19, 2021  The patient did not have their opioid medications available for confirmation or counting while in the office today  This is the 2nd time that the patient for got their medication  I reviewed with the patient that as per the opioid contract, they are to bring in the last filled prescription for all of their opioid medications, with what they have left to every office visit  I advised the patient that if they would continue to not bring in their prescriptions as discussed, we may not be able to continue prescribing opioid medications in the future and will start to wean him off of these medications at the next office visit  The patient was agreeable and verbalized an understanding      [place] Prescription Drug Monitoring Program report was reviewed and was appropriate There are risks associated with opioid medications, including dependence, addiction and tolerance  The patient understands and agrees to use these medications only as prescribed  Potential side effects of the medications include, but are not limited to, constipation, drowsiness, addiction, impaired judgment and risk of fatal overdose if not taken as prescribed  The patient was warned against driving while taking sedation medications  Sharing medications is a felony  At this point in time, the patient is showing no signs of addiction, abuse, diversion or suicidal ideation  2    The patient may continue tizanidine 4 mg q 8 hours p r n  myofascial pain  This medication was refilled today  3  We can repeat right L3 and L4 TFESI p r n   4  The patient will continue to follow with [employer] [facility] [employer] for p r n  reprogramming sessions of his spinal cord stimulator device is as needed  5  We can repeat left acromioclavicular joint injection p r n   6  The patient may continue gabapentin as prescribed by Neurology  7  The patient will continue with his home exercise program  8  I will follow up with the patient in 3 months or sooner if needed     M*Modal software was used to dictate this note  It may contain errors with dictating incorrect words or incorrect spelling  Please contact the provider directly with any questions  History of Present Illness: The patient is a 54 y o  male with a history of a T12 decompression and fusion with instrumentation status post [place] dorsal spinal cord stimulator in [employer] peripheral stimulator last seen on 3/8/21 who presents for a follow up office visit in regards to chronic lumbosacral back pain secondary to  Lumbar post-laminectomy syndrome and chronic pain syndrome  The patient denies bowel or bladder incontinence or saddle anesthesia  He has failed lumbar rhizotomy in the past   He rates his pain a 5/10 on the numeric pain rating scale    He states his pain is constant nature bothersome the morning and at night  He characterizes the pain as burning, dull aching, sharp, throbbing, cramping pressure like and pins and needles    Current pain medications includes:   Nucynta  mg daily and Norco 5/325 mg twice a day p r n  breakthrough pain, tizanidine 4 mg p r n  and gabapentin as prescribed by Neurology  The patient reports that this regimen is providing 60% pain relief  The patient is reporting no side effects from this pain medication regimen  Pain Contract Signed: 3/8/21  Last Urine Drug Screen: 3/8/21  Last Norco 6/8/21 per pt  I have personally reviewed and/or updated the patient's past medical history, past surgical history, family history, social history, current medications, allergies, and vital signs today  Review of Systems:    Review of Systems   Respiratory: Negative for shortness of breath  Cardiovascular: Negative for chest pain  Gastrointestinal: Negative for constipation, diarrhea, nausea and vomiting  Musculoskeletal: Negative for arthralgias, gait problem, joint swelling and myalgias  Skin: Negative for rash  Neurological: Negative for dizziness, seizures and weakness  All other systems reviewed and are negative  No past medical history on file  No past surgical history on file  No family history on file      Social History     Occupational History    Not on file   Tobacco Use    Smoking status: Current Every Day Smoker     Types: Cigarettes    Smokeless tobacco: Never Used   Substance and Sexual Activity    Alcohol use: No    Drug use: No    Sexual activity: Not on file         Current Outpatient Medications:     amLODIPine (NORVASC) 2 5 mg tablet, Take 2 5 mg by mouth daily, Disp: , Rfl:     atorvastatin (LIPITOR) 80 mg tablet, Take by mouth, Disp: , Rfl:     CHANTIX STARTING MONTH ELÍAS 0 5 MG X 11 & 1 MG X 42 tablet, Take by mouth daily As directed, Disp: , Rfl: 0    gabapentin (NEURONTIN) 600 MG tablet, Take 1 tablet by mouth 2 (two) times a day, Disp: , Rfl:     gabapentin (NEURONTIN) 800 mg tablet, Take 1 tablet by mouth 2 (two) times a day, Disp: , Rfl:     [START ON 8/19/2021] HYDROcodone-acetaminophen (NORCO) 5-325 mg per tablet, Take 1 tablet by mouth 2 (two) times a day as needed for painMax Daily Amount: 2 tablets, Disp: 50 tablet, Rfl: 0    lamoTRIgine (LaMICtal) 100 MG TBDP, Take 100 mg by mouth daily, Disp: , Rfl:     LamoTRIgine 25 (21)-50 (7) MG KIT, USE 1 KIT AS DIRECTED, Disp: , Rfl: 0    lidocaine (LIDODERM) 5 %, Apply 1 patch topically, Disp: , Rfl:     lisinopril-hydrochlorothiazide (PRINZIDE,ZESTORETIC) 20-12 5 MG per tablet, Take 1 tablet by mouth daily, Disp: , Rfl: 3    metFORMIN (GLUCOPHAGE) 1000 MG tablet, Take 1 tablet by mouth 2 (two) times a day, Disp: , Rfl:     naloxone (NARCAN) 4 mg/0 1 mL nasal spray, Administer 1 spray into a nostril   If no response after 2-3 minutes, give another dose in the other nostril using a new spray , Disp: 1 each, Rfl: 1    OXcarbazepine (TRILEPTAL) 600 mg tablet, 2 TABLET(S) BY MOUTH TWO TIMES DAILY, Disp: , Rfl: 2    [START ON 8/22/2021] Tapentadol HCl ER (Nucynta ER) 100 MG TB12, Take 1 tablet (100 mg total) by mouth dailyMax Daily Amount: 100 mg, Disp: 30 tablet, Rfl: 0    tiZANidine (ZANAFLEX) 4 mg tablet, Take 1 tablet (4 mg total) by mouth every 8 (eight) hours as needed for muscle spasms, Disp: 90 tablet, Rfl: 2    diclofenac sodium (VOLTAREN) 1 %, Apply 1 application topically 4 (four) times a day, Disp: , Rfl:     Allergies   Allergen Reactions    Other     Sulfa Antibiotics Edema     Annotation - [id number]: face and hands redness and swelling       Physical Exam:    /94   Pulse 88   Temp 98 8 °F (37 1 °C)   Ht 5' 9" (1 753 m)   Wt 79 4 kg (175 lb)   BMI 25 84 kg/m²     Constitutional:normal, well developed, well nourished, alert, in no distress and non-toxic and no overt pain behavior  Eyes:anicteric  HEENT:grossly intact  Neck:supple, symmetric, trachea midline and no masses   Pulmonary:even and unlabored  Cardiovascular:No edema or pitting edema present  Skin:Normal without rashes or lesions and well hydrated  Psychiatric:Mood and affect appropriate  Neurologic:Cranial Nerves II-XII grossly intact  Musculoskeletal:Slightly antalgic gait but steady without the use of assistive devices      Imaging  No orders to display         No orders of the defined types were placed in this encounter

## 2021-06-08 NOTE — PATIENT INSTRUCTIONS
Opioid Safety   WHAT YOU NEED TO KNOW:   An opioid medicine is used to treat pain  Examples are oxycodone, morphine, fentanyl, or codeine  Pain control and management may help you rest, heal, and return to your daily activities  You and your family will receive information about how to manage your pain at home  The instructions will include what to do if you have side effects as your pain is managed  You will get information on how to handle opioid medicine safely  You will also get suggestions on how to control pain without opioids  It is important to follow all instructions so your pain is managed effectively  DISCHARGE INSTRUCTIONS:   Call your local emergency number (911 in the [place]), or have someone else call if:   · You have a seizure  · You cannot be woken  · You have trouble staying awake and your breathing is slow or shallow  · Your speech is slurred, or you are confused  · You are dizzy or stumble when you walk  Call your doctor, or have someone close to you call if:   · You are extremely drowsy, or you have trouble staying awake or speaking  · You have pale or clammy skin  · You have blue fingernails or lips  · Your heartbeat is slower than normal     · You cannot stop vomiting  · You have questions or concerns about your condition or care  Use opioids safely:   · Take prescribed opioids exactly as directed  Opioids come with directions based on the kind and how it is given  Talk to your healthcare provider or a pharmacist if you have any questions  Do not take more than the recommended amount  Too much can cause a life-threatening overdose  Do not continue to take it after your pain stops  You may develop tolerance  This means you keep needing higher doses to get the same effect  You may also develop opioid use disorder  This means you are not able to control your opioid use  · Do not give opioids to others or take opioids that belong to someone else    The kind or amount one person takes may not be right for another  The person you share them with may also be taking medicines that do not mix with opioids  He or she may drink alcohol or use other drugs that can cause life-threatening problems when mixed with opioids  · Do not mix opioids with other medicines or alcohol  The combination can cause an overdose, or cause you to stop breathing  Alcohol, sleeping pills, and medicines such as antihistamines can make you sleepy  A combination with opioids can lead to a coma  · Do not drive or operate heavy machinery after you use an opioid  You may feel drowsy or have trouble concentrating  You can injure yourself or others if you drive or use heavy machinery when you are not alert  Your provider or pharmacist can tell you how long to wait after a dose before you do these activities  · Talk to your healthcare provider if you have any side effects  Side effects include nausea, sleepiness, itching, and trouble thinking clearly  Your provider may need to make changes to the kind or amount of opioid you are taking  He or she can also help you find ways to prevent or relieve side effects  Manage constipation:  Constipation is the most common side effect of opioid medicine  Constipation is when you have hard, dry bowel movements, or you go longer than usual between bowel movements  Tell your healthcare provider about all changes in your bowel movements while you are taking opioids  He or she may recommend laxative medicine to help you have a bowel movement  He or she may also change the kind of opioid you are taking, or change when you take it  The following are more ways you can prevent or relieve constipation:  · Drink liquids as directed  You may need to drink extra liquids to help soften and move your bowels  Ask how much liquid to drink each day and which liquids are best for you  · Eat high-fiber foods    This may help decrease constipation by adding bulk to your bowel movements  High-fiber foods include fruits, vegetables, whole-grain breads and cereals, and beans  Your healthcare provider or dietitian can help you create a high-fiber meal plan  Your provider may also recommend a fiber supplement if you cannot get enough fiber from food  · Exercise regularly  Regular physical activity can help stimulate your intestines  Walking is a good exercise to prevent or relieve constipation  Ask which exercises are best for you  · Schedule a time each day to have a bowel movement  This may help train your body to have regular bowel movements  Bend forward while you are on the toilet to help move the bowel movement out  Sit on the toilet for at least 10 minutes, even if you do not have a bowel movement  Store opioids safely:   · Store opioids where others cannot easily get them  Keep them in a locked cabinet or secure area  Do not  keep them in a purse or other bag you carry with you  A person may be looking for something else and find the opioids  · Make sure opioids are stored out of the reach of children  A child can easily overdose on opioids  Opioids may look like candy to a small child  The best way to dispose of opioids: The laws vary by country and area  In the [place], the best way is to return the opioids through a take-back program  This program is offered by the [employer]  The following are options for using the program:  · Take the opioids to a DELANO collection site  The site is often a law enforcement center  Call your local law enforcement center for scheduled take-back days in your area  You will be given information on where to go if the collection site is in a different location  · Take the opioids to an approved pharmacy or hospital   A pharmacy or hospital may be set up as a collection site  You will need to ask if it is a DELANO collection site if you were not directed there   A pharmacy or doctor's office may not be able to take back opioids unless it is a DELANO site  · Use a mail-back system  This means you are given containers to put the opioids into  You will then mail them in the containers  · Use a take-back drop box  This is a place to leave the opioids at any time  People and animals will not be able to get into the box  Your local law enforcement agency can tell you where to find a drop box in your area  Other safe ways to dispose of opioids: The medicine may come with disposal instructions  The instructions may vary depending on the brand of medicine you are using  Instructions may come in a Medication Guide, but not every medicine has one  You may instead get instructions from your pharmacy or doctor  Follow instructions carefully  The following are general guidelines to follow:  · Find out if you can flush the opioid  Some opioids can be flushed down the toilet or poured into the sink  You will need to contact authorities in your area to see if this is an option for you  The FDA also offers a list of medicines that are safe to flush down the toilet  You can check the list if you cannot get the information for your local area  · Ask your waste management company about rules for putting opioids in the trash  The company will be able to give you specific directions  Scratch out personal information on the original medicine label so it cannot be read  Then put it in the trash  Do not label the trash or put any information on it about the opioids  It should look like regular household trash so no one is tempted to look for the opioids  Keep the trash out of the reach of children and animals  Always make sure trash is secure  · Talk to officials if you live in a facility  If you live in a nursing home or assisted living center, talk to an official  The person will know the rules for your area  Other ways to manage pain:   · Ask your healthcare provider about non-opioid medicines to control pain  Some medicines may even work better than opioids, depending on the cause of your pain  Nonprescription medicines include NSAIDs (such as ibuprofen) and acetaminophen  Prescription medicines include muscle relaxers, antidepressants, and steroids  · Pain may be managed without any medicines  Some ways to relieve pain include massage, aromatherapy, or meditation  Physical or occupational therapy may also help  For more information:   · Drug Enforcement Administration  [address] [facility] [address]  Phone: 4- 467 - [phone]  Web Address: [facility]/drug_disposal/    · [address] and Drug Administration  [place] , [address]  Phone: 0- 390 - [phone]  Web Address: http://[employer]/  Follow up with your doctor or pain specialist as directed: You may need to have your dose adjusted  Your doctor or pain specialist can also help you find ways to manage pain without opioids  Write down your questions so you remember to ask them during your visits  © Copyright [address] [place] [address] Drive Information is for End User's use only and may not be sold, redistributed or otherwise used for commercial purposes  All illustrations and images included in CareNotes® are the copyrighted property of [employer]  or Bobbi Meza  The above information is an  only  It is not intended as medical advice for individual conditions or treatments  Talk to your doctor, nurse or pharmacist before following any medical regimen to see if it is safe and effective for you

## 2021-06-15 ENCOUNTER — TRANSCRIBE ORDERS (OUTPATIENT)
Dept: ADMINISTRATIVE | Facility: HOSPITAL | Age: 56
End: 2021-06-15

## 2021-06-15 DIAGNOSIS — M54.16 LUMBAR RADICULOPATHY: Primary | ICD-10-CM

## 2021-06-22 ENCOUNTER — HOSPITAL ENCOUNTER (OUTPATIENT)
Dept: RADIOLOGY | Age: 56
Discharge: HOME | End: 2021-06-22
Payer: COMMERCIAL

## 2021-06-22 DIAGNOSIS — M54.16 LUMBAR RADICULOPATHY: ICD-10-CM

## 2021-06-22 PROCEDURE — 72131 CT LUMBAR SPINE W/O DYE: CPT

## 2021-07-28 ENCOUNTER — TELEPHONE (OUTPATIENT)
Dept: PAIN MEDICINE | Facility: MEDICAL CENTER | Age: 56
End: 2021-07-28

## 2021-08-03 ENCOUNTER — OFFICE VISIT (OUTPATIENT)
Dept: PAIN MEDICINE | Facility: CLINIC | Age: 56
End: 2021-08-03
Payer: COMMERCIAL

## 2021-08-03 VITALS
HEART RATE: 81 BPM | DIASTOLIC BLOOD PRESSURE: 91 MMHG | HEIGHT: 69 IN | SYSTOLIC BLOOD PRESSURE: 151 MMHG | WEIGHT: 180 LBS | BODY MASS INDEX: 26.66 KG/M2

## 2021-08-03 DIAGNOSIS — M47.816 LUMBAR SPONDYLOSIS: ICD-10-CM

## 2021-08-03 DIAGNOSIS — M96.1 POST LAMINECTOMY SYNDROME: ICD-10-CM

## 2021-08-03 DIAGNOSIS — M48.061 SPINAL STENOSIS OF LUMBAR REGION, UNSPECIFIED WHETHER NEUROGENIC CLAUDICATION PRESENT: ICD-10-CM

## 2021-08-03 DIAGNOSIS — F11.20 UNCOMPLICATED OPIOID DEPENDENCE (HCC): ICD-10-CM

## 2021-08-03 DIAGNOSIS — M47.14 OTHER SPONDYLOSIS WITH MYELOPATHY, THORACIC REGION: ICD-10-CM

## 2021-08-03 DIAGNOSIS — Z79.891 ENCOUNTER FOR LONG-TERM OPIATE ANALGESIC USE: ICD-10-CM

## 2021-08-03 DIAGNOSIS — G89.4 CHRONIC PAIN SYNDROME: Primary | ICD-10-CM

## 2021-08-03 DIAGNOSIS — M51.36 DEGENERATIVE DISC DISEASE, LUMBAR: ICD-10-CM

## 2021-08-03 DIAGNOSIS — M54.12 CERVICAL RADICULOPATHY: ICD-10-CM

## 2021-08-03 DIAGNOSIS — M47.812 CERVICAL SPONDYLOSIS WITHOUT MYELOPATHY: ICD-10-CM

## 2021-08-03 DIAGNOSIS — M54.16 LUMBAR RADICULOPATHY: ICD-10-CM

## 2021-08-03 DIAGNOSIS — R20.0 RIGHT ARM NUMBNESS: ICD-10-CM

## 2021-08-03 DIAGNOSIS — G89.4 PAIN SYNDROME, CHRONIC: ICD-10-CM

## 2021-08-03 PROCEDURE — 3008F BODY MASS INDEX DOCD: CPT | Performed by: NURSE PRACTITIONER

## 2021-08-03 PROCEDURE — 80305 DRUG TEST PRSMV DIR OPT OBS: CPT | Performed by: NURSE PRACTITIONER

## 2021-08-03 PROCEDURE — 99214 OFFICE O/P EST MOD 30 MIN: CPT | Performed by: NURSE PRACTITIONER

## 2021-08-03 RX ORDER — HYDROCODONE BITARTRATE AND ACETAMINOPHEN 5; 325 MG/1; MG/1
1 TABLET ORAL 2 TIMES DAILY PRN
Qty: 50 TABLET | Refills: 0 | Status: SHIPPED | OUTPATIENT
Start: 2021-09-26 | End: 2021-08-03 | Stop reason: SDUPTHER

## 2021-08-03 RX ORDER — TAPENTADOL HYDROCHLORIDE 100 MG/1
100 TABLET, FILM COATED, EXTENDED RELEASE ORAL DAILY
Qty: 30 TABLET | Refills: 0 | Status: SHIPPED | OUTPATIENT
Start: 2021-10-25 | End: 2021-10-25 | Stop reason: SDUPTHER

## 2021-08-03 RX ORDER — HYDROCODONE BITARTRATE AND ACETAMINOPHEN 5; 325 MG/1; MG/1
1 TABLET ORAL 2 TIMES DAILY PRN
Qty: 50 TABLET | Refills: 0 | Status: SHIPPED | OUTPATIENT
Start: 2021-10-25 | End: 2021-10-25 | Stop reason: SDUPTHER

## 2021-08-03 NOTE — PROGRESS NOTES
Assessment:  1  Chronic pain syndrome    2  Cervical radiculopathy    3  Lumbar radiculopathy    4  Other spondylosis with myelopathy, thoracic region    5  Degenerative disc disease, lumbar    6  Lumbar spondylosis    7  Cervical spondylosis without myelopathy    8  Uncomplicated opioid dependence ([address] )    9  Post laminectomy syndrome    10  Encounter for long-term opiate analgesic use    11  Spinal stenosis of lumbar region, unspecified whether neurogenic claudication present    12  Right arm numbness    13  Pain syndrome, chronic        Plan:  1  Patient may continue Nucynta  mg daily #30 tablets for 30 days  Per PDMP, patient still has 2 prescriptions on file at the pharmacy from last office visit therefore I will provide him with 1 more month's worth of prescriptions today with a do not fill date of October 25, 2021  patient may continue Norco 5/325mg 1 tablet twice a day as needed for pain #50 tablets for 30 days as prescribed  The patient's still has a prescription on file at the pharmacy therefore I will provide him with 2 more month's prescriptions with do not fill dates of September 26, 2021 and October 25, 2021    [place] Prescription Drug Monitoring Program report was reviewed and was appropriate     A urine drug screen was collected at today's office visit as part of our medication management protocol  The point of care testing results were appropriate for what was being prescribed  The specimen will be sent for confirmatory testing  The drug screen is medically necessary because the patient is either dependent on opioid medication or is being considered for opioid medication therapy and the results could impact ongoing or future treatment  The drug screen is to evaluate for the presences or absence of prescribed, non-prescribed, and/or illicit drugs/substances  There are risks associated with opioid medications, including dependence, addiction and tolerance   The patient understands and agrees to use these medications only as prescribed  Potential side effects of the medications include, but are not limited to, constipation, drowsiness, addiction, impaired judgment and risk of fatal overdose if not taken as prescribed  The patient was warned against driving while taking sedation medications  Sharing medications is a felony  At this point in time, the patient is showing no signs of addiction, abuse, diversion or suicidal ideation  The patient was selected for a random pill count at todays office visit  The medication was available for the count and the amount present was found to be appropriate according to the date(s) filled and the medication prescription instructions noted on the prescription container  The medication was returned to the patient and the documentation form can be found in the patient's chart  2   Patient will continue to follow [employer] an [employer] for periodically reprogramming sessions of his spinal cord stimulator device is as needed  3  I will order an EMG of the right upper extremity as he complains of intermittent numbness  4  Patient may continue gabapentin as prescribed by Neurology  5  Patient may continue tizanidine as prescribed  6  Patient will follow-up in 3 months or sooner if needed     M*Modal software was used to dictate this note  It may contain errors with dictating incorrect words or incorrect spelling  Please contact the provider directly with any questions  History of Present Illness: The patient is a 54 y o  male with a history of T12 decompression and fusion with instrumentation status post [place] spinal cord stimulator and [employer] peripheral stimulator last seen on 6/8/21 who presents for a follow up office visit in regards to chronic low back pain secondary to  Lumbar post-laminectomy syndrome and chronic pain syndrome  The patient denies bowel or bladder incontinence or saddle anesthesia    The patient also has a secondary complaint of right arm numbness  He denies any pain into the right upper extremity  He denies any associated neck pain  He states it depends on the positioning of his arm that his arm will go numb from his shoulder to his hand  It does happen on a daily basis  Otherwise he rates his low back pain a 5/10 on the numeric pain rating scale  He states his pain is constant nature and bothersome the morning and at night  He characterizes the pain as burning, dull aching, sharp, throbbing, cramping, pressure like, shooting, numbness and pins and needles    Current pain medications includes: Nucynta  mg daily, Norco 5/325 mg b i d  p r n  breakthrough pain, tizanidine 4 mg p r n  myofascial pain and gabapentin as prescribed by Neurology    The patient reports that this regimen is providing 60% pain relief  The patient is reporting no side effects from this pain medication regimen  Pain Contract Signed: 3/8/21  Last Urine Drug Screen: 8/3/21  Last Norco per pt 8/3/21    I have personally reviewed and/or updated the patient's past medical history, past surgical history, family history, social history, current medications, allergies, and vital signs today  Review of Systems:    Review of Systems   Constitutional: Negative for fever and unexpected weight change  HENT: Negative for trouble swallowing  Eyes: Negative for visual disturbance  Respiratory: Negative for shortness of breath and wheezing  Cardiovascular: Negative for chest pain and palpitations  Gastrointestinal: Negative for constipation, diarrhea, nausea and vomiting  Endocrine: Negative for cold intolerance, heat intolerance and polydipsia  Genitourinary: Negative for difficulty urinating and frequency  Musculoskeletal: Negative for arthralgias, gait problem, joint swelling and myalgias  Skin: Negative for rash  Neurological: Negative for dizziness, seizures, syncope, weakness and headaches     Hematological: Does not bruise/bleed easily  Psychiatric/Behavioral: Negative for dysphoric mood  All other systems reviewed and are negative  No past medical history on file  No past surgical history on file  No family history on file  Social History     Occupational History    Not on file   Tobacco Use    Smoking status: Current Every Day Smoker     Types: Cigarettes    Smokeless tobacco: Never Used   Substance and Sexual Activity    Alcohol use: No    Drug use: No    Sexual activity: Not on file         Current Outpatient Medications:     amLODIPine (NORVASC) 2 5 mg tablet, Take 2 5 mg by mouth daily, Disp: , Rfl:     atorvastatin (LIPITOR) 80 mg tablet, Take by mouth, Disp: , Rfl:     CHANTIX STARTING MONTH ELÍAS 0 5 MG X 11 & 1 MG X 42 tablet, Take by mouth daily As directed, Disp: , Rfl: 0    gabapentin (NEURONTIN) 600 MG tablet, Take 1 tablet by mouth 2 (two) times a day, Disp: , Rfl:     gabapentin (NEURONTIN) 800 mg tablet, Take 1 tablet by mouth 2 (two) times a day, Disp: , Rfl:     [START ON 10/25/2021] HYDROcodone-acetaminophen (NORCO) 5-325 mg per tablet, Take 1 tablet by mouth 2 (two) times a day as needed for painMax Daily Amount: 2 tablets, Disp: 50 tablet, Rfl: 0    lamoTRIgine (LaMICtal) 100 MG TBDP, Take 100 mg by mouth daily, Disp: , Rfl:     LamoTRIgine 25 (21)-50 (7) MG KIT, USE 1 KIT AS DIRECTED, Disp: , Rfl: 0    lidocaine (LIDODERM) 5 %, Apply 1 patch topically, Disp: , Rfl:     lisinopril-hydrochlorothiazide (PRINZIDE,ZESTORETIC) 20-12 5 MG per tablet, Take 1 tablet by mouth daily, Disp: , Rfl: 3    metFORMIN (GLUCOPHAGE) 1000 MG tablet, Take 1 tablet by mouth 2 (two) times a day, Disp: , Rfl:     naloxone (NARCAN) 4 mg/0 1 mL nasal spray, Administer 1 spray into a nostril   If no response after 2-3 minutes, give another dose in the other nostril using a new spray , Disp: 1 each, Rfl: 1    OXcarbazepine (TRILEPTAL) 600 mg tablet, 2 TABLET(S) BY MOUTH TWO TIMES DAILY, Disp: , Rfl: 2    [START ON 10/25/2021] Tapentadol HCl ER (Nucynta ER) 100 MG TB12, Take 1 tablet (100 mg total) by mouth dailyMax Daily Amount: 100 mg, Disp: 30 tablet, Rfl: 0    tiZANidine (ZANAFLEX) 4 mg tablet, Take 1 tablet (4 mg total) by mouth every 8 (eight) hours as needed for muscle spasms, Disp: 90 tablet, Rfl: 2    diclofenac sodium (VOLTAREN) 1 %, Apply 1 application topically 4 (four) times a day, Disp: , Rfl:     Allergies   Allergen Reactions    Other     Sulfa Antibiotics Edema     Annotation - [id number]: face and hands redness and swelling       Physical Exam:    /91   Pulse 81   Ht 5' 9" (1 753 m)   Wt 81 6 kg (180 lb)   BMI 26 58 kg/m²     Constitutional:normal, well developed, well nourished, alert, in no distress and non-toxic and no overt pain behavior  Eyes:anicteric  HEENT:grossly intact  Neck:supple, symmetric, trachea midline and no masses   Pulmonary:even and unlabored  Cardiovascular:No edema or pitting edema present  Skin:Normal without rashes or lesions and well hydrated  Psychiatric:Mood and affect appropriate  Neurologic:Cranial Nerves II-XII grossly intact  Musculoskeletal:normal gait  Full range of motion all planes of the cervical spine  Full range of motion all planes of the right shoulder  Negative Spurling's         Imaging  No orders to display         Orders Placed This Encounter   Procedures    MM ALL_Prescribed Meds and Special Instructions    MM DT_Alprazolam Definitive Test    MM DT_Amphetamine Definitive Test    MM DT_Aripiprazole Definitive Test    MM DT_Bath Salts Definitive Test    MM DT_Buprenorphine Definitive Test    MM DT_Butalbital Definitive Test    MM DT_Clonazepam Definitive Test    MM DT_Clozapine Definitive Test    MM DT_Cocaine Definitive Test    MM DT_Codeine Definitive Test    MM DT_Desipramine Definitive Test    MM DT_Dextromethorphan Definitive Test    MM Diazepam Definitive Test    MM DT_Ethyl Glucuronide/Ethyl Sulfate Definitive Test    MM DT_Fentanyl Definitive Test    MM DT_Haloperidol Definitive Test    MM DT_Heroin Definitive Test    MM DT_Hydrocodone Definitive Test    MM DT_Hydromorphone Definitive Test    MM DT_Imipramine Definitive Test    MM DT_Kratom Definitive Test    MM DT_Levorphanol Definitive Test    MM Lorazepam Definitive Test    MM DT_MDMA Definitive Test    MM DT_Meperidine Definitive Test    MM DT_Methadone Definitive Test    MM DT_Methamphetamine Definitive Test    MM DT_Morphine Definitive Test    MM DT_Olanzapine Definitive Test    MM DT_Oxazepam Definitive Test    MM DT_Oxycodone Definitive Test    MM DT_Oxymorphone Definitive Test    MM DT_Phencyclidine Definitive Test    MM DT_Phenobarbital Definitive Test    MM DT_Phentermine Definitive Test    MM DT_Quetiapine Definitive Test    MM DT_Risperidone Definitive Test    MM DT_Secobarbital Definitive Test    MM DT_Spice Definitive Test    MM DT_Tapentadol Definitive Test    MM DT_Temazapam Definitive Test    MM DT_THC Definitive Test    MM DT_Tramadol Definitive Test    MM DT_Methylphenidate Definitive Test    EMG 1 Limb

## 2021-08-03 NOTE — PATIENT INSTRUCTIONS
Electromyography   AMBULATORY CARE:   What you need to know about electromyography:  Electromyography (EMG) is a test that measures the electrical activity of your muscles  Your nerves send signals to your muscles to help them move  An EMG will tell your healthcare provider how well your muscles and nerves work together  A nerve conduction study (NCS) is usually done at the same time as an EMG  An NCS measures how fast electrical activity travels through your nerve to your muscle  Together these tests can help diagnose conditions such as muscular dystrophy, myasthenia gravis, and nerve disorders  How to prepare for electromyography:  Your healthcare provider will talk to you about how to prepare for your test  Tell your healthcare provider if you have a pacemaker  Your healthcare provider will tell you what medicines to take or not take on the day of your test  You may need to stop taking blood thinners, NSAIDs, and aspirin 24 hours before the test  Do not have caffeine or smoke for 2 to 3 hours before the test  Do not apply lotions or creams to your skin on the day of the test  Wear loose-fitting clothing to the test  You may be given medicine to help you relax before the test    What will happen during electromyography:   · Your healthcare provider will attach sticky pads to your arm or leg  The provider will also insert 1 or more needles through your skin and into your muscle  You may feel discomfort or pain when the needle is inserted  The needle and sticky pads will be attached to wires and monitors  The monitors will record the electrical activity of your muscle  · A small electrical shock will be sent through the sticky pads to your muscle  This will make your arm or leg twitch  You may feel a sting during the shock  You may be asked to lift or bend your arm or leg  When the test is finished your healthcare provider will remove the needle and wires   The provider may hold pressure over your wound and cover it with a small bandage  What will happen after electromyography: You may have bruising or pain where the needle was inserted  This should get better in a few days  Risks of electromyography:  Your nerves may be injured during the test  You may get an infection  You may develop soreness, bruises, or bleeding where the needles were put into your skin  Contact your healthcare provider if:   · You have a fever  · Your skin where the needles were inserted is red, swollen, or draining pus  · You have numbness or have trouble moving your arm or leg  · You have questions or concerns about your condition or care  Medicines: You may need the following:  · Acetaminophen  decreases pain and fever  It is available without a doctor's order  Ask how much to take and how often to take it  Follow directions  Acetaminophen can cause liver damage if not taken correctly  · Take your medicine as directed  Contact your healthcare provider if you think your medicine is not helping or if you have side effects  Tell him or her if you are allergic to any medicine  Keep a list of the medicines, vitamins, and herbs you take  Include the amounts, and when and why you take them  Bring the list or the pill bottles to follow-up visits  Carry your medicine list with you in case of an emergency  Care for your wound as directed:  Remove the bandage in 12 hours or as directed  Carefully wash around the wound with soap and water  Dry the area and put on new, clean bandages as directed  Change your bandages when they get wet or dirty  Apply ice:  Apply ice on your wound for 15 to 20 minutes every hour or as directed  Use an ice pack, or put crushed ice in a plastic bag  Cover it with a towel  Ice helps prevent tissue damage and decreases swelling and pain  Follow up with your healthcare provider as directed:  Write down your questions so you remember to ask them during your visits     © [address] Information is for End User's use only and may not be sold, redistributed or otherwise used for commercial purposes  All illustrations and images included in CareNotes® are the copyrighted property of A D A M [employer]  or Bobbi Meza  The above information is an  only  It is not intended as medical advice for individual conditions or treatments  Talk to your doctor, nurse or pharmacist before following any medical regimen to see if it is safe and effective for you  Opioid Safety   WHAT YOU NEED TO KNOW:   An opioid medicine is used to treat pain  Examples are oxycodone, morphine, fentanyl, or codeine  Pain control and management may help you rest, heal, and return to your daily activities  You and your family will receive information about how to manage your pain at home  The instructions will include what to do if you have side effects as your pain is managed  You will get information on how to handle opioid medicine safely  You will also get suggestions on how to control pain without opioids  It is important to follow all instructions so your pain is managed effectively  DISCHARGE INSTRUCTIONS:   Call your local emergency number (911 in the [place]), or have someone else call if:   · You have a seizure  · You cannot be woken  · You have trouble staying awake and your breathing is slow or shallow  · Your speech is slurred, or you are confused  · You are dizzy or stumble when you walk  Call your doctor, or have someone close to you call if:   · You are extremely drowsy, or you have trouble staying awake or speaking  · You have pale or clammy skin  · You have blue fingernails or lips  · Your heartbeat is slower than normal     · You cannot stop vomiting  · You have questions or concerns about your condition or care  Use opioids safely:   · Take prescribed opioids exactly as directed  Opioids come with directions based on the kind and how it is given   Talk to your healthcare provider or a pharmacist if you have any questions  Do not take more than the recommended amount  Too much can cause a life-threatening overdose  Do not continue to take it after your pain stops  You may develop tolerance  This means you keep needing higher doses to get the same effect  You may also develop opioid use disorder  This means you are not able to control your opioid use  · Do not give opioids to others or take opioids that belong to someone else  The kind or amount one person takes may not be right for another  The person you share them with may also be taking medicines that do not mix with opioids  He or she may drink alcohol or use other drugs that can cause life-threatening problems when mixed with opioids  · Do not mix opioids with other medicines or alcohol  The combination can cause an overdose, or cause you to stop breathing  Alcohol, sleeping pills, and medicines such as antihistamines can make you sleepy  A combination with opioids can lead to a coma  · Do not drive or operate heavy machinery after you use an opioid  You may feel drowsy or have trouble concentrating  You can injure yourself or others if you drive or use heavy machinery when you are not alert  Your provider or pharmacist can tell you how long to wait after a dose before you do these activities  · Talk to your healthcare provider if you have any side effects  Side effects include nausea, sleepiness, itching, and trouble thinking clearly  Your provider may need to make changes to the kind or amount of opioid you are taking  He or she can also help you find ways to prevent or relieve side effects  Manage constipation:  Constipation is the most common side effect of opioid medicine  Constipation is when you have hard, dry bowel movements, or you go longer than usual between bowel movements  Tell your healthcare provider about all changes in your bowel movements while you are taking opioids   He or she may recommend laxative medicine to help you have a bowel movement  He or she may also change the kind of opioid you are taking, or change when you take it  The following are more ways you can prevent or relieve constipation:  · Drink liquids as directed  You may need to drink extra liquids to help soften and move your bowels  Ask how much liquid to drink each day and which liquids are best for you  · Eat high-fiber foods  This may help decrease constipation by adding bulk to your bowel movements  High-fiber foods include fruits, vegetables, whole-grain breads and cereals, and beans  Your healthcare provider or dietitian can help you create a high-fiber meal plan  Your provider may also recommend a fiber supplement if you cannot get enough fiber from food  · Exercise regularly  Regular physical activity can help stimulate your intestines  Walking is a good exercise to prevent or relieve constipation  Ask which exercises are best for you  · Schedule a time each day to have a bowel movement  This may help train your body to have regular bowel movements  Bend forward while you are on the toilet to help move the bowel movement out  Sit on the toilet for at least 10 minutes, even if you do not have a bowel movement  Store opioids safely:   · Store opioids where others cannot easily get them  Keep them in a locked cabinet or secure area  Do not  keep them in a purse or other bag you carry with you  A person may be looking for something else and find the opioids  · Make sure opioids are stored out of the reach of children  A child can easily overdose on opioids  Opioids may look like candy to a small child  The best way to dispose of opioids: The laws vary by country and area  In the [place], the best way is to return the opioids through a take-back program  This program is offered by the [employer]   The following are options for using the program:  · Take the opioids to a DELANO collection site  The site is often a law enforcement center  Call your local law enforcement center for scheduled take-back days in your area  You will be given information on where to go if the collection site is in a different location  · Take the opioids to an approved pharmacy or hospital   A pharmacy or hospital may be set up as a collection site  You will need to ask if it is a DELANO collection site if you were not directed there  A pharmacy or doctor's office may not be able to take back opioids unless it is a DELANO site  · Use a mail-back system  This means you are given containers to put the opioids into  You will then mail them in the containers  · Use a take-back drop box  This is a place to leave the opioids at any time  People and animals will not be able to get into the box  Your local law enforcement agency can tell you where to find a drop box in your area  Other safe ways to dispose of opioids: The medicine may come with disposal instructions  The instructions may vary depending on the brand of medicine you are using  Instructions may come in a Medication Guide, but not every medicine has one  You may instead get instructions from your pharmacy or doctor  Follow instructions carefully  The following are general guidelines to follow:  · Find out if you can flush the opioid  Some opioids can be flushed down the toilet or poured into the sink  You will need to contact authorities in your area to see if this is an option for you  The FDA also offers a list of medicines that are safe to flush down the toilet  You can check the list if you cannot get the information for your local area  · Ask your waste management company about rules for putting opioids in the trash  The company will be able to give you specific directions  Scratch out personal information on the original medicine label so it cannot be read  Then put it in the trash   Do not label the trash or put any information on it about the opioids  It should look like regular household trash so no one is tempted to look for the opioids  Keep the trash out of the reach of children and animals  Always make sure trash is secure  · Talk to officials if you live in a facility  If you live in a nursing home or assisted living center, talk to an official  The person will know the rules for your area  Other ways to manage pain:   · Ask your healthcare provider about non-opioid medicines to control pain  Some medicines may even work better than opioids, depending on the cause of your pain  Nonprescription medicines include NSAIDs (such as ibuprofen) and acetaminophen  Prescription medicines include muscle relaxers, antidepressants, and steroids  · Pain may be managed without any medicines  Some ways to relieve pain include massage, aromatherapy, or meditation  Physical or occupational therapy may also help  For more information:   · Drug Enforcement Administration  [facility] [address]  Phone: [phone]  Web Address: [email or web address]    · [address]  and Drug Administration  [facility] , [address] [place] [address]  Phone: [phone]  Web Address: [email or web address]/  Follow up with your doctor or pain specialist as directed: You may need to have your dose adjusted  Your doctor or pain specialist can also help you find ways to manage pain without opioids  Write down your questions so you remember to ask them during your visits  © Copyright [employer] 2021 Information is for End User's use only and may not be sold, redistributed or otherwise used for commercial purposes  All illustrations and images included in CareNotes® are the copyrighted property of [employer]  or Bobbi Meza  The above information is an  only  It is not intended as medical advice for individual conditions or treatments   Talk to your doctor, nurse or pharmacist before following any medical regimen to see if it is safe and effective for you

## 2021-08-05 LAB
6MAM UR QL CFM: NEGATIVE NG/ML
7AMINOCLONAZEPAM UR QL CFM: NEGATIVE NG/ML
A-OH ALPRAZ UR QL CFM: NEGATIVE NG/ML
AMPHET UR QL CFM: NEGATIVE NG/ML
AMPHET UR QL CFM: NEGATIVE NG/ML
BUPRENORPHINE UR QL CFM: NEGATIVE NG/ML
BUTALBITAL UR QL CFM: NEGATIVE NG/ML
BZE UR QL CFM: NEGATIVE NG/ML
CODEINE UR QL CFM: NEGATIVE NG/ML
DESIPRAMINE UR QL CFM: NEGATIVE NG/ML
DESIPRAMINE UR QL CFM: NEGATIVE NG/ML
EDDP UR QL CFM: NEGATIVE NG/ML
ETHYL GLUCURONIDE UR QL CFM: ABNORMAL NG/ML
ETHYL SULFATE UR QL SCN: NEGATIVE NG/ML
FENTANYL UR QL CFM: NEGATIVE NG/ML
GLIADIN IGG SER IA-ACNC: NEGATIVE NG/ML
GLUCOSE 30M P 50 G LAC PO SERPL-MCNC: NEGATIVE NG/ML
HYDROCODONE UR QL CFM: NORMAL NG/ML
HYDROCODONE UR QL CFM: NORMAL NG/ML
HYDROMORPHONE UR QL CFM: NORMAL NG/ML
IMIPRAMINE UR QL CFM: NEGATIVE NG/ML
LORAZEPAM UR QL CFM: NEGATIVE NG/ML
MDMA UR QL CFM: NEGATIVE NG/ML
ME-PHENIDATE UR QL CFM: NEGATIVE NG/ML
MEPERIDINE UR QL CFM: NEGATIVE NG/ML
MEPHEDRONE UR QL CFM: NEGATIVE NG/ML
METHADONE UR QL CFM: NEGATIVE NG/ML
METHAMPHET UR QL CFM: NEGATIVE NG/ML
MORPHINE UR QL CFM: NEGATIVE NG/ML
MORPHINE UR QL CFM: NEGATIVE NG/ML
NORBUPRENORPHINE UR QL CFM: NEGATIVE NG/ML
NORDIAZEPAM UR QL CFM: NEGATIVE NG/ML
NORFENTANYL UR QL CFM: NEGATIVE NG/ML
NORHYDROCODONE UR QL CFM: NORMAL NG/ML
NORHYDROCODONE UR QL CFM: NORMAL NG/ML
NORMEPERIDINE UR QL CFM: NEGATIVE NG/ML
NOROXYCODONE UR QL CFM: NEGATIVE NG/ML
OLANZAPINE QUANTIFICATION: NEGATIVE NG/ML
OPC-3373 QUANTIFICATION: NEGATIVE
OXAZEPAM UR QL CFM: NEGATIVE NG/ML
OXYCODONE UR QL CFM: NEGATIVE NG/ML
OXYMORPHONE UR QL CFM: NEGATIVE NG/ML
OXYMORPHONE UR QL CFM: NEGATIVE NG/ML
PCP UR QL CFM: NEGATIVE NG/ML
PHENOBARB UR QL CFM: NEGATIVE NG/ML
SECOBARBITAL UR QL CFM: NEGATIVE NG/ML
SL AMB 3-METHYL-FENTANYL QUANTIFICATION: NORMAL NG/ML
SL AMB 4-ANPP QUANTIFICATION: NORMAL NG/ML
SL AMB 4-FIBF QUANTIFICATION: NORMAL NG/ML
SL AMB 5F-ADB-M7 METABOLITE QUANTIFICATION: NEGATIVE NG/ML
SL AMB 7-OH-MITRAGYNINE (KRATOM ALKALOID) QUANTIFICATION: NEGATIVE NG/ML
SL AMB AB-FUBINACA-M3 METABOLITE QUANTIFICATION: NEGATIVE NG/ML
SL AMB ACETYL FENTANYL QUANTIFICATION: NORMAL NG/ML
SL AMB ACETYL NORFENTANYL QUANTIFICATION: NORMAL NG/ML
SL AMB ACRYL FENTANYL QUANTIFICATION: NORMAL NG/ML
SL AMB BATH SALTS: NEGATIVE NG/ML
SL AMB BUTRYL FENTANYL QUANTIFICATION: NORMAL NG/ML
SL AMB CARFENTANIL QUANTIFICATION: NORMAL NG/ML
SL AMB CLOZAPINE QUANTIFICATION: NEGATIVE NG/ML
SL AMB CTHC (MARIJUANA METABOLITE) QUANTIFICATION: NEGATIVE NG/ML
SL AMB CYCLOPROPYL FENTANYL QUANTIFICATION: NORMAL NG/ML
SL AMB DEXTROMETHORPHAN QUANTIFICATION: NEGATIVE NG/ML
SL AMB DEXTRORPHAN (DEXTROMETHORPHAN METABOLITE) QUANT: NEGATIVE NG/ML
SL AMB DEXTRORPHAN (DEXTROMETHORPHAN METABOLITE) QUANT: NEGATIVE NG/ML
SL AMB FURANYL FENTANYL QUANTIFICATION: NORMAL NG/ML
SL AMB HALOPERIDOL  QUANTIFICATION: NEGATIVE NG/ML
SL AMB HALOPERIDOL METABOLITE QUANTIFICATION: NEGATIVE NG/ML
SL AMB HYDROXYRISPERIDONE QUANTIFICATION: NEGATIVE NG/ML
SL AMB JWH018 METABOLITE QUANTIFICATION: NEGATIVE NG/ML
SL AMB JWH073 METABOLITE QUANTIFICATION: NEGATIVE NG/ML
SL AMB MDMB-FUBINACA-M1 METABOLITE QUANTIFICATION: NEGATIVE NG/ML
SL AMB METHOXYACETYL FENTANYL QUANTIFICATION: NORMAL NG/ML
SL AMB METHYLONE QUANTIFICATION: NEGATIVE NG/ML
SL AMB N-DESMETHYL U-47700 QUANTIFICATION: NORMAL NG/ML
SL AMB N-DESMETHYL-TRAMADOL QUANTIFICATION: NEGATIVE NG/ML
SL AMB N-DESMETHYLCLOZAPINE QUANTIFICATION: NEGATIVE NG/ML
SL AMB NORQUETIAPINE QUANTIFICATION: NEGATIVE NG/ML
SL AMB PHENTERMINE QUANTIFICATION: NEGATIVE NG/ML
SL AMB QUETIAPINE QUANTIFICATION: NEGATIVE NG/ML
SL AMB RCS4 METABOLITE QUANTIFICATION: NEGATIVE NG/ML
SL AMB RISPERIDONE QUANTIFICATION: NEGATIVE NG/ML
SL AMB RITALINIC ACID QUANTIFICATION: NEGATIVE NG/ML
SL AMB U-47700 QUANTIFICATION: NORMAL NG/ML
SPECIMEN DRAWN SERPL: NEGATIVE NG/ML
TAPENTADOL UR QL CFM: NORMAL NG/ML
TEMAZEPAM UR QL CFM: NEGATIVE NG/ML
TEMAZEPAM UR QL CFM: NEGATIVE NG/ML
TRAMADOL UR QL CFM: NEGATIVE NG/ML
URATE/CREAT 24H UR: NEGATIVE NG/ML

## 2021-10-25 ENCOUNTER — OFFICE VISIT (OUTPATIENT)
Dept: PAIN MEDICINE | Facility: CLINIC | Age: 56
End: 2021-10-25
Payer: COMMERCIAL

## 2021-10-25 VITALS
BODY MASS INDEX: 26.51 KG/M2 | DIASTOLIC BLOOD PRESSURE: 73 MMHG | SYSTOLIC BLOOD PRESSURE: 115 MMHG | WEIGHT: 179 LBS | HEART RATE: 98 BPM | HEIGHT: 69 IN

## 2021-10-25 DIAGNOSIS — M48.061 SPINAL STENOSIS OF LUMBAR REGION, UNSPECIFIED WHETHER NEUROGENIC CLAUDICATION PRESENT: ICD-10-CM

## 2021-10-25 DIAGNOSIS — G89.4 PAIN SYNDROME, CHRONIC: Primary | ICD-10-CM

## 2021-10-25 DIAGNOSIS — M47.812 CERVICAL SPONDYLOSIS WITHOUT MYELOPATHY: ICD-10-CM

## 2021-10-25 DIAGNOSIS — F11.20 UNCOMPLICATED OPIOID DEPENDENCE (HCC): ICD-10-CM

## 2021-10-25 DIAGNOSIS — G89.4 CHRONIC PAIN SYNDROME: ICD-10-CM

## 2021-10-25 DIAGNOSIS — M96.1 POST LAMINECTOMY SYNDROME: ICD-10-CM

## 2021-10-25 DIAGNOSIS — M54.16 LUMBAR RADICULOPATHY: ICD-10-CM

## 2021-10-25 DIAGNOSIS — M47.816 LUMBAR SPONDYLOSIS: ICD-10-CM

## 2021-10-25 DIAGNOSIS — Z79.891 ENCOUNTER FOR LONG-TERM OPIATE ANALGESIC USE: ICD-10-CM

## 2021-10-25 PROCEDURE — 99214 OFFICE O/P EST MOD 30 MIN: CPT | Performed by: NURSE PRACTITIONER

## 2021-10-25 RX ORDER — MELOXICAM 15 MG/1
TABLET ORAL
COMMUNITY
Start: 2021-10-04

## 2021-10-25 RX ORDER — HYDROCODONE BITARTRATE AND ACETAMINOPHEN 5; 325 MG/1; MG/1
1 TABLET ORAL 2 TIMES DAILY PRN
Qty: 50 TABLET | Refills: 0 | Status: SHIPPED | OUTPATIENT
Start: 2021-11-29 | End: 2021-10-25 | Stop reason: SDUPTHER

## 2021-10-25 RX ORDER — HYDROCODONE BITARTRATE AND ACETAMINOPHEN 5; 325 MG/1; MG/1
1 TABLET ORAL 2 TIMES DAILY PRN
Qty: 50 TABLET | Refills: 0 | Status: SHIPPED | OUTPATIENT
Start: 2021-12-28 | End: 2022-01-17 | Stop reason: SDUPTHER

## 2021-10-25 RX ORDER — TAPENTADOL HYDROCHLORIDE 100 MG/1
100 TABLET, FILM COATED, EXTENDED RELEASE ORAL DAILY
Qty: 30 TABLET | Refills: 0 | Status: SHIPPED | OUTPATIENT
Start: 2021-12-21 | End: 2022-01-17 | Stop reason: SDUPTHER

## 2021-10-25 RX ORDER — TAPENTADOL HYDROCHLORIDE 100 MG/1
100 TABLET, FILM COATED, EXTENDED RELEASE ORAL DAILY
Qty: 30 TABLET | Refills: 0 | Status: SHIPPED | OUTPATIENT
Start: 2021-11-22 | End: 2021-10-25 | Stop reason: SDUPTHER

## 2021-11-08 ENCOUNTER — TELEPHONE (OUTPATIENT)
Dept: PAIN MEDICINE | Facility: CLINIC | Age: 56
End: 2021-11-08

## 2022-01-17 ENCOUNTER — OFFICE VISIT (OUTPATIENT)
Dept: PAIN MEDICINE | Facility: CLINIC | Age: 57
End: 2022-01-17
Payer: COMMERCIAL

## 2022-01-17 VITALS — HEIGHT: 69 IN | WEIGHT: 178 LBS | BODY MASS INDEX: 26.36 KG/M2

## 2022-01-17 DIAGNOSIS — M96.1 POST LAMINECTOMY SYNDROME: ICD-10-CM

## 2022-01-17 DIAGNOSIS — M79.18 MYOFASCIAL PAIN SYNDROME: ICD-10-CM

## 2022-01-17 DIAGNOSIS — M48.061 SPINAL STENOSIS OF LUMBAR REGION, UNSPECIFIED WHETHER NEUROGENIC CLAUDICATION PRESENT: ICD-10-CM

## 2022-01-17 DIAGNOSIS — Z79.891 LONG-TERM CURRENT USE OF OPIATE ANALGESIC: ICD-10-CM

## 2022-01-17 DIAGNOSIS — G89.4 CHRONIC PAIN SYNDROME: Primary | ICD-10-CM

## 2022-01-17 DIAGNOSIS — F11.20 UNCOMPLICATED OPIOID DEPENDENCE (HCC): ICD-10-CM

## 2022-01-17 DIAGNOSIS — M54.16 LUMBAR RADICULOPATHY: ICD-10-CM

## 2022-01-17 PROCEDURE — 80305 DRUG TEST PRSMV DIR OPT OBS: CPT | Performed by: NURSE PRACTITIONER

## 2022-01-17 PROCEDURE — 99214 OFFICE O/P EST MOD 30 MIN: CPT | Performed by: NURSE PRACTITIONER

## 2022-01-17 RX ORDER — BACLOFEN 10 MG/1
10 TABLET ORAL AS NEEDED
COMMUNITY

## 2022-01-17 RX ORDER — HYDROCODONE BITARTRATE AND ACETAMINOPHEN 5; 325 MG/1; MG/1
TABLET ORAL
Qty: 50 TABLET | Refills: 0 | Status: SHIPPED | OUTPATIENT
Start: 2022-01-17 | End: 2022-04-11 | Stop reason: SDUPTHER

## 2022-01-17 RX ORDER — TAPENTADOL HYDROCHLORIDE 100 MG/1
TABLET, FILM COATED, EXTENDED RELEASE ORAL
Qty: 30 TABLET | Refills: 0 | Status: SHIPPED | OUTPATIENT
Start: 2022-01-17

## 2022-01-17 RX ORDER — TIZANIDINE 4 MG/1
4 TABLET ORAL EVERY 8 HOURS PRN
Qty: 90 TABLET | Refills: 2 | Status: CANCELLED | OUTPATIENT
Start: 2022-01-17

## 2022-01-17 RX ORDER — TAPENTADOL HYDROCHLORIDE 100 MG/1
TABLET, FILM COATED, EXTENDED RELEASE ORAL
Qty: 30 TABLET | Refills: 0 | Status: SHIPPED | OUTPATIENT
Start: 2022-01-17 | End: 2022-07-01 | Stop reason: SDUPTHER

## 2022-01-17 RX ORDER — HYDROCODONE BITARTRATE AND ACETAMINOPHEN 5; 325 MG/1; MG/1
TABLET ORAL
Qty: 50 TABLET | Refills: 0 | Status: SHIPPED | OUTPATIENT
Start: 2022-01-17 | End: 2022-07-01 | Stop reason: SDUPTHER

## 2022-01-17 NOTE — PROGRESS NOTES
Assessment:  1  Chronic pain syndrome    2  Lumbar radiculopathy    3  Myofascial pain syndrome    4  Post laminectomy syndrome    5  Spinal stenosis of lumbar region, unspecified whether neurogenic claudication present    6  Uncomplicated opioid dependence ([address] )    7  Long-term current use of opiate analgesic        Plan:  While the patient was in the office today, I did have a thorough conversation with the patient regarding their chronic pain syndrome, symptoms, medication regimen, and treatment plan  I encouraged the patient continue to use his spinal cord stimulators and if he feels is if he needs any reprogramming in the future he should follow-up with his stimulator reprogramming team   The patient was agreeable and verbalized an understanding  With regards to his medication regimen, I discussed with the patient that since he is noting significant and stable overall relief of his chronic pain symptoms and I feel it is reasonable and appropriate with his underlying etiology, will continue the Nucynta ER and the p r n  Norco as prescribed  The patient can continue the baclofen and gabapentin as prescribed by Neurology  The patient was agreeable and verbalized an understanding  [facility] Prescription Drug Monitoring Program report was reviewed and was appropriate      While the patient was in the office today, an annual review of the opioid contract/agreement was conducted, the patient was agreeable to continuing the contract as previously agreed upon and signed for this calendar year  A urine drug screen was collected at today's office visit as part of our medication management protocol  The point of care testing results were appropriate for what was being prescribed  The specimen will be sent for confirmatory testing   The drug screen is medically necessary because the patient is either dependent on opioid medication or is being considered for opioid medication therapy and the results could impact ongoing or future treatment  The drug screen is to evaluate for the presences or absence of prescribed, non-prescribed, and/or illicit drugs/substances  The patient's opioid scripts were sent to their pharmacy electronically and was given a 3 month supply of prescriptions with a Do Not Fill date(s) of February 9, 2022, March 9, 2022 and April 6, 2022  There are risks associated with opioid medications, including dependence, addiction and tolerance  The patient understands and agrees to use these medications only as prescribed  Potential side effects of the medications include, but are not limited to, constipation, drowsiness, addiction, impaired judgment and risk of fatal overdose if not taken as prescribed  The patient was warned against driving while taking sedation medications  Sharing medications is a felony  At this point in time, the patient is showing no signs of addiction, abuse, diversion or suicidal ideation  While the patient was in the office today the following annual depression, opioid risk, and opioid side effect screening tools were completed by the patient: Becks Depression Inventory, COMM, SOAPP, NOSE, & Pain Disability Index  If any of the scores were inappropriate or require follow up there will be seperate documenation in the patients chart to address those results  The patient will follow-up in 12 weeks for medication prescription refill and reevaluation  The patient was advised to contact the office should their symptoms worsen in the interim  The patient was agreeable and verbalized an understanding  History of Present Illness: The patient is a 64 y o  male last seen on  10/25/21 who presents for a follow up office visit in regards to chronic pain syndrome, as the patient's pain has been ongoing for greater than a year,  secondary to lumbar post-laminectomy syndrome with stenosis and radiculopathy as well as myofascial pain    The patient currently reports that at this point time as continues with his spinal cord stimulators and his medication regimen, he feels that overall his pain symptoms are significantly manageable and tolerable without any significant side effects or issues  The patient reports that since his last office visit he has discontinue the tizanidine and has started baclofen 10 mg as needed as per Neurology as he also continues his gabapentin as per Neurology as well  The patient presents today for regular medication follow-up visit  Current pain medications includes:  Nucynta  mg daily and Norco 5/325, 1 p o  b i d  p r n  for breakthrough pain, dispense number 50 pills per 30 days  The patient reports that this regimen is providing 70% pain relief  The patient is reporting no side effects from this pain medication regimen  Pain Contract Signed: 1/17/22  Last Urine Drug Screen: 1/17/22  Last Norco per pt 1/16/22  Last pill count: 8/13/21  Last Narcan: 4/14/21  Opioid Agreement 1/17/22  Beck/SOAAP screening 1/17/22    I have personally reviewed and/or updated the patient's past medical history, past surgical history, family history, social history, current medications, allergies, and vital signs today  Review of Systems:    Review of Systems   Respiratory: Negative for shortness of breath  Cardiovascular: Negative for chest pain  Gastrointestinal: Negative for constipation, diarrhea, nausea and vomiting  Musculoskeletal: Positive for gait problem  Negative for arthralgias, joint swelling and myalgias  Skin: Negative for rash  Neurological: Negative for dizziness, seizures and weakness  All other systems reviewed and are negative  History reviewed  No pertinent past medical history  History reviewed  No pertinent surgical history  History reviewed  No pertinent family history      Social History     Occupational History    Not on file   Tobacco Use    Smoking status: Current Every Day Smoker Types: Cigarettes    Smokeless tobacco: Never Used   Substance and Sexual Activity    Alcohol use: No    Drug use: No    Sexual activity: Not on file         Current Outpatient Medications:     baclofen 10 mg tablet, Take 10 mg by mouth as needed, Disp: , Rfl:     amLODIPine (NORVASC) 2 5 mg tablet, Take 2 5 mg by mouth daily, Disp: , Rfl:     atorvastatin (LIPITOR) 80 mg tablet, Take by mouth, Disp: , Rfl:     CHANTIX STARTING MONTH ELÍAS 0 5 MG X 11 & 1 MG X 42 tablet, Take by mouth daily As directed, Disp: , Rfl: 0    diclofenac sodium (VOLTAREN) 1 %, Apply 1 application topically 4 (four) times a day, Disp: , Rfl:     gabapentin (NEURONTIN) 600 MG tablet, Take 1 tablet by mouth 2 (two) times a day, Disp: , Rfl:     gabapentin (NEURONTIN) 800 mg tablet, Take 1 tablet by mouth 2 (two) times a day, Disp: , Rfl:     HYDROcodone-acetaminophen (NORCO) 5-325 mg per tablet, Take 1 PO BID PRN for break through pain  DO NOT FILL UNTIL: 2/9/22, Disp: 50 tablet, Rfl: 0    lamoTRIgine (LaMICtal) 100 MG TBDP, Take 100 mg by mouth daily, Disp: , Rfl:     LamoTRIgine 25 (21)-50 (7) MG KIT, USE 1 KIT AS DIRECTED, Disp: , Rfl: 0    lidocaine (LIDODERM) 5 %, Apply 1 patch topically, Disp: , Rfl:     lisinopril-hydrochlorothiazide (PRINZIDE,ZESTORETIC) 20-12 5 MG per tablet, Take 1 tablet by mouth daily, Disp: , Rfl: 3    meloxicam (MOBIC) 15 mg tablet, , Disp: , Rfl:     metFORMIN (GLUCOPHAGE) 1000 MG tablet, Take 1 tablet by mouth 2 (two) times a day, Disp: , Rfl:     naloxone (NARCAN) 4 mg/0 1 mL nasal spray, Administer 1 spray into a nostril  If no response after 2-3 minutes, give another dose in the other nostril using a new spray , Disp: 1 each, Rfl: 1    OXcarbazepine (TRILEPTAL) 600 mg tablet, 2 TABLET(S) BY MOUTH TWO TIMES DAILY, Disp: , Rfl: 2    Tapentadol HCl ER (Nucynta ER) 100 MG TB12, Take 1 PO BID for pain   DO NOT FILL UNTIL: 2/9/22, Disp: 30 tablet, Rfl: 0    Allergies   Allergen Reactions    Other     Sulfa Antibiotics Edema     Annotation - [id number]: face and hands redness and swelling       Physical Exam:    Ht 5' 9" (1 753 m)   Wt 80 7 kg (178 lb)   BMI 26 29 kg/m²     Constitutional:normal, well developed, well nourished, alert, in no distress and non-toxic and no overt pain behavior    Eyes:anicteric  HEENT:grossly intact  Neck:supple, symmetric, trachea midline and no masses   Pulmonary:even and unlabored  Cardiovascular:No edema or pitting edema present  Skin:Normal without rashes or lesions and well hydrated  Psychiatric:Mood and affect appropriate  Neurologic:Cranial Nerves II-XII grossly intact  Musculoskeletal:normal      Imaging  No orders to display         Orders Placed This Encounter   Procedures    MM ALL_Prescribed Meds and Special Instructions    MM DT_Alprazolam Definitive Test    MM DT_Amphetamine Definitive Test    MM DT_Aripiprazole Definitive Test    MM DT_Bath Salts Definitive Test    MM DT_Buprenorphine Definitive Test    MM DT_Butalbital Definitive Test    MM DT_Clonazepam Definitive Test    MM DT_Clozapine Definitive Test    MM DT_Cocaine Definitive Test    MM DT_Codeine Definitive Test    MM DT_Desipramine Definitive Test    MM DT_Dextromethorphan Definitive Test    MM Diazepam Definitive Test    MM DT_Ethyl Glucuronide/Ethyl Sulfate Definitive Test    MM DT_Fentanyl Definitive Test    MM DT_Haloperidol Definitive Test    MM DT_Heroin Definitive Test    MM DT_Hydrocodone Definitive Test    MM DT_Hydromorphone Definitive Test    MM DT_Imipramine Definitive Test    MM DT_Kratom Definitive Test    MM DT_Levorphanol Definitive Test    MM Lorazepam Definitive Test    MM DT_MDMA Definitive Test    MM DT_Meperidine Definitive Test    MM DT_Methadone Definitive Test    MM DT_Methamphetamine Definitive Test    MM DT_Morphine Definitive Test    MM DT_Olanzapine Definitive Test    MM DT_Oxazepam Definitive Test    MM DT_Oxycodone Definitive Test    MM DT_Oxymorphone Definitive Test    MM DT_Phencyclidine Definitive Test    MM DT_Phenobarbital Definitive Test    MM DT_Phentermine Definitive Test    MM DT_Quetiapine Definitive Test    MM DT_Risperidone Definitive Test    MM DT_Secobarbital Definitive Test    MM DT_Spice Definitive Test    MM DT_Tapentadol Definitive Test    MM DT_Temazapam Definitive Test    MM DT_THC Definitive Test    MM DT_Tramadol Definitive Test    MM DT_Methylphenidate Definitive Test

## 2022-01-17 NOTE — PATIENT INSTRUCTIONS
Opioid Safety   AMBULATORY CARE:   Opioid safety  includes the correct use, storage, and disposal of opioids  Examples of opioid medicines to treat pain include oxycodone, morphine, fentanyl, and codeine  Call your local emergency number (911 in the [address]), or have someone else call if:   · You have a seizure  · You cannot be woken  · You have trouble staying awake and your breathing is slow or shallow  · Your speech is slurred, or you are confused  · You are dizzy or stumble when you walk  Call your doctor, or have someone close to you call if:   · You are extremely drowsy, or you have trouble staying awake or speaking  · You have pale or clammy skin  · You have blue fingernails or lips  · Your heartbeat is slower than normal     · You cannot stop vomiting  · You have questions or concerns about your condition or care  Use opioids safely:   · Take prescribed opioids exactly as directed  Opioids come with directions based on the kind of opioid and how it is given  Do not take more than the recommended amount, or for longer than needed  · Do not give opioids to others or take opioids that belong to someone else  Misuse of opioids can lead to an addiction or overdose  · Do not mix opioids with other medicines or alcohol  The combination can cause an overdose, or lead to a coma  · Do not drive or operate heavy machinery after you take the opioid  Your provider or pharmacist can tell you how long to wait after a dose before you do these activities  · Talk to your healthcare provider if you have any side effects  He or she can help you prevent or relieve side effects  Side effects include nausea, sleepiness, itching, and trouble thinking clearly  Manage constipation:  Constipation is the most common side effect of opioid medicine  Constipation is when you have hard, dry bowel movements, or you go longer than usual between bowel movements   Tell your healthcare provider about all changes in your bowel movements while you are taking opioids  He or she may recommend laxative medicine to help you have a bowel movement  He or she may also change the kind of opioid you are taking, or change when you take it  The following are more ways you can prevent or relieve constipation:  · Drink liquids as directed  You may need to drink extra liquids to help soften and move your bowels  Ask how much liquid to drink each day and which liquids are best for you  · Eat high-fiber foods  This may help decrease constipation by adding bulk to your bowel movements  High-fiber foods include fruits, vegetables, whole-grain breads and cereals, and beans  Your healthcare provider or dietitian can help you create a high-fiber meal plan  Your provider may also recommend a fiber supplement if you cannot get enough fiber from food  · Exercise regularly  Regular physical activity can help stimulate your intestines  Walking is a good exercise to prevent or relieve constipation  Ask which exercises are best for you  · Schedule a time each day to have a bowel movement  This may help train your body to have regular bowel movements  Bend forward while you are on the toilet to help move the bowel movement out  Sit on the toilet for at least 10 minutes, even if you do not have a bowel movement  Store opioids safely:   · Store opioids where others cannot easily get them  Keep them in a locked cabinet or secure area  Do not  keep them in a purse or other bag you carry with you  A person may be looking for something else and find the opioids  · Make sure opioids are stored out of the reach of children  A child can easily overdose on opioids  Opioids may look like candy to a small child  The best way to dispose of opioids: The laws vary by country and area   In the [place], the best way is to return the opioids through a take-back program  This program is offered by the [place] [employer] Agency ([address])  The following are options for using the program:  · Take the opioids to a DELANO collection site  The site is often a law enforcement center  Call your local law enforcement center for scheduled take-back days in your area  You will be given information on where to go if the collection site is in a different location  · Take the opioids to an approved pharmacy or hospital   A pharmacy or hospital may be set up as a collection site  You will need to ask if it is a DELANO collection site if you were not directed there  A pharmacy or doctor's office may not be able to take back opioids unless it is a DELANO site  · Use a mail-back system  This means you are given containers to put the opioids into  You will then mail them in the containers  · Use a take-back drop box  This is a place to leave the opioids at any time  People and animals will not be able to get into the box  Your local law enforcement agency can tell you where to find a drop box in your area  Other safe ways to dispose of opioids: The medicine may come with disposal instructions  The instructions may vary depending on the brand of medicine you are using  Instructions may come in a Medication Guide, but not every medicine has one  You may instead get instructions from your pharmacy or doctor  Follow instructions carefully  The following are general guidelines to follow:  · Find out if you can flush the opioid  Some opioids can be flushed down the toilet or poured into the sink  You will need to contact authorities in your area to see if this is an option for you  The FDA also offers a list of medicines that are safe to flush down the toilet  You can check the list if you cannot get the information for your local area  · Ask your waste management company about rules for putting opioids in the trash  The company will be able to give you specific directions   Scratch out personal information on the original medicine label so it cannot be read  Then put it in the trash  Do not label the trash or put any information on it about the opioids  It should look like regular household trash so no one is tempted to look for the opioids  Keep the trash out of the reach of children and animals  Always make sure trash is secure  · Talk to officials if you live in a facility  If you live in a nursing home or assisted living center, talk to an official  The person will know the rules for your area  Other ways to manage pain:   · Ask your healthcare provider about non-opioid medicines to control pain  Nonprescription medicines include NSAIDs (such as ibuprofen) and acetaminophen  Prescription medicines include muscle relaxers, antidepressants, and steroids  · Pain may be managed without any medicines  Some ways to relieve pain include massage, aromatherapy, or meditation  Physical or occupational therapy may also help  For more information:   · Drug Enforcement Administration  [facility] [facility] [address]  Phone: 4- 109 - [phone]  Web Address: [facility]/drug_disposal/    · [address]  and Drug Administration  [facility] , [address] [place] [address]  Phone: 1- 561 - [phone]  Web Address: [email or web address]/    Follow up with your doctor or pain specialist as directed: You may need to have your dose adjusted  Your doctor or pain specialist can also help you find ways to manage pain without opioids  Write down your questions so you remember to ask them during your visits  © Copyright [employer] 2021 Information is for End User's use only and may not be sold, redistributed or otherwise used for commercial purposes  All illustrations and images included in CareNotes® are the copyrighted property of A D A M , Inc  or Bobbi Meza  The above information is an  only  It is not intended as medical advice for individual conditions or treatments   Talk to your doctor, nurse or pharmacist before following any medical regimen to see if it is safe and effective for you

## 2022-01-20 LAB
6MAM UR QL CFM: NEGATIVE NG/ML
7AMINOCLONAZEPAM UR QL CFM: NEGATIVE NG/ML
A-OH ALPRAZ UR QL CFM: NEGATIVE NG/ML
AMPHET UR QL CFM: NEGATIVE NG/ML
AMPHET UR QL CFM: NEGATIVE NG/ML
BUPRENORPHINE UR QL CFM: NEGATIVE NG/ML
BUTALBITAL UR QL CFM: NEGATIVE NG/ML
BZE UR QL CFM: NEGATIVE NG/ML
CODEINE UR QL CFM: NEGATIVE NG/ML
DESIPRAMINE UR QL CFM: NEGATIVE NG/ML
DESIPRAMINE UR QL CFM: NEGATIVE NG/ML
EDDP UR QL CFM: NEGATIVE NG/ML
ETHYL GLUCURONIDE UR QL CFM: NEGATIVE NG/ML
ETHYL SULFATE UR QL SCN: NEGATIVE NG/ML
EUTYLONE UR QL: NEGATIVE NG/ML
FENTANYL UR QL CFM: NEGATIVE NG/ML
GLIADIN IGG SER IA-ACNC: NEGATIVE NG/ML
GLUCOSE 30M P 50 G LAC PO SERPL-MCNC: NEGATIVE NG/ML
HYDROCODONE UR QL CFM: ABNORMAL NG/ML
HYDROCODONE UR QL CFM: ABNORMAL NG/ML
HYDROMORPHONE UR QL CFM: ABNORMAL NG/ML
IMIPRAMINE UR QL CFM: NEGATIVE NG/ML
LORAZEPAM UR QL CFM: NEGATIVE NG/ML
MDMA UR QL CFM: NEGATIVE NG/ML
ME-PHENIDATE UR QL CFM: NEGATIVE NG/ML
MEPERIDINE UR QL CFM: NEGATIVE NG/ML
METHADONE UR QL CFM: NEGATIVE NG/ML
METHAMPHET UR QL CFM: NEGATIVE NG/ML
MORPHINE UR QL CFM: NEGATIVE NG/ML
MORPHINE UR QL CFM: NEGATIVE NG/ML
NORBUPRENORPHINE UR QL CFM: NEGATIVE NG/ML
NORDIAZEPAM UR QL CFM: NEGATIVE NG/ML
NORFENTANYL UR QL CFM: NEGATIVE NG/ML
NORHYDROCODONE UR QL CFM: ABNORMAL NG/ML
NORHYDROCODONE UR QL CFM: ABNORMAL NG/ML
NORMEPERIDINE UR QL CFM: NEGATIVE NG/ML
NOROXYCODONE UR QL CFM: NEGATIVE NG/ML
OLANZAPINE QUANTIFICATION: NEGATIVE NG/ML
OPC-3373 QUANTIFICATION: NEGATIVE
OXAZEPAM UR QL CFM: NEGATIVE NG/ML
OXYCODONE UR QL CFM: NEGATIVE NG/ML
OXYMORPHONE UR QL CFM: NEGATIVE NG/ML
OXYMORPHONE UR QL CFM: NEGATIVE NG/ML
PCP UR QL CFM: NEGATIVE NG/ML
PHENOBARB UR QL CFM: NEGATIVE NG/ML
SECOBARBITAL UR QL CFM: NEGATIVE NG/ML
SL AMB 3-METHYL-FENTANYL QUANTIFICATION: NORMAL NG/ML
SL AMB 4-ANPP QUANTIFICATION: NORMAL NG/ML
SL AMB 4-FIBF QUANTIFICATION: NORMAL NG/ML
SL AMB 5F-ADB-M7 METABOLITE QUANTIFICATION: NEGATIVE NG/ML
SL AMB 7-OH-MITRAGYNINE (KRATOM ALKALOID) QUANTIFICATION: NEGATIVE NG/ML
SL AMB AB-FUBINACA-M3 METABOLITE QUANTIFICATION: NEGATIVE NG/ML
SL AMB ACETYL FENTANYL QUANTIFICATION: NORMAL NG/ML
SL AMB ACETYL NORFENTANYL QUANTIFICATION: NORMAL NG/ML
SL AMB ACRYL FENTANYL QUANTIFICATION: NORMAL NG/ML
SL AMB BUTRYL FENTANYL QUANTIFICATION: NORMAL NG/ML
SL AMB CARFENTANIL QUANTIFICATION: NORMAL NG/ML
SL AMB CLOZAPINE QUANTIFICATION: NEGATIVE NG/ML
SL AMB CTHC (MARIJUANA METABOLITE) QUANTIFICATION: NEGATIVE NG/ML
SL AMB CYCLOPROPYL FENTANYL QUANTIFICATION: NORMAL NG/ML
SL AMB DEXTROMETHORPHAN QUANTIFICATION: NEGATIVE NG/ML
SL AMB DEXTRORPHAN (DEXTROMETHORPHAN METABOLITE) QUANT: NEGATIVE NG/ML
SL AMB DEXTRORPHAN (DEXTROMETHORPHAN METABOLITE) QUANT: NEGATIVE NG/ML
SL AMB FURANYL FENTANYL QUANTIFICATION: NORMAL NG/ML
SL AMB HALOPERIDOL  QUANTIFICATION: NEGATIVE NG/ML
SL AMB HALOPERIDOL METABOLITE QUANTIFICATION: NEGATIVE NG/ML
SL AMB HYDROXYRISPERIDONE QUANTIFICATION: NEGATIVE NG/ML
SL AMB JWH018 METABOLITE QUANTIFICATION: NEGATIVE NG/ML
SL AMB JWH073 METABOLITE QUANTIFICATION: NEGATIVE NG/ML
SL AMB MDMB-FUBINACA-M1 METABOLITE QUANTIFICATION: NEGATIVE NG/ML
SL AMB METHOXYACETYL FENTANYL QUANTIFICATION: NORMAL NG/ML
SL AMB METHYLONE QUANTIFICATION: NEGATIVE NG/ML
SL AMB N-DESMETHYL U-47700 QUANTIFICATION: NORMAL NG/ML
SL AMB N-DESMETHYL-TRAMADOL QUANTIFICATION: NEGATIVE NG/ML
SL AMB N-DESMETHYLCLOZAPINE QUANTIFICATION: NEGATIVE NG/ML
SL AMB NORQUETIAPINE QUANTIFICATION: NEGATIVE NG/ML
SL AMB PHENTERMINE QUANTIFICATION: NEGATIVE NG/ML
SL AMB QUETIAPINE QUANTIFICATION: NEGATIVE NG/ML
SL AMB RCS4 METABOLITE QUANTIFICATION: NEGATIVE NG/ML
SL AMB RISPERIDONE QUANTIFICATION: NEGATIVE NG/ML
SL AMB RITALINIC ACID QUANTIFICATION: NEGATIVE NG/ML
SL AMB U-47700 QUANTIFICATION: NORMAL NG/ML
SPECIMEN DRAWN SERPL: NEGATIVE NG/ML
TAPENTADOL UR QL CFM: NORMAL NG/ML
TEMAZEPAM UR QL CFM: NEGATIVE NG/ML
TEMAZEPAM UR QL CFM: NEGATIVE NG/ML
TRAMADOL UR QL CFM: NEGATIVE NG/ML
URATE/CREAT 24H UR: NEGATIVE NG/ML

## 2022-01-22 ENCOUNTER — IMMUNIZATIONS (OUTPATIENT)
Dept: FAMILY MEDICINE CLINIC | Facility: HOSPITAL | Age: 57
End: 2022-01-22

## 2022-01-22 DIAGNOSIS — Z23 ENCOUNTER FOR IMMUNIZATION: Primary | ICD-10-CM

## 2022-01-22 PROCEDURE — 91300 COVID-19 PFIZER VACC 0.3 ML: CPT

## 2022-01-22 PROCEDURE — 0001A COVID-19 PFIZER VACC 0.3 ML: CPT

## 2022-04-11 ENCOUNTER — TELEPHONE (OUTPATIENT)
Dept: PAIN MEDICINE | Facility: CLINIC | Age: 57
End: 2022-04-11

## 2022-04-11 ENCOUNTER — OFFICE VISIT (OUTPATIENT)
Dept: PAIN MEDICINE | Facility: CLINIC | Age: 57
End: 2022-04-11
Payer: COMMERCIAL

## 2022-04-11 VITALS — WEIGHT: 182 LBS | HEIGHT: 69 IN | BODY MASS INDEX: 26.96 KG/M2

## 2022-04-11 DIAGNOSIS — M48.061 SPINAL STENOSIS OF LUMBAR REGION, UNSPECIFIED WHETHER NEUROGENIC CLAUDICATION PRESENT: ICD-10-CM

## 2022-04-11 DIAGNOSIS — M96.1 POST LAMINECTOMY SYNDROME: ICD-10-CM

## 2022-04-11 DIAGNOSIS — M47.812 CERVICAL SPONDYLOSIS WITHOUT MYELOPATHY: ICD-10-CM

## 2022-04-11 DIAGNOSIS — M47.14 OTHER SPONDYLOSIS WITH MYELOPATHY, THORACIC REGION: ICD-10-CM

## 2022-04-11 DIAGNOSIS — F11.20 UNCOMPLICATED OPIOID DEPENDENCE (HCC): ICD-10-CM

## 2022-04-11 DIAGNOSIS — M79.18 MYOFASCIAL PAIN SYNDROME: ICD-10-CM

## 2022-04-11 DIAGNOSIS — M48.04 THORACIC SPINAL STENOSIS: ICD-10-CM

## 2022-04-11 DIAGNOSIS — Z79.891 LONG-TERM CURRENT USE OF OPIATE ANALGESIC: ICD-10-CM

## 2022-04-11 DIAGNOSIS — M51.36 DEGENERATIVE DISC DISEASE, LUMBAR: ICD-10-CM

## 2022-04-11 DIAGNOSIS — M79.18 MYOFASCIAL PAIN: ICD-10-CM

## 2022-04-11 DIAGNOSIS — M54.16 LUMBAR RADICULOPATHY: ICD-10-CM

## 2022-04-11 DIAGNOSIS — G89.4 CHRONIC PAIN SYNDROME: Primary | ICD-10-CM

## 2022-04-11 DIAGNOSIS — M47.816 LUMBAR SPONDYLOSIS: ICD-10-CM

## 2022-04-11 DIAGNOSIS — M54.12 CERVICAL RADICULOPATHY: ICD-10-CM

## 2022-04-11 DIAGNOSIS — Z79.891 ENCOUNTER FOR LONG-TERM USE OF OPIATE ANALGESIC: ICD-10-CM

## 2022-04-11 PROCEDURE — 80305 DRUG TEST PRSMV DIR OPT OBS: CPT | Performed by: NURSE PRACTITIONER

## 2022-04-11 PROCEDURE — 99214 OFFICE O/P EST MOD 30 MIN: CPT | Performed by: NURSE PRACTITIONER

## 2022-04-11 RX ORDER — HYDROCODONE BITARTRATE AND ACETAMINOPHEN 5; 325 MG/1; MG/1
TABLET ORAL
Qty: 50 TABLET | Refills: 0 | Status: SHIPPED | OUTPATIENT
Start: 2022-05-25

## 2022-04-11 RX ORDER — BUPROPION HYDROCHLORIDE 150 MG/1
1 TABLET, EXTENDED RELEASE ORAL 2 TIMES DAILY
COMMUNITY
Start: 2022-01-31

## 2022-04-11 RX ORDER — NALOXONE HYDROCHLORIDE 4 MG/.1ML
SPRAY NASAL
Qty: 1 EACH | Refills: 1 | Status: SHIPPED | OUTPATIENT
Start: 2022-04-11

## 2022-04-11 RX ORDER — HYDROCODONE BITARTRATE AND ACETAMINOPHEN 5; 325 MG/1; MG/1
TABLET ORAL
Qty: 50 TABLET | Refills: 0 | Status: SHIPPED | OUTPATIENT
Start: 2022-06-23 | End: 2022-07-01 | Stop reason: SDUPTHER

## 2022-04-11 RX ORDER — ALPRAZOLAM 0.25 MG/1
0.25 TABLET ORAL 3 TIMES DAILY PRN
COMMUNITY
Start: 2021-11-12 | End: 2022-05-11

## 2022-04-11 NOTE — PROGRESS NOTES
Assessment:  1  Chronic pain syndrome    2  Uncomplicated opioid dependence ([address] )    3  Long-term current use of opiate analgesic    4  Cervical radiculopathy    5  Lumbar radiculopathy    6  Other spondylosis with myelopathy, thoracic region    7  Degenerative disc disease, lumbar    8  Lumbar spondylosis    9  Cervical spondylosis without myelopathy    10  Myofascial pain    11  Post laminectomy syndrome    12  Thoracic spinal stenosis    13  Spinal stenosis of lumbar region, unspecified whether neurogenic claudication present    14  Myofascial pain syndrome    15  Encounter for long-term use of opiate analgesic        Plan:  1  Patient may continue Norco 5/325 mg twice a day as needed for breakthrough pain #50 tablets for 30 days  Patient still has a prescription on file at the pharmacy therefore I will provide him with 2 more months worth of prescriptions with do not fill dates of May 25, 2020 to  and June 23, 2022  In regards to Nucynta  mg daily, patient states that his pharmacy will not fill this medication and has been out of it for few days  I will have our office call the pharmacy to discuss the issue     The patient is currently receiving 60 MME of opioid medication  This places him at a higher-risk for respiratory depression/overdose  Therefore, I  will prescribe naloxone nasal spray  It was explained to the patient that this is an injectable opiate antagonist which is indicated for emergency treatment for opiate overdose  The patient was also instructed on how to use the Narcan nasal spray, in the case of an opioid emergency  [place] Prescription Drug Monitoring Program report was reviewed and was appropriate     A urine drug screen was collected at today's office visit as part of our medication management protocol  The point of care testing results were appropriate for what was being prescribed  The specimen will be sent for confirmatory testing   The drug screen is medically necessary because the patient is either dependent on opioid medication or is being considered for opioid medication therapy and the results could impact ongoing or future treatment  The drug screen is to evaluate for the presences or absence of prescribed, non-prescribed, and/or illicit drugs/substances  There are risks associated with opioid medications, including dependence, addiction and tolerance  The patient understands and agrees to use these medications only as prescribed  Potential side effects of the medications include, but are not limited to, constipation, drowsiness, addiction, impaired judgment and risk of fatal overdose if not taken as prescribed  The patient was warned against driving while taking sedation medications  Sharing medications is a felony  At this point in time, the patient is showing no signs of addiction, abuse, diversion or suicidal ideation  2  Patient may continue to follow with [employer] for periodic reprogramming sessions of his spinal cord stimulator device as needed   3  Patient will continue to follow with Neurology as scheduled  4  Patient may continue gabapentin as prescribed  5  Patient will follow-up in 3 months or sooner if needed    History of Present Illness: The patient is a 64 y o  male with a history of T12 decompression and fusion with instrumentation status post [place] spinal cord stimulator and [employer] peripheral stimulator last seen on 1/17/22 who presents for a follow up office visit in regards to chronic low back pain secondary to lumbar post-laminectomy syndrome and chronic pain syndrome  The patient denies bowel or bladder incontinence or saddle anesthesia  The patient offers no new complaints today other than that his pharmacy will not fill his Nucynta  He states he has been out of it for a few days  He does continue with Norco 5/325 mg twice a day as needed for pain    He reports a 60% improvement of his pain without side effects    The patient rates pain a 5/10 on the numeric pain rating scale  He intermittently has pain in the morning and at night which is described as burning, dull aching, sharp, throbbing, cramping, pressure like, shooting, numbness and pins and needles    Pain Contract Signed: 1/17/22  Last Urine Drug Screen: 4/11/22  Last Norco per pt 1/16/22  Last pill count: 8/13/21  Last Narcan: 4/11/22  Opioid Agreement 1/17/22  Beck/SOAAP screening 1/17/22    I have personally reviewed and/or updated the patient's past medical history, past surgical history, family history, social history, current medications, allergies, and vital signs today  Review of Systems:    Review of Systems   Respiratory: Negative for shortness of breath  Cardiovascular: Negative for chest pain  Gastrointestinal: Negative for constipation, diarrhea, nausea and vomiting  Musculoskeletal: Positive for gait problem  Negative for arthralgias, joint swelling and myalgias  Skin: Negative for rash  Neurological: Negative for dizziness, seizures and weakness  All other systems reviewed and are negative  No past medical history on file  No past surgical history on file  No family history on file      Social History     Occupational History    Not on file   Tobacco Use    Smoking status: Current Every Day Smoker     Types: Cigarettes    Smokeless tobacco: Never Used   Substance and Sexual Activity    Alcohol use: No    Drug use: No    Sexual activity: Not on file         Current Outpatient Medications:     buPROPion (WELLBUTRIN SR) 150 mg 12 hr tablet, Take 1 tablet by mouth 2 (two) times a day, Disp: , Rfl:     ALPRAZolam (XANAX) 0 25 mg tablet, Take 0 25 mg by mouth Three times daily as needed (Patient not taking: Reported on 4/11/2022 ), Disp: , Rfl:     amLODIPine (NORVASC) 2 5 mg tablet, Take 2 5 mg by mouth daily, Disp: , Rfl:     atorvastatin (LIPITOR) 80 mg tablet, Take by mouth, Disp: , Rfl:     baclofen 10 mg tablet, Take 10 mg by mouth as needed, Disp: , Rfl:     CHANTIX STARTING MONTH ELÍAS 0 5 MG X 11 & 1 MG X 42 tablet, Take by mouth daily As directed, Disp: , Rfl: 0    diclofenac sodium (VOLTAREN) 1 %, Apply 1 application topically 4 (four) times a day, Disp: , Rfl:     gabapentin (NEURONTIN) 600 MG tablet, Take 1 tablet by mouth 2 (two) times a day, Disp: , Rfl:     gabapentin (NEURONTIN) 800 mg tablet, Take 1 tablet by mouth 2 (two) times a day, Disp: , Rfl:     HYDROcodone-acetaminophen (NORCO) 5-325 mg per tablet, Take 1 PO BID PRN for breakthrough pain  DO NOT FILL UNTIL 4/6/2022, Disp: 50 tablet, Rfl: 0    [START ON 6/23/2022] HYDROcodone-acetaminophen (NORCO) 5-325 mg per tablet, Take 1 PO BID PRN for break through pain  DO NOT FILL UNTIL: 6/23/22, Disp: 50 tablet, Rfl: 0    [START ON 5/25/2022] HYDROcodone-acetaminophen (NORCO) 5-325 mg per tablet, Take 1 PO BID PRN for breakthrough pain  DO NOT FILL UNTIL 5/25/22, Disp: 50 tablet, Rfl: 0    lamoTRIgine (LaMICtal) 100 MG TBDP, Take 100 mg by mouth daily, Disp: , Rfl:     LamoTRIgine 25 (21)-50 (7) MG KIT, USE 1 KIT AS DIRECTED, Disp: , Rfl: 0    lidocaine (LIDODERM) 5 %, Apply 1 patch topically, Disp: , Rfl:     lisinopril-hydrochlorothiazide (PRINZIDE,ZESTORETIC) 20-12 5 MG per tablet, Take 1 tablet by mouth daily, Disp: , Rfl: 3    meloxicam (MOBIC) 15 mg tablet, , Disp: , Rfl:     metFORMIN (GLUCOPHAGE) 1000 MG tablet, Take 1 tablet by mouth 2 (two) times a day, Disp: , Rfl:     naloxone (NARCAN) 4 mg/0 1 mL nasal spray, Administer 1 spray into a nostril  If no response after 2-3 minutes, give another dose in the other nostril using a new spray , Disp: 1 each, Rfl: 1    OXcarbazepine (TRILEPTAL) 600 mg tablet, 2 TABLET(S) BY MOUTH TWO TIMES DAILY, Disp: , Rfl: 2    Tapentadol HCl ER (Nucynta ER) 100 MG TB12, Take 1 PO BID for pain   DO NOT FILL UNTIL: 2/9/22, Disp: 30 tablet, Rfl: 0    Tapentadol HCl ER (Nucynta ER) 100 MG TB12, Take 1 PO QD for pain  DO NOT FILL UNTIL 3/9/2022, Disp: 30 tablet, Rfl: 0    Tapentadol HCl ER (Nucynta ER) 100 MG TB12, Take 1 PO QD for pain  DO NOT FILL UNTIL 4/6/2022, Disp: 30 tablet, Rfl: 0    Allergies   Allergen Reactions    Other     Sulfa Antibiotics Edema     Annotation - [id number]: face and hands redness and swelling       Physical Exam:    Ht 5' 9" (1 753 m)   Wt 82 6 kg (182 lb)   BMI 26 88 kg/m²     Constitutional:normal, well developed, well nourished, alert, in no distress and non-toxic and no overt pain behavior    Eyes:anicteric  HEENT:grossly intact  Neck:supple, symmetric, trachea midline and no masses   Pulmonary:even and unlabored  Cardiovascular:No edema or pitting edema present  Skin:Normal without rashes or lesions and well hydrated  Psychiatric:Mood and affect appropriate  Neurologic:Cranial Nerves II-XII grossly intact  Musculoskeletal:Slightly antalgic gait but steady without assistive devices      Imaging  No orders to display         Orders Placed This Encounter   Procedures    MM ALL_Prescribed Meds and Special Instructions    MM DT_Alprazolam Definitive Test    MM DT_Amphetamine Definitive Test    MM DT_Aripiprazole Definitive Test    MM DT_Bath Salts Definitive Test    MM DT_Buprenorphine Definitive Test    MM DT_Butalbital Definitive Test    MM DT_Clonazepam Definitive Test    MM DT_Clozapine Definitive Test    MM DT_Cocaine Definitive Test    MM DT_Codeine Definitive Test    MM DT_Desipramine Definitive Test    MM DT_Dextromethorphan Definitive Test    MM Diazepam Definitive Test    MM DT_Ethyl Glucuronide/Ethyl Sulfate Definitive Test    MM DT_Fentanyl Definitive Test    MM DT_Haloperidol Definitive Test    MM DT_Heroin Definitive Test    MM DT_Hydrocodone Definitive Test    MM DT_Hydromorphone Definitive Test    MM DT_Imipramine Definitive Test    MM DT_Kratom Definitive Test    MM DT_Levorphanol Definitive Test    MM Lorazepam Definitive Test    MM DT_MDMA Definitive Test    MM DT_Meperidine Definitive Test    MM DT_Methadone Definitive Test    MM DT_Methamphetamine Definitive Test    MM DT_Morphine Definitive Test    MM DT_Olanzapine Definitive Test    MM DT_Oxazepam Definitive Test    MM DT_Oxycodone Definitive Test    MM DT_Oxymorphone Definitive Test    MM DT_Phencyclidine Definitive Test    MM DT_Phenobarbital Definitive Test    MM DT_Phentermine Definitive Test    MM DT_Quetiapine Definitive Test    MM DT_Risperidone Definitive Test    MM DT_Secobarbital Definitive Test    MM DT_Spice Definitive Test    MM DT_Tapentadol Definitive Test    MM DT_Temazapam Definitive Test    MM DT_THC Definitive Test    MM DT_Tramadol Definitive Test    MM DT_Methylphenidate Definitive Test

## 2022-04-11 NOTE — TELEPHONE ENCOUNTER
S/w [facility],Nucynta requires PA    S/w pt and advised of same  Pt states that he has [employer] for prescriptions but has lost the card and does not have Member or ID#, or phone #  Advised will send to PA team  Pt has been out of his Nucynta for 3 days  Can this be expedited?

## 2022-04-11 NOTE — PATIENT INSTRUCTIONS
Opioid Safety   WHAT YOU NEED TO KNOW:   An opioid medicine is used to treat pain  Examples are oxycodone, morphine, fentanyl, or codeine  Pain control and management may help you rest, heal, and return to your daily activities  You and your family will receive information about how to manage your pain at home  The instructions will include what to do if you have side effects as your pain is managed  You will get information on how to handle opioid medicine safely  You will also get suggestions on how to control pain without opioids  It is important to follow all instructions so your pain is managed effectively  DISCHARGE INSTRUCTIONS:   Call your local emergency number (911 in the [place]), or have someone else call if:   · You have a seizure  · You cannot be woken  · You have trouble staying awake and your breathing is slow or shallow  · Your speech is slurred, or you are confused  · You are dizzy or stumble when you walk  Call your doctor, or have someone close to you call if:   · You are extremely drowsy, or you have trouble staying awake or speaking  · You have pale or clammy skin  · You have blue fingernails or lips  · Your heartbeat is slower than normal     · You cannot stop vomiting  · You have questions or concerns about your condition or care  Use opioids safely:   · Take prescribed opioids exactly as directed  Opioids come with directions based on the kind and how it is given  Talk to your healthcare provider or a pharmacist if you have any questions  Do not take more than the recommended amount  Too much can cause a life-threatening overdose  Do not continue to take it after your pain stops  You may develop tolerance  This means you keep needing higher doses to get the same effect  You may also develop opioid use disorder  This means you are not able to control your opioid use  · Do not give opioids to others or take opioids that belong to someone else    The kind or amount one person takes may not be right for another  The person you share them with may also be taking medicines that do not mix with opioids  He or she may drink alcohol or use other drugs that can cause life-threatening problems when mixed with opioids  · Do not mix opioids with other medicines or alcohol  The combination can cause an overdose, or cause you to stop breathing  Alcohol, sleeping pills, and medicines such as antihistamines can make you sleepy  A combination with opioids can lead to a coma  · Do not drive or operate heavy machinery after you use an opioid  You may feel drowsy or have trouble concentrating  You can injure yourself or others if you drive or use heavy machinery when you are not alert  Your provider or pharmacist can tell you how long to wait after a dose before you do these activities  · Talk to your healthcare provider if you have any side effects  Side effects include nausea, sleepiness, itching, and trouble thinking clearly  Your provider may need to make changes to the kind or amount of opioid you are taking  He or she can also help you find ways to prevent or relieve side effects  Manage constipation:  Constipation is the most common side effect of opioid medicine  Constipation is when you have hard, dry bowel movements, or you go longer than usual between bowel movements  Tell your healthcare provider about all changes in your bowel movements while you are taking opioids  He or she may recommend laxative medicine to help you have a bowel movement  He or she may also change the kind of opioid you are taking, or change when you take it  The following are more ways you can prevent or relieve constipation:  · Drink liquids as directed  You may need to drink extra liquids to help soften and move your bowels  Ask how much liquid to drink each day and which liquids are best for you  · Eat high-fiber foods    This may help decrease constipation by adding bulk to your bowel movements  High-fiber foods include fruits, vegetables, whole-grain breads and cereals, and beans  Your healthcare provider or dietitian can help you create a high-fiber meal plan  Your provider may also recommend a fiber supplement if you cannot get enough fiber from food  · Exercise regularly  Regular physical activity can help stimulate your intestines  Walking is a good exercise to prevent or relieve constipation  Ask which exercises are best for you  · Schedule a time each day to have a bowel movement  This may help train your body to have regular bowel movements  Bend forward while you are on the toilet to help move the bowel movement out  Sit on the toilet for at least 10 minutes, even if you do not have a bowel movement  Store opioids safely:   · Store opioids where others cannot easily get them  Keep them in a locked cabinet or secure area  Do not  keep them in a purse or other bag you carry with you  A person may be looking for something else and find the opioids  · Make sure opioids are stored out of the reach of children  A child can easily overdose on opioids  Opioids may look like candy to a small child  The best way to dispose of opioids: The laws vary by country and area  In the [place], the best way is to return the opioids through a take-back program  This program is offered by the [employer])  The following are options for using the program:  · Take the opioids to a DELANO collection site  The site is often a law enforcement center  Call your local law enforcement center for scheduled take-back days in your area  You will be given information on where to go if the collection site is in a different location  · Take the opioids to an approved pharmacy or hospital   A pharmacy or hospital may be set up as a collection site  You will need to ask if it is a DELANO collection site if you were not directed there   A pharmacy or doctor's office may not be able to take back opioids unless it is a DELANO site  · Use a mail-back system  This means you are given containers to put the opioids into  You will then mail them in the containers  · Use a take-back drop box  This is a place to leave the opioids at any time  People and animals will not be able to get into the box  Your local law enforcement agency can tell you where to find a drop box in your area  Other safe ways to dispose of opioids: The medicine may come with disposal instructions  The instructions may vary depending on the brand of medicine you are using  Instructions may come in a Medication Guide, but not every medicine has one  You may instead get instructions from your pharmacy or doctor  Follow instructions carefully  The following are general guidelines to follow:  · Find out if you can flush the opioid  Some opioids can be flushed down the toilet or poured into the sink  You will need to contact authorities in your area to see if this is an option for you  The FDA also offers a list of medicines that are safe to flush down the toilet  You can check the list if you cannot get the information for your local area  · Ask your waste management company about rules for putting opioids in the trash  The company will be able to give you specific directions  Scratch out personal information on the original medicine label so it cannot be read  Then put it in the trash  Do not label the trash or put any information on it about the opioids  It should look like regular household trash so no one is tempted to look for the opioids  Keep the trash out of the reach of children and animals  Always make sure trash is secure  · Talk to officials if you live in a facility  If you live in a nursing home or assisted living center, talk to an official  The person will know the rules for your area  Other ways to manage pain:   · Ask your healthcare provider about non-opioid medicines to control pain  Some medicines may even work better than opioids, depending on the cause of your pain  Nonprescription medicines include NSAIDs (such as ibuprofen) and acetaminophen  Prescription medicines include muscle relaxers, antidepressants, and steroids  · Pain may be managed without any medicines  Some ways to relieve pain include massage, aromatherapy, or meditation  Physical or occupational therapy may also help  For more information:   · Drug Enforcement Administration  [address]  Phone: 4- 435 - [phone]  Web Address: [facility]/drug_[email or web address]    · [address] and Drug Administration  [place] , [address]  Phone: 4- [phone]  Web Address: [email or web address]    Follow up with your doctor or pain specialist as directed: You may need to have your dose adjusted  Your doctor or pain specialist can also help you find ways to manage pain without opioids  Write down your questions so you remember to ask them during your visits  © Copyright [employer] 2022 Information is for End User's use only and may not be sold, redistributed or otherwise used for commercial purposes  All illustrations and images included in CareNotes® are the copyrighted property of A D A M , Inc  or [address] [place], [place] [address]tiago   The above information is an  only  It is not intended as medical advice for individual conditions or treatments  Talk to your doctor, nurse or pharmacist before following any medical regimen to see if it is safe and effective for you  Opioid Safety   WHAT YOU NEED TO KNOW:   An opioid medicine is used to treat pain  Examples are oxycodone, morphine, fentanyl, or codeine  Pain control and management may help you rest, heal, and return to your daily activities  You and your family will receive information about how to manage your pain at home   The instructions will include what to do if you have side effects as your pain is managed  You will get information on how to handle opioid medicine safely  You will also get suggestions on how to control pain without opioids  It is important to follow all instructions so your pain is managed effectively  DISCHARGE INSTRUCTIONS:   Call your local emergency number (911 in the [place]), or have someone else call if:   · You have a seizure  · You cannot be woken  · You have trouble staying awake and your breathing is slow or shallow  · Your speech is slurred, or you are confused  · You are dizzy or stumble when you walk  Call your doctor, or have someone close to you call if:   · You are extremely drowsy, or you have trouble staying awake or speaking  · You have pale or clammy skin  · You have blue fingernails or lips  · Your heartbeat is slower than normal     · You cannot stop vomiting  · You have questions or concerns about your condition or care  Use opioids safely:   · Take prescribed opioids exactly as directed  Opioids come with directions based on the kind and how it is given  Talk to your healthcare provider or a pharmacist if you have any questions  Do not take more than the recommended amount  Too much can cause a life-threatening overdose  Do not continue to take it after your pain stops  You may develop tolerance  This means you keep needing higher doses to get the same effect  You may also develop opioid use disorder  This means you are not able to control your opioid use  · Do not give opioids to others or take opioids that belong to someone else  The kind or amount one person takes may not be right for another  The person you share them with may also be taking medicines that do not mix with opioids  He or she may drink alcohol or use other drugs that can cause life-threatening problems when mixed with opioids  · Do not mix opioids with other medicines or alcohol  The combination can cause an overdose, or cause you to stop breathing   Alcohol, sleeping pills, and medicines such as antihistamines can make you sleepy  A combination with opioids can lead to a coma  · Do not drive or operate heavy machinery after you use an opioid  You may feel drowsy or have trouble concentrating  You can injure yourself or others if you drive or use heavy machinery when you are not alert  Your provider or pharmacist can tell you how long to wait after a dose before you do these activities  · Talk to your healthcare provider if you have any side effects  Side effects include nausea, sleepiness, itching, and trouble thinking clearly  Your provider may need to make changes to the kind or amount of opioid you are taking  He or she can also help you find ways to prevent or relieve side effects  Manage constipation:  Constipation is the most common side effect of opioid medicine  Constipation is when you have hard, dry bowel movements, or you go longer than usual between bowel movements  Tell your healthcare provider about all changes in your bowel movements while you are taking opioids  He or she may recommend laxative medicine to help you have a bowel movement  He or she may also change the kind of opioid you are taking, or change when you take it  The following are more ways you can prevent or relieve constipation:  · Drink liquids as directed  You may need to drink extra liquids to help soften and move your bowels  Ask how much liquid to drink each day and which liquids are best for you  · Eat high-fiber foods  This may help decrease constipation by adding bulk to your bowel movements  High-fiber foods include fruits, vegetables, whole-grain breads and cereals, and beans  Your healthcare provider or dietitian can help you create a high-fiber meal plan  Your provider may also recommend a fiber supplement if you cannot get enough fiber from food  · Exercise regularly  Regular physical activity can help stimulate your intestines   Walking is a good exercise to prevent or relieve constipation  Ask which exercises are best for you  · Schedule a time each day to have a bowel movement  This may help train your body to have regular bowel movements  Bend forward while you are on the toilet to help move the bowel movement out  Sit on the toilet for at least 10 minutes, even if you do not have a bowel movement  Store opioids safely:   · Store opioids where others cannot easily get them  Keep them in a locked cabinet or secure area  Do not  keep them in a purse or other bag you carry with you  A person may be looking for something else and find the opioids  · Make sure opioids are stored out of the reach of children  A child can easily overdose on opioids  Opioids may look like candy to a small child  The best way to dispose of opioids: The laws vary by country and area  In the [place], the best way is to return the opioids through a take-back program  This program is offered by the [employer])  The following are options for using the program:  · Take the opioids to a DELANO collection site  The site is often a law enforcement center  Call your local law enforcement center for scheduled take-back days in your area  You will be given information on where to go if the collection site is in a different location  · Take the opioids to an approved pharmacy or hospital   A pharmacy or hospital may be set up as a collection site  You will need to ask if it is a DELANO collection site if you were not directed there  A pharmacy or doctor's office may not be able to take back opioids unless it is a DELANO site  · Use a mail-back system  This means you are given containers to put the opioids into  You will then mail them in the containers  · Use a take-back drop box  This is a place to leave the opioids at any time  People and animals will not be able to get into the box   Your local law enforcement agency can tell you where to find a drop box in your area  Other safe ways to dispose of opioids: The medicine may come with disposal instructions  The instructions may vary depending on the brand of medicine you are using  Instructions may come in a Medication Guide, but not every medicine has one  You may instead get instructions from your pharmacy or doctor  Follow instructions carefully  The following are general guidelines to follow:  · Find out if you can flush the opioid  Some opioids can be flushed down the toilet or poured into the sink  You will need to contact authorities in your area to see if this is an option for you  The FDA also offers a list of medicines that are safe to flush down the toilet  You can check the list if you cannot get the information for your local area  · Ask your waste management company about rules for putting opioids in the trash  The company will be able to give you specific directions  Scratch out personal information on the original medicine label so it cannot be read  Then put it in the trash  Do not label the trash or put any information on it about the opioids  It should look like regular household trash so no one is tempted to look for the opioids  Keep the trash out of the reach of children and animals  Always make sure trash is secure  · Talk to officials if you live in a facility  If you live in a nursing home or assisted living center, talk to an official  The person will know the rules for your area  Other ways to manage pain:   · Ask your healthcare provider about non-opioid medicines to control pain  Some medicines may even work better than opioids, depending on the cause of your pain  Nonprescription medicines include NSAIDs (such as ibuprofen) and acetaminophen  Prescription medicines include muscle relaxers, antidepressants, and steroids  · Pain may be managed without any medicines  Some ways to relieve pain include massage, aromatherapy, or meditation   Physical or occupational therapy may also help  For more information:   · Drug Enforcement Administration  [address]  Phone: 2- [phone] - [phone]  Web Address: [email or web address]_[email or web address]    · [address] and Drug Administration  [place] , [address]  Phone: 5- [phone] - [phone]  Web Address: [email or web address]/    Follow up with your doctor or pain specialist as directed: You may need to have your dose adjusted  Your doctor or pain specialist can also help you find ways to manage pain without opioids  Write down your questions so you remember to ask them during your visits  © Copyright [employer] 2022 Information is for End User's use only and may not be sold, redistributed or otherwise used for commercial purposes  All illustrations and images included in CareNotes® are the copyrighted property of A D A M , Inc  or [facility] [employer]   The above information is an  only  It is not intended as medical advice for individual conditions or treatments  Talk to your doctor, nurse or pharmacist before following any medical regimen to see if it is safe and effective for you  Opioid Safety   WHAT YOU NEED TO KNOW:   An opioid medicine is used to treat pain  Examples are oxycodone, morphine, fentanyl, or codeine  Pain control and management may help you rest, heal, and return to your daily activities  You and your family will receive information about how to manage your pain at home  The instructions will include what to do if you have side effects as your pain is managed  You will get information on how to handle opioid medicine safely  You will also get suggestions on how to control pain without opioids  It is important to follow all instructions so your pain is managed effectively  DISCHARGE INSTRUCTIONS:   Call your local emergency number (911 in the [place]), or have someone else call if:   · You have a seizure      · You cannot be woken     · You have trouble staying awake and your breathing is slow or shallow  · Your speech is slurred, or you are confused  · You are dizzy or stumble when you walk  Call your doctor, or have someone close to you call if:   · You are extremely drowsy, or you have trouble staying awake or speaking  · You have pale or clammy skin  · You have blue fingernails or lips  · Your heartbeat is slower than normal     · You cannot stop vomiting  · You have questions or concerns about your condition or care  Use opioids safely:   · Take prescribed opioids exactly as directed  Opioids come with directions based on the kind and how it is given  Talk to your healthcare provider or a pharmacist if you have any questions  Do not take more than the recommended amount  Too much can cause a life-threatening overdose  Do not continue to take it after your pain stops  You may develop tolerance  This means you keep needing higher doses to get the same effect  You may also develop opioid use disorder  This means you are not able to control your opioid use  · Do not give opioids to others or take opioids that belong to someone else  The kind or amount one person takes may not be right for another  The person you share them with may also be taking medicines that do not mix with opioids  He or she may drink alcohol or use other drugs that can cause life-threatening problems when mixed with opioids  · Do not mix opioids with other medicines or alcohol  The combination can cause an overdose, or cause you to stop breathing  Alcohol, sleeping pills, and medicines such as antihistamines can make you sleepy  A combination with opioids can lead to a coma  · Do not drive or operate heavy machinery after you use an opioid  You may feel drowsy or have trouble concentrating  You can injure yourself or others if you drive or use heavy machinery when you are not alert   Your provider or pharmacist can tell you how long to wait after a dose before you do these activities  · Talk to your healthcare provider if you have any side effects  Side effects include nausea, sleepiness, itching, and trouble thinking clearly  Your provider may need to make changes to the kind or amount of opioid you are taking  He or she can also help you find ways to prevent or relieve side effects  Manage constipation:  Constipation is the most common side effect of opioid medicine  Constipation is when you have hard, dry bowel movements, or you go longer than usual between bowel movements  Tell your healthcare provider about all changes in your bowel movements while you are taking opioids  He or she may recommend laxative medicine to help you have a bowel movement  He or she may also change the kind of opioid you are taking, or change when you take it  The following are more ways you can prevent or relieve constipation:  · Drink liquids as directed  You may need to drink extra liquids to help soften and move your bowels  Ask how much liquid to drink each day and which liquids are best for you  · Eat high-fiber foods  This may help decrease constipation by adding bulk to your bowel movements  High-fiber foods include fruits, vegetables, whole-grain breads and cereals, and beans  Your healthcare provider or dietitian can help you create a high-fiber meal plan  Your provider may also recommend a fiber supplement if you cannot get enough fiber from food  · Exercise regularly  Regular physical activity can help stimulate your intestines  Walking is a good exercise to prevent or relieve constipation  Ask which exercises are best for you  · Schedule a time each day to have a bowel movement  This may help train your body to have regular bowel movements  Bend forward while you are on the toilet to help move the bowel movement out  Sit on the toilet for at least 10 minutes, even if you do not have a bowel movement      Store opioids safely:   · Store opioids where others cannot easily get them  Keep them in a locked cabinet or secure area  Do not  keep them in a purse or other bag you carry with you  A person may be looking for something else and find the opioids  · Make sure opioids are stored out of the reach of children  A child can easily overdose on opioids  Opioids may look like candy to a small child  The best way to dispose of opioids: The laws vary by country and area  In the [place], the best way is to return the opioids through a take-back program  This program is offered by the [employer] ([employer])  The following are options for using the program:  · Take the opioids to a DELANO collection site  The site is often a law enforcement center  Call your local law enforcement center for scheduled take-back days in your area  You will be given information on where to go if the collection site is in a different location  · Take the opioids to an approved pharmacy or hospital   A pharmacy or hospital may be set up as a collection site  You will need to ask if it is a DELANO collection site if you were not directed there  A pharmacy or doctor's office may not be able to take back opioids unless it is a DELANO site  · Use a mail-back system  This means you are given containers to put the opioids into  You will then mail them in the containers  · Use a take-back drop box  This is a place to leave the opioids at any time  People and animals will not be able to get into the box  Your local law enforcement agency can tell you where to find a drop box in your area  Other safe ways to dispose of opioids: The medicine may come with disposal instructions  The instructions may vary depending on the brand of medicine you are using  Instructions may come in a Medication Guide, but not every medicine has one  You may instead get instructions from your pharmacy or doctor  Follow instructions carefully   The following are general guidelines to follow:  · Find out if you can flush the opioid  Some opioids can be flushed down the toilet or poured into the sink  You will need to contact authorities in your area to see if this is an option for you  The FDA also offers a list of medicines that are safe to flush down the toilet  You can check the list if you cannot get the information for your local area  · Ask your waste management company about rules for putting opioids in the trash  The company will be able to give you specific directions  Scratch out personal information on the original medicine label so it cannot be read  Then put it in the trash  Do not label the trash or put any information on it about the opioids  It should look like regular household trash so no one is tempted to look for the opioids  Keep the trash out of the reach of children and animals  Always make sure trash is secure  · Talk to officials if you live in a facility  If you live in a nursing home or assisted living center, talk to an official  The person will know the rules for your area  Other ways to manage pain:   · Ask your healthcare provider about non-opioid medicines to control pain  Some medicines may even work better than opioids, depending on the cause of your pain  Nonprescription medicines include NSAIDs (such as ibuprofen) and acetaminophen  Prescription medicines include muscle relaxers, antidepressants, and steroids  · Pain may be managed without any medicines  Some ways to relieve pain include massage, aromatherapy, or meditation  Physical or occupational therapy may also help  For more information:   · Drug Enforcement Administration  [facility]  [address]  Phone: [phone]  Web Address: [facility]/drug_[email or web address]    · [address] and Drug Administration  [place] Sharda Alvarez , [address]  Phone: [phone]  Web Address: http://[employer]/    Follow up with your doctor or pain specialist as directed: You may need to have your dose adjusted  Your doctor or pain specialist can also help you find ways to manage pain without opioids  Write down your questions so you remember to ask them during your visits  © Copyright [employer] 2022 Information is for End User's use only and may not be sold, redistributed or otherwise used for commercial purposes  All illustrations and images included in CareNotes® are the copyrighted property of [employer]  or [facility] Jonas   The above information is an  only  It is not intended as medical advice for individual conditions or treatments  Talk to your doctor, nurse or pharmacist before following any medical regimen to see if it is safe and effective for you

## 2022-04-13 LAB
6MAM UR QL CFM: NEGATIVE NG/ML
7AMINOCLONAZEPAM UR QL CFM: NEGATIVE NG/ML
A-OH ALPRAZ UR QL CFM: ABNORMAL NG/ML
AMPHET UR QL CFM: NEGATIVE NG/ML
AMPHET UR QL CFM: NEGATIVE NG/ML
BUPRENORPHINE UR QL CFM: NEGATIVE NG/ML
BUTALBITAL UR QL CFM: NEGATIVE NG/ML
BZE UR QL CFM: NEGATIVE NG/ML
CODEINE UR QL CFM: NEGATIVE NG/ML
DESIPRAMINE UR QL CFM: NEGATIVE NG/ML
DESIPRAMINE UR QL CFM: NEGATIVE NG/ML
EDDP UR QL CFM: NEGATIVE NG/ML
ETHYL GLUCURONIDE UR QL CFM: NEGATIVE NG/ML
ETHYL SULFATE UR QL SCN: NEGATIVE NG/ML
EUTYLONE UR QL: NEGATIVE NG/ML
FENTANYL UR QL CFM: NEGATIVE NG/ML
GLIADIN IGG SER IA-ACNC: NEGATIVE NG/ML
GLUCOSE 30M P 50 G LAC PO SERPL-MCNC: NEGATIVE NG/ML
HYDROCODONE UR QL CFM: NORMAL NG/ML
HYDROCODONE UR QL CFM: NORMAL NG/ML
HYDROMORPHONE UR QL CFM: NEGATIVE NG/ML
IMIPRAMINE UR QL CFM: NEGATIVE NG/ML
LORAZEPAM UR QL CFM: NEGATIVE NG/ML
MDMA UR QL CFM: NEGATIVE NG/ML
ME-PHENIDATE UR QL CFM: NEGATIVE NG/ML
MEPERIDINE UR QL CFM: NEGATIVE NG/ML
METHADONE UR QL CFM: NEGATIVE NG/ML
METHAMPHET UR QL CFM: NEGATIVE NG/ML
MORPHINE UR QL CFM: NEGATIVE NG/ML
MORPHINE UR QL CFM: NEGATIVE NG/ML
NORBUPRENORPHINE UR QL CFM: NEGATIVE NG/ML
NORDIAZEPAM UR QL CFM: NEGATIVE NG/ML
NORFENTANYL UR QL CFM: NEGATIVE NG/ML
NORHYDROCODONE UR QL CFM: NORMAL NG/ML
NORHYDROCODONE UR QL CFM: NORMAL NG/ML
NORMEPERIDINE UR QL CFM: NEGATIVE NG/ML
NOROXYCODONE UR QL CFM: NEGATIVE NG/ML
OLANZAPINE QUANTIFICATION: NEGATIVE NG/ML
OPC-3373 QUANTIFICATION: NEGATIVE
OXAZEPAM UR QL CFM: NEGATIVE NG/ML
OXYCODONE UR QL CFM: NEGATIVE NG/ML
OXYMORPHONE UR QL CFM: NEGATIVE NG/ML
OXYMORPHONE UR QL CFM: NEGATIVE NG/ML
PCP UR QL CFM: NEGATIVE NG/ML
PHENOBARB UR QL CFM: NEGATIVE NG/ML
SECOBARBITAL UR QL CFM: NEGATIVE NG/ML
SL AMB 3-METHYL-FENTANYL QUANTIFICATION: NORMAL NG/ML
SL AMB 4-ANPP QUANTIFICATION: NORMAL NG/ML
SL AMB 4-FIBF QUANTIFICATION: NORMAL NG/ML
SL AMB 5F-ADB-M7 METABOLITE QUANTIFICATION: NEGATIVE NG/ML
SL AMB 7-OH-MITRAGYNINE (KRATOM ALKALOID) QUANTIFICATION: NEGATIVE NG/ML
SL AMB AB-FUBINACA-M3 METABOLITE QUANTIFICATION: NEGATIVE NG/ML
SL AMB ACETYL FENTANYL QUANTIFICATION: NORMAL NG/ML
SL AMB ACETYL NORFENTANYL QUANTIFICATION: NORMAL NG/ML
SL AMB ACRYL FENTANYL QUANTIFICATION: NORMAL NG/ML
SL AMB BUTRYL FENTANYL QUANTIFICATION: NORMAL NG/ML
SL AMB CARFENTANIL QUANTIFICATION: NORMAL NG/ML
SL AMB CLOZAPINE QUANTIFICATION: NEGATIVE NG/ML
SL AMB CTHC (MARIJUANA METABOLITE) QUANTIFICATION: NEGATIVE NG/ML
SL AMB CYCLOPROPYL FENTANYL QUANTIFICATION: NORMAL NG/ML
SL AMB DEXTROMETHORPHAN QUANTIFICATION: NEGATIVE NG/ML
SL AMB DEXTRORPHAN (DEXTROMETHORPHAN METABOLITE) QUANT: NEGATIVE NG/ML
SL AMB DEXTRORPHAN (DEXTROMETHORPHAN METABOLITE) QUANT: NEGATIVE NG/ML
SL AMB FURANYL FENTANYL QUANTIFICATION: NORMAL NG/ML
SL AMB HALOPERIDOL  QUANTIFICATION: NEGATIVE NG/ML
SL AMB HALOPERIDOL METABOLITE QUANTIFICATION: NEGATIVE NG/ML
SL AMB HYDROXYRISPERIDONE QUANTIFICATION: NEGATIVE NG/ML
SL AMB JWH018 METABOLITE QUANTIFICATION: NEGATIVE NG/ML
SL AMB JWH073 METABOLITE QUANTIFICATION: NEGATIVE NG/ML
SL AMB MDMB-FUBINACA-M1 METABOLITE QUANTIFICATION: NEGATIVE NG/ML
SL AMB METHOXYACETYL FENTANYL QUANTIFICATION: NORMAL NG/ML
SL AMB METHYLONE QUANTIFICATION: NEGATIVE NG/ML
SL AMB N-DESMETHYL U-47700 QUANTIFICATION: NORMAL NG/ML
SL AMB N-DESMETHYL-TRAMADOL QUANTIFICATION: NEGATIVE NG/ML
SL AMB N-DESMETHYLCLOZAPINE QUANTIFICATION: NEGATIVE NG/ML
SL AMB NORQUETIAPINE QUANTIFICATION: NEGATIVE NG/ML
SL AMB PHENTERMINE QUANTIFICATION: NEGATIVE NG/ML
SL AMB QUETIAPINE QUANTIFICATION: NEGATIVE NG/ML
SL AMB RCS4 METABOLITE QUANTIFICATION: NEGATIVE NG/ML
SL AMB RISPERIDONE QUANTIFICATION: NEGATIVE NG/ML
SL AMB RITALINIC ACID QUANTIFICATION: NEGATIVE NG/ML
SL AMB U-47700 QUANTIFICATION: NORMAL NG/ML
SPECIMEN DRAWN SERPL: NEGATIVE NG/ML
TAPENTADOL UR QL CFM: ABNORMAL NG/ML
TEMAZEPAM UR QL CFM: NEGATIVE NG/ML
TEMAZEPAM UR QL CFM: NEGATIVE NG/ML
TRAMADOL UR QL CFM: NEGATIVE NG/ML
URATE/CREAT 24H UR: NEGATIVE NG/ML

## 2022-04-14 NOTE — TELEPHONE ENCOUNTER
Sorry this was never routed back to me  Can you let the patient know that prior auth was done so hopefully the pharmacy can now fill the RXs that are on file  I can send new if the pharmacy needs new RXs for [address]

## 2022-04-14 NOTE — TELEPHONE ENCOUNTER
Left voice message and call back number advising pt that script was sent to pharmacy and to reach out with any additional questions/concerns

## 2022-07-01 ENCOUNTER — OFFICE VISIT (OUTPATIENT)
Dept: PAIN MEDICINE | Facility: CLINIC | Age: 57
End: 2022-07-01
Payer: COMMERCIAL

## 2022-07-01 VITALS — BODY MASS INDEX: 26.88 KG/M2 | HEIGHT: 69 IN

## 2022-07-01 DIAGNOSIS — G89.4 CHRONIC PAIN SYNDROME: Primary | ICD-10-CM

## 2022-07-01 DIAGNOSIS — M54.12 CERVICAL RADICULOPATHY: ICD-10-CM

## 2022-07-01 DIAGNOSIS — M54.16 LUMBAR RADICULOPATHY: ICD-10-CM

## 2022-07-01 DIAGNOSIS — Z79.891 ENCOUNTER FOR LONG-TERM OPIATE ANALGESIC USE: ICD-10-CM

## 2022-07-01 DIAGNOSIS — F11.20 UNCOMPLICATED OPIOID DEPENDENCE (HCC): ICD-10-CM

## 2022-07-01 DIAGNOSIS — M47.812 CERVICAL SPONDYLOSIS WITHOUT MYELOPATHY: ICD-10-CM

## 2022-07-01 DIAGNOSIS — M48.04 THORACIC SPINAL STENOSIS: ICD-10-CM

## 2022-07-01 DIAGNOSIS — M47.816 LUMBAR SPONDYLOSIS: ICD-10-CM

## 2022-07-01 DIAGNOSIS — M96.1 POST LAMINECTOMY SYNDROME: ICD-10-CM

## 2022-07-01 DIAGNOSIS — M79.18 MYOFASCIAL PAIN SYNDROME: ICD-10-CM

## 2022-07-01 DIAGNOSIS — M48.061 SPINAL STENOSIS OF LUMBAR REGION, UNSPECIFIED WHETHER NEUROGENIC CLAUDICATION PRESENT: ICD-10-CM

## 2022-07-01 DIAGNOSIS — Z79.891 LONG-TERM CURRENT USE OF OPIATE ANALGESIC: ICD-10-CM

## 2022-07-01 PROCEDURE — 99214 OFFICE O/P EST MOD 30 MIN: CPT | Performed by: NURSE PRACTITIONER

## 2022-07-01 RX ORDER — HYDROCODONE BITARTRATE AND ACETAMINOPHEN 5; 325 MG/1; MG/1
TABLET ORAL
Qty: 50 TABLET | Refills: 0 | Status: SHIPPED | OUTPATIENT
Start: 2022-08-03 | End: 2022-10-03 | Stop reason: SDUPTHER

## 2022-07-01 RX ORDER — HYDROCODONE BITARTRATE AND ACETAMINOPHEN 5; 325 MG/1; MG/1
TABLET ORAL
Qty: 50 TABLET | Refills: 0 | Status: SHIPPED | OUTPATIENT
Start: 2022-09-01 | End: 2022-10-03 | Stop reason: SDUPTHER

## 2022-07-01 RX ORDER — TAPENTADOL HYDROCHLORIDE 100 MG/1
TABLET, FILM COATED, EXTENDED RELEASE ORAL
Qty: 30 TABLET | Refills: 0 | Status: SHIPPED | OUTPATIENT
Start: 2022-08-28 | End: 2022-10-03 | Stop reason: SDUPTHER

## 2022-07-01 RX ORDER — TAPENTADOL HYDROCHLORIDE 100 MG/1
TABLET, FILM COATED, EXTENDED RELEASE ORAL
Qty: 30 TABLET | Refills: 0 | Status: SHIPPED | OUTPATIENT
Start: 2022-07-30 | End: 2022-10-03 | Stop reason: SDUPTHER

## 2022-07-01 NOTE — PROGRESS NOTES
Assessment:  1  Chronic pain syndrome    2  Cervical radiculopathy    3  Lumbar radiculopathy    4  Lumbar spondylosis    5  Cervical spondylosis without myelopathy    6  Post laminectomy syndrome    7  Thoracic spinal stenosis    8  Encounter for long-term opiate analgesic use    9  Spinal stenosis of lumbar region, unspecified whether neurogenic claudication present    10  Uncomplicated opioid dependence ([facility] [address] )    11  Long-term current use of opiate analgesic    12  Myofascial pain syndrome        Plan:  1  Patient may continue Nucynta  mg daily for pain  The patient just filled a prescription today therefore will provide 2 more months worth of prescriptions but do not fill dates of July 30, 2022 and August 28, 2022    Patient may also continue Norco 5/325 mg 1 tablet twice a day as needed for breakthrough pain #50 tablets for 30 days  Patient still has a prescription on file at the pharmacy to be filled within the next few days therefore I will provide him with tumor months' worth of prescriptions would do not fill date of August 3, 2022 and September 1, 2022  [facility] Prescription Drug Monitoring Program report was reviewed and was appropriate     There are risks associated with opioid medications, including dependence, addiction and tolerance  The patient understands and agrees to use these medications only as prescribed  Potential side effects of the medications include, but are not limited to, constipation, drowsiness, addiction, impaired judgment and risk of fatal overdose if not taken as prescribed  The patient was warned against driving while taking sedation medications  Sharing medications is a felony  At this point in time, the patient is showing no signs of addiction, abuse, diversion or suicidal ideation  2  Patient will continue to follow with [employer] for periodic reprogramming sessions of a spinal cord stimulator devices as needed  3   Patient will continue to follow with neurology  4  Patient may continue gabapentin as prescribed  5  Patient will follow up in 3 months or sooner if needed    M*Modal software was used to dictate this note  It may contain errors with dictating incorrect words or incorrect spelling  Please contact the provider directly with any questions  History of Present Illness: The patient is a 64 y o  male with a history of T12 decompression and fusion with instrumentation status post [employer] spinal cord stimulator and [employer] peripheral stimulator last seen on 4/11/22 who presents for a follow up office visit in regards to chronic low back pain secondary to lumbar post-laminectomy syndrome and facial pain secondary to trigeminal neuralgia     Patient offers no new complaints today  He continues to manage his pain well with Nucynta  mg daily with Norco 5/325 mg twice a day p r n  breakthrough pain with a 60% improvement of his pain without side effects  He rates his pain a 5/10 on the numerical pain rating scale  He intermittently has pain in the morning and at night which is described as burning, dull aching, sharp, throbbing, cramping, pressure-like, shooting, numbness and pins and needles      Pain Contract Signed: 1/17/22  Last Urine Drug Screen: 4/11/22  Last Norco per pt 7/1/22  Last pill count: 8/13/21  Last Narcan: 4/11/22  Opioid Agreement 1/17/22  Garg/SOAAP screening 1/17/22    I have personally reviewed and/or updated the patient's past medical history, past surgical history, family history, social history, current medications, allergies, and vital signs today  Review of Systems:    Review of Systems   Respiratory: Negative for shortness of breath  Cardiovascular: Negative for chest pain  Gastrointestinal: Negative for constipation, diarrhea, nausea and vomiting  Musculoskeletal: Negative for arthralgias, gait problem, joint swelling and myalgias  Skin: Negative for rash     Neurological: Negative for dizziness, seizures and weakness  All other systems reviewed and are negative  History reviewed  No pertinent past medical history  History reviewed  No pertinent surgical history  History reviewed  No pertinent family history      Social History     Occupational History    Not on file   Tobacco Use    Smoking status: Current Every Day Smoker     Types: Cigarettes    Smokeless tobacco: Never Used   Substance and Sexual Activity    Alcohol use: No    Drug use: No    Sexual activity: Not on file         Current Outpatient Medications:     [START ON 9/1/2022] HYDROcodone-acetaminophen (NORCO) 5-325 mg per tablet, Take 1 PO BID PRN for breakthrough pain , Disp: 50 tablet, Rfl: 0    [START ON 8/3/2022] HYDROcodone-acetaminophen (NORCO) 5-325 mg per tablet, Take 1 PO BID PRN for break through pain, Disp: 50 tablet, Rfl: 0    [START ON 8/28/2022] Tapentadol HCl ER (Nucynta ER) 100 MG TB12, Take 1 PO BID for pain , Disp: 30 tablet, Rfl: 0    [START ON 7/30/2022] Tapentadol HCl ER (Nucynta ER) 100 MG TB12, Take 1 PO QD for pain , Disp: 30 tablet, Rfl: 0    ALPRAZolam (XANAX) 0 25 mg tablet, Take 0 25 mg by mouth Three times daily as needed (Patient not taking: Reported on 4/11/2022 ), Disp: , Rfl:     amLODIPine (NORVASC) 2 5 mg tablet, Take 2 5 mg by mouth daily, Disp: , Rfl:     atorvastatin (LIPITOR) 80 mg tablet, Take by mouth, Disp: , Rfl:     baclofen 10 mg tablet, Take 10 mg by mouth as needed, Disp: , Rfl:     buPROPion (WELLBUTRIN SR) 150 mg 12 hr tablet, Take 1 tablet by mouth 2 (two) times a day, Disp: , Rfl:     CHANTIX STARTING MONTH ELÍAS 0 5 MG X 11 & 1 MG X 42 tablet, Take by mouth daily As directed, Disp: , Rfl: 0    diclofenac sodium (VOLTAREN) 1 %, Apply 1 application topically 4 (four) times a day, Disp: , Rfl:     gabapentin (NEURONTIN) 600 MG tablet, Take 1 tablet by mouth 2 (two) times a day, Disp: , Rfl:     gabapentin (NEURONTIN) 800 mg tablet, Take 1 tablet by mouth 2 (two) times a day, Disp: , Rfl:     HYDROcodone-acetaminophen (NORCO) 5-325 mg per tablet, Take 1 PO BID PRN for breakthrough pain  DO NOT FILL UNTIL 5/25/22, Disp: 50 tablet, Rfl: 0    lamoTRIgine (LaMICtal) 100 MG TBDP, Take 100 mg by mouth daily, Disp: , Rfl:     LamoTRIgine 25 (21)-50 (7) MG KIT, USE 1 KIT AS DIRECTED, Disp: , Rfl: 0    lidocaine (LIDODERM) 5 %, Apply 1 patch topically, Disp: , Rfl:     lisinopril-hydrochlorothiazide (PRINZIDE,ZESTORETIC) 20-12 5 MG per tablet, Take 1 tablet by mouth daily, Disp: , Rfl: 3    meloxicam (MOBIC) 15 mg tablet, , Disp: , Rfl:     metFORMIN (GLUCOPHAGE) 1000 MG tablet, Take 1 tablet by mouth 2 (two) times a day, Disp: , Rfl:     naloxone (NARCAN) 4 mg/0 1 mL nasal spray, Administer 1 spray into a nostril  If no response after 2-3 minutes, give another dose in the other nostril using a new spray , Disp: 1 each, Rfl: 1    OXcarbazepine (TRILEPTAL) 600 mg tablet, 2 TABLET(S) BY MOUTH TWO TIMES DAILY, Disp: , Rfl: 2    Tapentadol HCl ER (Nucynta ER) 100 MG TB12, Take 1 PO QD for pain  DO NOT FILL UNTIL 4/6/2022, Disp: 30 tablet, Rfl: 0    Allergies   Allergen Reactions    Other     Sulfa Antibiotics Edema     Annotation - [id number]: face and hands redness and swelling       Physical Exam:    Ht 5' 9" (1 753 m)   BMI 26 88 kg/m²     Constitutional:normal, well developed, well nourished, alert, in no distress and non-toxic and no overt pain behavior  Eyes:anicteric  HEENT:grossly intact  Neck:supple, symmetric, trachea midline and no masses   Pulmonary:even and unlabored  Cardiovascular:No edema or pitting edema present  Skin:Normal without rashes or lesions and well hydrated  Psychiatric:Mood and affect appropriate  Neurologic:Cranial Nerves II-XII grossly intact  Musculoskeletal:normal gait      Imaging  No orders to display         No orders of the defined types were placed in this encounter

## 2022-10-03 ENCOUNTER — OFFICE VISIT (OUTPATIENT)
Dept: PAIN MEDICINE | Facility: CLINIC | Age: 57
End: 2022-10-03
Payer: COMMERCIAL

## 2022-10-03 VITALS — BODY MASS INDEX: 26.88 KG/M2 | HEIGHT: 69 IN

## 2022-10-03 DIAGNOSIS — F11.20 UNCOMPLICATED OPIOID DEPENDENCE (HCC): ICD-10-CM

## 2022-10-03 DIAGNOSIS — M54.12 CERVICAL RADICULOPATHY: ICD-10-CM

## 2022-10-03 DIAGNOSIS — M79.18 MYOFASCIAL PAIN SYNDROME: ICD-10-CM

## 2022-10-03 DIAGNOSIS — G89.4 CHRONIC PAIN SYNDROME: Primary | ICD-10-CM

## 2022-10-03 DIAGNOSIS — M96.1 POST LAMINECTOMY SYNDROME: ICD-10-CM

## 2022-10-03 DIAGNOSIS — M54.40 CHRONIC BILATERAL LOW BACK PAIN WITH SCIATICA, SCIATICA LATERALITY UNSPECIFIED: ICD-10-CM

## 2022-10-03 DIAGNOSIS — G89.12 POST-THORACOTOMY PAIN: ICD-10-CM

## 2022-10-03 DIAGNOSIS — G89.29 CHRONIC BILATERAL LOW BACK PAIN WITH SCIATICA, SCIATICA LATERALITY UNSPECIFIED: ICD-10-CM

## 2022-10-03 DIAGNOSIS — M51.36 DEGENERATIVE DISC DISEASE, LUMBAR: ICD-10-CM

## 2022-10-03 DIAGNOSIS — M47.816 LUMBAR SPONDYLOSIS: ICD-10-CM

## 2022-10-03 DIAGNOSIS — M54.16 LUMBAR RADICULOPATHY: ICD-10-CM

## 2022-10-03 DIAGNOSIS — Z79.891 ENCOUNTER FOR LONG-TERM OPIATE ANALGESIC USE: ICD-10-CM

## 2022-10-03 DIAGNOSIS — M47.812 CERVICAL SPONDYLOSIS WITHOUT MYELOPATHY: ICD-10-CM

## 2022-10-03 DIAGNOSIS — M48.061 SPINAL STENOSIS OF LUMBAR REGION, UNSPECIFIED WHETHER NEUROGENIC CLAUDICATION PRESENT: ICD-10-CM

## 2022-10-03 PROCEDURE — 99214 OFFICE O/P EST MOD 30 MIN: CPT | Performed by: NURSE PRACTITIONER

## 2022-10-03 PROCEDURE — 80305 DRUG TEST PRSMV DIR OPT OBS: CPT | Performed by: NURSE PRACTITIONER

## 2022-10-03 RX ORDER — TAPENTADOL HYDROCHLORIDE 100 MG/1
TABLET, FILM COATED, EXTENDED RELEASE ORAL
Qty: 30 TABLET | Refills: 0 | Status: SHIPPED | OUTPATIENT
Start: 2022-10-25

## 2022-10-03 RX ORDER — HYDROCODONE BITARTRATE AND ACETAMINOPHEN 5; 325 MG/1; MG/1
TABLET ORAL
Qty: 50 TABLET | Refills: 0 | Status: SHIPPED | OUTPATIENT
Start: 2022-10-25

## 2022-10-03 RX ORDER — HYDROCODONE BITARTRATE AND ACETAMINOPHEN 5; 325 MG/1; MG/1
TABLET ORAL
Qty: 50 TABLET | Refills: 0 | Status: SHIPPED | OUTPATIENT
Start: 2022-12-22

## 2022-10-03 RX ORDER — TAPENTADOL HYDROCHLORIDE 100 MG/1
TABLET, FILM COATED, EXTENDED RELEASE ORAL
Qty: 30 TABLET | Refills: 0 | Status: SHIPPED | OUTPATIENT
Start: 2022-11-23

## 2022-10-03 RX ORDER — TAPENTADOL HYDROCHLORIDE 100 MG/1
TABLET, FILM COATED, EXTENDED RELEASE ORAL
Qty: 30 TABLET | Refills: 0 | Status: SHIPPED | OUTPATIENT
Start: 2022-12-22

## 2022-10-03 RX ORDER — HYDROCODONE BITARTRATE AND ACETAMINOPHEN 5; 325 MG/1; MG/1
TABLET ORAL
Qty: 50 TABLET | Refills: 0 | Status: SHIPPED | OUTPATIENT
Start: 2022-11-23

## 2022-10-03 NOTE — PROGRESS NOTES
Assessment:  1  Chronic pain syndrome    2  Cervical radiculopathy    3  Lumbar radiculopathy    4  Degenerative disc disease, lumbar    5  Lumbar spondylosis    6  Cervical spondylosis without myelopathy    7  Chronic bilateral low back pain with sciatica, sciatica laterality unspecified    8  Uncomplicated opioid dependence ([address] )    9  Post laminectomy syndrome    10  Post-thoracotomy pain    11  Encounter for long-term opiate analgesic use    12  Spinal stenosis of lumbar region, unspecified whether neurogenic claudication present    13  Myofascial pain syndrome        Plan:  1  Discussed repeating bilateral L3-5 medial branch blocks  Patient 1st has a work obligations to sort out, however will call if he wishes to schedule for blocks prior to his next appointment   2  Patient may continue Nucynta  mg daily for pain and Norco 5/325 mg b i d  p r n  breakthrough pain #50 tablets for 30 days  The patient was given a 3 month supply of both prescriptions with a Do Not Fill date(s) of October 25, 2022, November 23, 2022 and December 22, 2022    [place] Prescription Drug Monitoring Program report was reviewed and was appropriate     A urine drug screen was collected at today's office visit as part of our medication management protocol  The point of care testing results were appropriate for what was being prescribed  The specimen will be sent for confirmatory testing  The drug screen is medically necessary because the patient is either dependent on opioid medication or is being considered for opioid medication therapy and the results could impact ongoing or future treatment  The drug screen is to evaluate for the presences or absence of prescribed, non-prescribed, and/or illicit drugs/substances  There are risks associated with opioid medications, including dependence, addiction and tolerance  The patient understands and agrees to use these medications only as prescribed   Potential side effects of the medications include, but are not limited to, constipation, drowsiness, addiction, impaired judgment and risk of fatal overdose if not taken as prescribed  The patient was warned against driving while taking sedation medications  Sharing medications is a felony  At this point in time, the patient is showing no signs of addiction, abuse, diversion or suicidal ideation  3  Patient may continue gabapentin and baclofen as prescribed   4  Patient will continue to follow with [employer] for periodic reprogramming sessions of spinal cord stimulator devices as needed  5  Continue to follow with Neurology as scheduled  6  Continue with home exercise program  7  Follow-up in 3 months or sooner if needed    History of Present Illness: The patient is a 64 y o  male with a history of T12 decompression and fusion with instrumentation status post [employer] spinal cord stimulator and [employer] peripheral stimulator last seen on 7/1/22 who presents for a follow up office visit in regards to chronic axial low back pain secondary to lumbar post-laminectomy syndrome and facial pain secondary to trigeminal neuralgia  The patient denies bowel or bladder incontinence or saddle anesthesia  Patient unfortunately has not had much relief in the past with lumbar radiofrequency ablation or lumbar epidural steroid injections  He does request to potentially repeat lumbar radiofrequency ablation  He continues to manage his pain with Nucynta  mg daily and Norco 5/325 mg twice a day p r n  breakthrough pain with 60% improvement of his pain without side effects  He rates his pain a 5/10 on the numeric pain rating scale    He constantly has pain in the morning and in the evening which is described as burning, dull aching, sharp, throbbing, cramping, pressure-like, shooting, numbness and pins and needles    Pain Contract Signed: 1/17/22  Last Urine Drug Screen: 10/3/22  Last Norco,Nucynta per pt 10/3/22  Last pill count: 8/13/21  Last Narcan: 4/11/22  Opioid Agreement 1/17/22  Garg/SOAAP screening 1/17/22    I have personally reviewed and/or updated the patient's past medical history, past surgical history, family history, social history, current medications, allergies, and vital signs today  Review of Systems:    Review of Systems   Respiratory: Negative for shortness of breath  Cardiovascular: Negative for chest pain  Gastrointestinal: Negative for constipation, diarrhea, nausea and vomiting  Musculoskeletal: Negative for arthralgias, gait problem, joint swelling and myalgias  Skin: Negative for rash  Neurological: Negative for dizziness, seizures and weakness  All other systems reviewed and are negative  No past medical history on file  No past surgical history on file  No family history on file  Social History     Occupational History    Not on file   Tobacco Use    Smoking status: Current Every Day Smoker     Types: Cigarettes    Smokeless tobacco: Never Used   Substance and Sexual Activity    Alcohol use: No    Drug use: No    Sexual activity: Not on file         Current Outpatient Medications:     [START ON 12/22/2022] HYDROcodone-acetaminophen (NORCO) 5-325 mg per tablet, Take 1 PO BID PRN for breakthrough pain  Do not start before December 22, 2022 , Disp: 50 tablet, Rfl: 0    [START ON 11/23/2022] HYDROcodone-acetaminophen (NORCO) 5-325 mg per tablet, Take 1 PO BID PRN for breakthrough pain  Do not start before November 23, 2022 , Disp: 50 tablet, Rfl: 0    [START ON 10/25/2022] HYDROcodone-acetaminophen (NORCO) 5-325 mg per tablet, Take 1 PO BID PRN for break through pain Do not start before October 25, 2022 , Disp: 50 tablet, Rfl: 0    [START ON 12/22/2022] Tapentadol HCl ER (Nucynta ER) 100 MG TB12, Take 1 PO QD for pain   Do not start before December 22, 2022 , Disp: 30 tablet, Rfl: 0    [START ON 11/23/2022] Tapentadol HCl ER (Nucynta ER) 100 MG TB12, Take 1 PO BID for pain  Do not start before November 23, 2022 , Disp: 30 tablet, Rfl: 0    [START ON 10/25/2022] Tapentadol HCl ER (Nucynta ER) 100 MG TB12, Take 1 PO QD for pain  Do not start before October 25, 2022 , Disp: 30 tablet, Rfl: 0    ALPRAZolam (XANAX) 0 25 mg tablet, Take 0 25 mg by mouth Three times daily as needed (Patient not taking: Reported on 4/11/2022 ), Disp: , Rfl:     amLODIPine (NORVASC) 2 5 mg tablet, Take 2 5 mg by mouth daily, Disp: , Rfl:     atorvastatin (LIPITOR) 80 mg tablet, Take by mouth, Disp: , Rfl:     baclofen 10 mg tablet, Take 10 mg by mouth as needed, Disp: , Rfl:     buPROPion (WELLBUTRIN SR) 150 mg 12 hr tablet, Take 1 tablet by mouth 2 (two) times a day, Disp: , Rfl:     CHANTIX STARTING MONTH ELÍAS 0 5 MG X 11 & 1 MG X 42 tablet, Take by mouth daily As directed, Disp: , Rfl: 0    diclofenac sodium (VOLTAREN) 1 %, Apply 1 application topically 4 (four) times a day, Disp: , Rfl:     gabapentin (NEURONTIN) 600 MG tablet, Take 1 tablet by mouth 2 (two) times a day, Disp: , Rfl:     gabapentin (NEURONTIN) 800 mg tablet, Take 1 tablet by mouth 2 (two) times a day, Disp: , Rfl:     lamoTRIgine (LaMICtal) 100 MG TBDP, Take 100 mg by mouth daily, Disp: , Rfl:     LamoTRIgine 25 (21)-50 (7) MG KIT, USE 1 KIT AS DIRECTED, Disp: , Rfl: 0    lidocaine (LIDODERM) 5 %, Apply 1 patch topically, Disp: , Rfl:     lisinopril-hydrochlorothiazide (PRINZIDE,ZESTORETIC) 20-12 5 MG per tablet, Take 1 tablet by mouth daily, Disp: , Rfl: 3    meloxicam (MOBIC) 15 mg tablet, , Disp: , Rfl:     metFORMIN (GLUCOPHAGE) 1000 MG tablet, Take 1 tablet by mouth 2 (two) times a day, Disp: , Rfl:     naloxone (NARCAN) 4 mg/0 1 mL nasal spray, Administer 1 spray into a nostril   If no response after 2-3 minutes, give another dose in the other nostril using a new spray , Disp: 1 each, Rfl: 1    OXcarbazepine (TRILEPTAL) 600 mg tablet, 2 TABLET(S) BY MOUTH TWO TIMES DAILY, Disp: , Rfl: 2    Allergies Allergen Reactions    Other     Sulfa Antibiotics Edema     Annotation - [id number]: face and hands redness and swelling       Physical Exam:    Ht 5' 9" (1 753 m)   BMI 26 88 kg/m²     Constitutional:normal, well developed, well nourished, alert, in no distress and non-toxic and no overt pain behavior  Eyes:anicteric  HEENT:grossly intact  Neck:supple, symmetric, trachea midline and no masses   Pulmonary:even and unlabored  Cardiovascular:No edema or pitting edema present  Skin:Normal without rashes or lesions and well hydrated  Psychiatric:Mood and affect appropriate  Neurologic:Cranial Nerves II-XII grossly intact  Musculoskeletal:normal gait  Bilateral lumbar paraspinal musculature mildly tender to palpation        Imaging  No orders to display         Orders Placed This Encounter   Procedures    MM ALL_Prescribed Meds and Special Instructions    MM DT_Alprazolam Definitive Test    MM DT_Amphetamine Definitive Test    MM DT_Aripiprazole Definitive Test    MM DT_Bath Salts Definitive Test    MM DT_Buprenorphine Definitive Test    MM DT_Butalbital Definitive Test    MM DT_Clonazepam Definitive Test    MM DT_Clozapine Definitive Test    MM DT_Cocaine Definitive Test    MM DT_Codeine Definitive Test    MM DT_Desipramine Definitive Test    MM DT_Dextromethorphan Definitive Test    MM Diazepam Definitive Test    MM DT_Ethyl Glucuronide/Ethyl Sulfate Definitive Test    MM DT_Fentanyl Definitive Test    MM DT_Haloperidol Definitive Test    MM DT_Heroin Definitive Test    MM DT_Hydrocodone Definitive Test    MM DT_Hydromorphone Definitive Test    MM DT_Imipramine Definitive Test    MM DT_Kratom Definitive Test    MM DT_Levorphanol Definitive Test    MM Lorazepam Definitive Test    MM DT_MDMA Definitive Test    MM DT_Meperidine Definitive Test    MM DT_Methadone Definitive Test    MM DT_Methamphetamine Definitive Test    MM DT_Morphine Definitive Test    MM DT_Olanzapine Definitive Test  MM DT_Oxazepam Definitive Test    MM DT_Oxycodone Definitive Test    MM DT_Oxymorphone Definitive Test    MM DT_Phencyclidine Definitive Test    MM DT_Phenobarbital Definitive Test    MM DT_Phentermine Definitive Test    MM DT_Quetiapine Definitive Test    MM DT_Risperidone Definitive Test    MM DT_Secobarbital Definitive Test    MM DT_Spice Definitive Test    MM DT_Tapentadol Definitive Test    MM DT_Temazapam Definitive Test    MM DT_THC Definitive Test    MM DT_Tramadol Definitive Test    MM DT_Methylphenidate Definitive Test

## 2022-10-05 LAB
6MAM UR QL CFM: NEGATIVE NG/ML
7AMINOCLONAZEPAM UR QL CFM: NEGATIVE NG/ML
A-OH ALPRAZ UR QL CFM: ABNORMAL NG/ML
AMPHET UR QL CFM: NEGATIVE NG/ML
AMPHET UR QL CFM: NEGATIVE NG/ML
BUPRENORPHINE UR QL CFM: NEGATIVE NG/ML
BUTALBITAL UR QL CFM: NEGATIVE NG/ML
BZE UR QL CFM: NEGATIVE NG/ML
CODEINE UR QL CFM: NEGATIVE NG/ML
DESIPRAMINE UR QL CFM: NEGATIVE NG/ML
DESIPRAMINE UR QL CFM: NEGATIVE NG/ML
EDDP UR QL CFM: NEGATIVE NG/ML
ETHYL GLUCURONIDE UR QL CFM: NEGATIVE NG/ML
ETHYL SULFATE UR QL SCN: NEGATIVE NG/ML
EUTYLONE UR QL: NEGATIVE NG/ML
FENTANYL UR QL CFM: NEGATIVE NG/ML
GLIADIN IGG SER IA-ACNC: NEGATIVE NG/ML
GLUCOSE 30M P 50 G LAC PO SERPL-MCNC: NEGATIVE NG/ML
HYDROCODONE UR QL CFM: NORMAL NG/ML
HYDROCODONE UR QL CFM: NORMAL NG/ML
HYDROMORPHONE UR QL CFM: NORMAL NG/ML
IMIPRAMINE UR QL CFM: NEGATIVE NG/ML
LORAZEPAM UR QL CFM: NEGATIVE NG/ML
MDMA UR QL CFM: NEGATIVE NG/ML
ME-PHENIDATE UR QL CFM: NEGATIVE NG/ML
MEPERIDINE UR QL CFM: NEGATIVE NG/ML
METHADONE UR QL CFM: NEGATIVE NG/ML
METHAMPHET UR QL CFM: NEGATIVE NG/ML
MORPHINE UR QL CFM: NEGATIVE NG/ML
MORPHINE UR QL CFM: NEGATIVE NG/ML
NORBUPRENORPHINE UR QL CFM: NEGATIVE NG/ML
NORDIAZEPAM UR QL CFM: NEGATIVE NG/ML
NORFENTANYL UR QL CFM: NEGATIVE NG/ML
NORHYDROCODONE UR QL CFM: NORMAL NG/ML
NORHYDROCODONE UR QL CFM: NORMAL NG/ML
NORMEPERIDINE UR QL CFM: NEGATIVE NG/ML
NOROXYCODONE UR QL CFM: NEGATIVE NG/ML
OLANZAPINE QUANTIFICATION: NEGATIVE NG/ML
OPC-3373 QUANTIFICATION: NEGATIVE
OXAZEPAM UR QL CFM: NEGATIVE NG/ML
OXYCODONE UR QL CFM: NEGATIVE NG/ML
OXYMORPHONE UR QL CFM: NEGATIVE NG/ML
OXYMORPHONE UR QL CFM: NEGATIVE NG/ML
PARA-FLUOROFENTANYL QUANTIFICATION: NORMAL NG/ML
PCP UR QL CFM: NEGATIVE NG/ML
PHENOBARB UR QL CFM: NEGATIVE NG/ML
RESULT ALL_PRESCRIBED MEDS AND SPECIAL INSTRUCTIONS: NORMAL
SECOBARBITAL UR QL CFM: NEGATIVE NG/ML
SL AMB 4-ANPP QUANTIFICATION: NORMAL NG/ML
SL AMB 5F-ADB-M7 METABOLITE QUANTIFICATION: NEGATIVE NG/ML
SL AMB 7-OH-MITRAGYNINE (KRATOM ALKALOID) QUANTIFICATION: NEGATIVE NG/ML
SL AMB AB-FUBINACA-M3 METABOLITE QUANTIFICATION: NEGATIVE NG/ML
SL AMB ACETYL FENTANYL QUANTIFICATION: NORMAL NG/ML
SL AMB ACETYL NORFENTANYL QUANTIFICATION: NORMAL NG/ML
SL AMB ACRYL FENTANYL QUANTIFICATION: NORMAL NG/ML
SL AMB CARFENTANIL QUANTIFICATION: NORMAL NG/ML
SL AMB CLOZAPINE QUANTIFICATION: NEGATIVE NG/ML
SL AMB CTHC (MARIJUANA METABOLITE) QUANTIFICATION: NEGATIVE NG/ML
SL AMB DEXTROMETHORPHAN QUANTIFICATION: NEGATIVE NG/ML
SL AMB DEXTRORPHAN (DEXTROMETHORPHAN METABOLITE) QUANT: NEGATIVE NG/ML
SL AMB DEXTRORPHAN (DEXTROMETHORPHAN METABOLITE) QUANT: NEGATIVE NG/ML
SL AMB HALOPERIDOL  QUANTIFICATION: NEGATIVE NG/ML
SL AMB HALOPERIDOL METABOLITE QUANTIFICATION: NEGATIVE NG/ML
SL AMB HYDROXYRISPERIDONE QUANTIFICATION: NEGATIVE NG/ML
SL AMB JWH018 METABOLITE QUANTIFICATION: NEGATIVE NG/ML
SL AMB JWH073 METABOLITE QUANTIFICATION: NEGATIVE NG/ML
SL AMB MDMB-FUBINACA-M1 METABOLITE QUANTIFICATION: NEGATIVE NG/ML
SL AMB METHYLONE QUANTIFICATION: NEGATIVE NG/ML
SL AMB N-DESMETHYL-TRAMADOL QUANTIFICATION: NEGATIVE NG/ML
SL AMB N-DESMETHYLCLOZAPINE QUANTIFICATION: NEGATIVE NG/ML
SL AMB NORQUETIAPINE QUANTIFICATION: NEGATIVE NG/ML
SL AMB PHENTERMINE QUANTIFICATION: NEGATIVE NG/ML
SL AMB QUETIAPINE QUANTIFICATION: NEGATIVE NG/ML
SL AMB RCS4 METABOLITE QUANTIFICATION: NEGATIVE NG/ML
SL AMB RISPERIDONE QUANTIFICATION: NEGATIVE NG/ML
SL AMB RITALINIC ACID QUANTIFICATION: NEGATIVE NG/ML
SPECIMEN DRAWN SERPL: NEGATIVE NG/ML
TAPENTADOL UR QL CFM: NORMAL NG/ML
TEMAZEPAM UR QL CFM: NEGATIVE NG/ML
TEMAZEPAM UR QL CFM: NEGATIVE NG/ML
TRAMADOL UR QL CFM: NEGATIVE NG/ML
URATE/CREAT 24H UR: NEGATIVE NG/ML

## 2022-12-16 NOTE — PROGRESS NOTES
Assessment:  1  Chronic pain syndrome    2  Cervical radiculopathy    3  Lumbar radiculopathy    4  Other spondylosis with myelopathy, thoracic region    5  Trigeminal neuralgia    6  Lumbar spondylosis    7  Cervical spondylosis without myelopathy    8  Uncomplicated opioid dependence ([address] )    9  Myofascial pain    10  Post laminectomy syndrome    11  Post-thoracotomy pain    12  Thoracic spinal stenosis    13  Encounter for long-term opiate analgesic use    14  Spinal stenosis of lumbar region, unspecified whether neurogenic claudication present    15  Long-term current use of opiate analgesic        Plan:  1  I will order an updated CT of the cervical spine as patient has abnormal spinal reflex on exam  2  I will initiate the patient in physical therapy for cervical complaints I feel he may benefit for Ahmet-based exercises  3  Patient may continue Nucynta  mg daily as prescribed  The patient was given a 2 month supply of prescriptions with a Do Not Fill date(s) of December 27, 2022 and January 25, 2023  Patient may also continue Norco 5/325 mg 1 tablet 1-2 times a day as needed for pain #50 tablets for 30 days  Patient still has a prescription on file dated for December 22, 2022 therefore I will provide him 1 more month worth of medication with a do not fill date of January 20, 2023    [facility] report was reviewed and was appropriate     There are risks associated with opioid medications, including dependence, addiction and tolerance  The patient understands and agrees to use these medications only as prescribed  Potential side effects of the medications include, but are not limited to, constipation, drowsiness, addiction, impaired judgment and risk of fatal overdose if not taken as prescribed  The patient was warned against driving while taking sedation medications  Sharing medications is a felony   At this point in time, the patient is showing no signs of addiction, abuse, diversion or suicidal ideation  4   Continue gabapentin and baclofen as prescribed  5  Continue to follow with [email or web address] and [employer] for periodic reprogramming of his spinal cord stimulator devices as needed  6  Continueto follow with neurology as scheduled  7  Follow-up in 8 weeks or sooner if needed    History of Present Illness: The patient is a 62 y o  male with a history of T12 decompression and fusion status post [place] spinal cord stimulator and [employer] peripheral stimulator last seen on 10/3/22 who presents for a follow up office visit in regards to chronic axial low back pain secondary to postlaminectomy syndrome and chronic pain syndrome  The patient denies bowel or bladder incontinence or saddle anesthesia  Unfortunately the patient has not had much relief in the past with lumbar radiofrequency ablation or lumbar epidural steroid injections  He is managing his pain with Nucynta  mg daily and Norco 5/325 mg twice a day as needed breakthrough pain with a 60% improvement of his pain without side effects  Patient also has new complaint of neck pain that radiates into the right upper extremity with associated numbness and tingling in no specific dermatomal distribution   He is currently involved in physical therapy for his shoulder through [facility]  I do not have any updated imaging of the cervical spine    The patient rates his pain of 5 out of 10 on the numeric pain rating scale    He currently has pain in the morning and at night which is described as burning, dull aching, sharp, throbbing, cramping, pressure-like, shooting, numbness and pins-and-needles    Pain Contract Signed: 1/17/22  Last Urine Drug Screen: 10/3/22  Last Norco,Nucynta per pt 12/19/22  Last pill count: 8/13/21  Last Narcan: 4/11/22  Opioid Agreement 1/17/22  Beck/SOAAP screening 1/17/22    I have personally reviewed and/or updated the patient's past medical history, past surgical history, family history, social history, current medications, allergies, and vital signs today  Review of Systems:    Review of Systems   Respiratory: Negative for shortness of breath  Cardiovascular: Negative for chest pain  Gastrointestinal: Negative for constipation, diarrhea, nausea and vomiting  Musculoskeletal: Negative for arthralgias, gait problem, joint swelling and myalgias  Skin: Negative for rash  Neurological: Negative for dizziness, seizures and weakness  All other systems reviewed and are negative  No past medical history on file  No past surgical history on file  No family history on file  Social History     Occupational History   • Not on file   Tobacco Use   • Smoking status: Every Day     Types: Cigarettes   • Smokeless tobacco: Never   Substance and Sexual Activity   • Alcohol use: No   • Drug use: No   • Sexual activity: Not on file         Current Outpatient Medications:   •  atorvastatin (LIPITOR) 80 mg tablet, Take by mouth, Disp: , Rfl:   •  gabapentin (NEURONTIN) 600 MG tablet, Take 1 tablet by mouth 2 (two) times a day, Disp: , Rfl:   •  gabapentin (NEURONTIN) 800 mg tablet, Take 1 tablet by mouth 2 (two) times a day, Disp: , Rfl:   •  HYDROcodone-acetaminophen (NORCO) 5-325 mg per tablet, Take 1 PO BID PRN for break through pain Do not start before October 25, 2022 , Disp: 50 tablet, Rfl: 0  •  lamoTRIgine (LaMICtal) 100 MG TBDP, Take 100 mg by mouth daily, Disp: , Rfl:   •  lisinopril-hydrochlorothiazide (PRINZIDE,ZESTORETIC) 20-12 5 MG per tablet, Take 1 tablet by mouth daily, Disp: , Rfl: 3  •  meloxicam (MOBIC) 15 mg tablet, , Disp: , Rfl:   •  metFORMIN (GLUCOPHAGE) 1000 MG tablet, Take 1 tablet by mouth 2 (two) times a day, Disp: , Rfl:   •  naloxone (NARCAN) 4 mg/0 1 mL nasal spray, Administer 1 spray into a nostril   If no response after 2-3 minutes, give another dose in the other nostril using a new spray , Disp: 1 each, Rfl: 1  • Tapentadol HCl ER (Nucynta ER) 100 MG TB12, Take 1 PO QD for pain  Do not start before October 25, 2022 , Disp: 30 tablet, Rfl: 0  •  ALPRAZolam (XANAX) 0 25 mg tablet, Take 0 25 mg by mouth Three times daily as needed (Patient not taking: Reported on 4/11/2022 ), Disp: , Rfl:   •  amLODIPine (NORVASC) 2 5 mg tablet, Take 2 5 mg by mouth daily, Disp: , Rfl:   •  baclofen 10 mg tablet, Take 10 mg by mouth as needed, Disp: , Rfl:   •  buPROPion (WELLBUTRIN SR) 150 mg 12 hr tablet, Take 1 tablet by mouth 2 (two) times a day, Disp: , Rfl:   •  CHANTIX STARTING MONTH ELÍAS 0 5 MG X 11 & 1 MG X 42 tablet, Take by mouth daily As directed, Disp: , Rfl: 0  •  diclofenac sodium (VOLTAREN) 1 %, Apply 1 application topically 4 (four) times a day, Disp: , Rfl:   •  [START ON 12/22/2022] HYDROcodone-acetaminophen (NORCO) 5-325 mg per tablet, Take 1 PO BID PRN for breakthrough pain  Do not start before December 22, 2022 , Disp: 50 tablet, Rfl: 0  •  HYDROcodone-acetaminophen (NORCO) 5-325 mg per tablet, Take 1 PO BID PRN for breakthrough pain  Do not start before November 23, 2022 , Disp: 50 tablet, Rfl: 0  •  LamoTRIgine 25 (21)-50 (7) MG KIT, USE 1 KIT AS DIRECTED, Disp: , Rfl: 0  •  lidocaine (LIDODERM) 5 %, Apply 1 patch topically, Disp: , Rfl:   •  OXcarbazepine (TRILEPTAL) 600 mg tablet, 2 TABLET(S) BY MOUTH TWO TIMES DAILY, Disp: , Rfl: 2  •  [START ON 12/22/2022] Tapentadol HCl ER (Nucynta ER) 100 MG TB12, Take 1 PO QD for pain  Do not start before December 22, 2022  (Patient not taking: Reported on 12/19/2022 Do not start before December 22, 2022 ), Disp: 30 tablet, Rfl: 0  •  Tapentadol HCl ER (Nucynta ER) 100 MG TB12, Take 1 PO BID for pain  Do not start before November 23, 2022   (Patient not taking: Reported on 12/19/2022), Disp: 30 tablet, Rfl: 0    Allergies   Allergen Reactions   • Other    • Sulfa Antibiotics Edema     Annotation - [id number]: face and hands redness and swelling       Physical Exam:    /84 Pulse 80   Ht 5' 9" (1 753 m)   Wt 79 8 kg (176 lb)   BMI 25 99 kg/m²     Constitutional:normal, well developed, well nourished, alert, in no distress and non-toxic and no overt pain behavior  Eyes:anicteric  HEENT:grossly intact  Neck:supple, symmetric, trachea midline and no masses   Pulmonary:even and unlabored  Cardiovascular:No edema or pitting edema present  Skin:Normal without rashes or lesions and well hydrated  Psychiatric:Mood and affect appropriate  Neurologic:Cranial Nerves II-XII grossly intact  Musculoskeletal:normal gait  Bilateral upper extremity strength 5 out of 5in all muscle groups  Sensation diminished to light touch in the right upper extremity  Positive Neal's reflex on the right and negative on the left  Positive Spurling's to the right and negative to the left    Negative empty can test on the right      Imaging  CT cervical spine without contrast    (Results Pending)         Orders Placed This Encounter   Procedures   • CT cervical spine without contrast   • Ambulatory referral to Physical Therapy

## 2022-12-16 NOTE — TELEPHONE ENCOUNTER
Patient is S/P a Right L3-L5 RFA c/ JW on 3/18/20  Next OPRO scheduled for 4/1/20  Next OVS scheduled for 4/23/20  Please call on 3/19/20 post OPRO   thanks no concerns

## 2022-12-19 ENCOUNTER — OFFICE VISIT (OUTPATIENT)
Dept: PAIN MEDICINE | Facility: CLINIC | Age: 57
End: 2022-12-19

## 2022-12-19 VITALS
DIASTOLIC BLOOD PRESSURE: 84 MMHG | HEIGHT: 69 IN | SYSTOLIC BLOOD PRESSURE: 150 MMHG | WEIGHT: 176 LBS | BODY MASS INDEX: 26.07 KG/M2 | HEART RATE: 80 BPM

## 2022-12-19 DIAGNOSIS — G89.12 POST-THORACOTOMY PAIN: ICD-10-CM

## 2022-12-19 DIAGNOSIS — M48.04 THORACIC SPINAL STENOSIS: ICD-10-CM

## 2022-12-19 DIAGNOSIS — M79.18 MYOFASCIAL PAIN: ICD-10-CM

## 2022-12-19 DIAGNOSIS — M47.816 LUMBAR SPONDYLOSIS: ICD-10-CM

## 2022-12-19 DIAGNOSIS — Z79.891 LONG-TERM CURRENT USE OF OPIATE ANALGESIC: ICD-10-CM

## 2022-12-19 DIAGNOSIS — M96.1 POST LAMINECTOMY SYNDROME: ICD-10-CM

## 2022-12-19 DIAGNOSIS — M54.12 CERVICAL RADICULOPATHY: ICD-10-CM

## 2022-12-19 DIAGNOSIS — M54.16 LUMBAR RADICULOPATHY: ICD-10-CM

## 2022-12-19 DIAGNOSIS — G50.0 TRIGEMINAL NEURALGIA: ICD-10-CM

## 2022-12-19 DIAGNOSIS — M79.18 MYOFASCIAL PAIN SYNDROME: ICD-10-CM

## 2022-12-19 DIAGNOSIS — M47.14 OTHER SPONDYLOSIS WITH MYELOPATHY, THORACIC REGION: ICD-10-CM

## 2022-12-19 DIAGNOSIS — Z79.891 ENCOUNTER FOR LONG-TERM OPIATE ANALGESIC USE: ICD-10-CM

## 2022-12-19 DIAGNOSIS — M47.812 CERVICAL SPONDYLOSIS WITHOUT MYELOPATHY: ICD-10-CM

## 2022-12-19 DIAGNOSIS — G89.4 CHRONIC PAIN SYNDROME: Primary | ICD-10-CM

## 2022-12-19 DIAGNOSIS — M48.061 SPINAL STENOSIS OF LUMBAR REGION, UNSPECIFIED WHETHER NEUROGENIC CLAUDICATION PRESENT: ICD-10-CM

## 2022-12-19 DIAGNOSIS — F11.20 UNCOMPLICATED OPIOID DEPENDENCE (HCC): ICD-10-CM

## 2022-12-19 RX ORDER — TAPENTADOL HYDROCHLORIDE 100 MG/1
TABLET, FILM COATED, EXTENDED RELEASE ORAL
Qty: 30 TABLET | Refills: 0 | Status: SHIPPED | OUTPATIENT
Start: 2022-12-27

## 2022-12-19 RX ORDER — HYDROCODONE BITARTRATE AND ACETAMINOPHEN 5; 325 MG/1; MG/1
TABLET ORAL
Qty: 50 TABLET | Refills: 0 | Status: SHIPPED | OUTPATIENT
Start: 2023-01-20

## 2022-12-19 RX ORDER — TAPENTADOL HYDROCHLORIDE 100 MG/1
TABLET, FILM COATED, EXTENDED RELEASE ORAL
Qty: 30 TABLET | Refills: 0 | Status: SHIPPED | OUTPATIENT
Start: 2023-01-25

## 2023-02-07 ENCOUNTER — TELEPHONE (OUTPATIENT)
Dept: PAIN MEDICINE | Facility: CLINIC | Age: 58
End: 2023-02-07

## 2023-02-07 DIAGNOSIS — M54.12 CERVICAL RADICULOPATHY: ICD-10-CM

## 2023-02-07 DIAGNOSIS — M54.16 LUMBAR RADICULOPATHY: Primary | ICD-10-CM

## 2023-02-07 RX ORDER — TRAMADOL HYDROCHLORIDE 50 MG/1
50 TABLET ORAL 2 TIMES DAILY PRN
Qty: 60 TABLET | Refills: 0 | Status: SHIPPED | OUTPATIENT
Start: 2023-02-07

## 2023-02-07 NOTE — TELEPHONE ENCOUNTER
Caller: Jori Wells (PT)    Doctor: [facility]    Reason for call: Pt called in the pharmacy on file does not have the Medication HYDROcodone-acetaminophen (NORCO) 5-325 mg per tablet     Pt does not know another pharmacy that would have it      Call back#: [phone]

## 2023-02-07 NOTE — TELEPHONE ENCOUNTER
S/w pt and advised to cb with another pharmacy that he would like script sent to  Pt verbalized understanding

## 2023-02-07 NOTE — TELEPHONE ENCOUNTER
Caller: Anderson Juárez     Doctor: Dr Karely Vilchis     Reason for call: Patient calling back stating non of the [facility] near him has the medication HYDROcodone-acetaminophen (NORCO) 5-325 mg per table  If another medication can be prescribed      Call back#: [phone]

## 2023-02-07 NOTE — TELEPHONE ENCOUNTER
S/w pt and advised of same  Pt verbalized understanding and appreciation      S/w [facility] pharmacy, script cancelled for this month

## 2023-02-07 NOTE — TELEPHONE ENCOUNTER
We can try tramadol 50mg BID PRN pain #60 tablets for this month  Please cancel Crispin Floyd at pharmacy

## 2023-02-07 NOTE — TELEPHONE ENCOUNTER
S/w pt to request another pharmacy and he stated that his insurance only allows him to use [facility] pharmacy  Pt is requesting a substitute pain med    Please advise

## 2023-02-17 ENCOUNTER — OFFICE VISIT (OUTPATIENT)
Dept: PAIN MEDICINE | Facility: CLINIC | Age: 58
End: 2023-02-17

## 2023-02-17 VITALS
DIASTOLIC BLOOD PRESSURE: 79 MMHG | HEART RATE: 90 BPM | SYSTOLIC BLOOD PRESSURE: 168 MMHG | HEIGHT: 69 IN | WEIGHT: 174 LBS | BODY MASS INDEX: 25.77 KG/M2

## 2023-02-17 DIAGNOSIS — M47.812 CERVICAL SPONDYLOSIS WITHOUT MYELOPATHY: ICD-10-CM

## 2023-02-17 DIAGNOSIS — G89.4 CHRONIC PAIN SYNDROME: ICD-10-CM

## 2023-02-17 DIAGNOSIS — G89.12 POST-THORACOTOMY PAIN: ICD-10-CM

## 2023-02-17 DIAGNOSIS — M47.816 LUMBAR SPONDYLOSIS: ICD-10-CM

## 2023-02-17 DIAGNOSIS — Z79.891 LONG-TERM CURRENT USE OF OPIATE ANALGESIC: ICD-10-CM

## 2023-02-17 DIAGNOSIS — M96.1 POST LAMINECTOMY SYNDROME: Primary | ICD-10-CM

## 2023-02-17 DIAGNOSIS — M54.16 LUMBAR RADICULOPATHY: ICD-10-CM

## 2023-02-17 DIAGNOSIS — F11.20 UNCOMPLICATED OPIOID DEPENDENCE (HCC): ICD-10-CM

## 2023-02-17 DIAGNOSIS — E78.5 DYSLIPIDEMIA ASSOCIATED WITH TYPE 2 DIABETES MELLITUS (HCC): ICD-10-CM

## 2023-02-17 DIAGNOSIS — E11.69 DYSLIPIDEMIA ASSOCIATED WITH TYPE 2 DIABETES MELLITUS (HCC): ICD-10-CM

## 2023-02-17 DIAGNOSIS — M54.12 CERVICAL RADICULOPATHY: ICD-10-CM

## 2023-02-17 NOTE — PROGRESS NOTES
Assessment  1  Post laminectomy syndrome    2  Lumbar spondylosis    3  Cervical spondylosis without myelopathy    4  Chronic pain syndrome    5  Lumbar radiculopathy    6  Post-thoracotomy pain    7  Cervical radiculopathy    8  Dyslipidemia associated with type 2 diabetes mellitus ([facility] [address] )        Plan  72-year-old male with a history of T12 decompression and fusion status post [place] spinal cord stimulator [employer] peripheral stimulator, postlaminectomy syndrome, chronic pain syndrome, lumbar spondylosis, cervical spondylosis presenting for interval follow-up regarding neck pain that radiates into the right upper extremity with associated numbness and paresthesias  He does occasionally experience some weakness in the right arm  Patient's low back and leg pain are well controlled at this time and a minor complaint at this time  CT of the cervical spine was ordered at last office visit, however this was denied by insurance  He continues to take Nucynta  mg daily and Reminyl 50 mg as needed daily for breakthrough pain, baclofen 10 mg as needed, and gabapentin 800/600/800/600  He gets approximately 60% of relief from his current medication regimen and denies any side effects  The patient usually takes Norco 5/325 milligrams for breakthrough pain which she feels is more effective than tramadol, however his pharmacy cannot get Amisha Drake in, therefore he is taking tramadol as substitute  This does provide some relief, but again Norco was superior  The patient does not take Nucynta every day  The last time he filled this medication was November 28, 2022 per PDMP  He does still have some prescriptions on file for Nucynta when needed  The patient has unfortunately not had any relief with lumbar epidural steroid injections or lumbar RFA  The patient does have symptoms consistent with cervical radiculopathy    CT of the cervical spine was denied as the patient has not tried and failed conservative treatment  1   I will order physical therapy for the patient's cervical complaints as I feel he would benefit from Ahmet-based exercises  2  Patient may continue with Nucynta  mg daily  He did not need a refill today  3  The patient may continue with tramadol 50 mg as needed for breakthrough pain  He did not need a refill today  4  The patient will continue with baclofen and gabapentin as prescribed  5  If the patient fails 6 weeks of conservative treatment we will order a CT of the lumbar spine without contrast  6  I will follow-up with the patient in 2 months        There are risks associated with opioid medications, including dependence, addiction and tolerance  The patient understands and agrees to use these medications only as prescribed  Potential side effects of the medications include, but are not limited to, constipation, drowsiness, addiction, impaired judgment and risk of fatal overdose if not taken as prescribed  The patient was warned against driving while taking sedation medications  Sharing medications is a felony  At this point in time, the patient is showing no signs of addiction, abuse, diversion or suicidal ideation  A urine drug screen was collected at today's office visit as part of our medication management protocol  The point of care testing results were appropriate for what was being prescribed  The specimen will be sent for confirmatory testing  The drug screen is medically necessary because the patient is either dependent on opioid medication or is being considered for opioid medication therapy and the results could impact ongoing or future treatment  The drug screen is to evaluate for the presences or absence of prescribed, non-prescribed, and/or illicit drugs/substances      [place] Prescription Drug Monitoring Program report was reviewed and was appropriate       My impressions and treatment recommendations were discussed in detail with the patient who verbalized understanding and had no further questions  Discharge instructions were provided  I personally saw and examined the patient and I agree with the above discussed plan of care  No orders of the defined types were placed in this encounter  No orders of the defined types were placed in this encounter  History of Present Illness    Tyrone Araujo is a 62 y o  male with a history of T12 decompression and fusion status post [place] spinal cord stimulator [employer] peripheral stimulator, postlaminectomy syndrome, chronic pain syndrome, lumbar spondylosis, cervical spondylosis presenting for interval follow-up regarding neck pain that radiates into the right upper extremity with associated numbness and paresthesias  He does occasionally experience some weakness in the right arm  Patient's low back and leg pain are well controlled at this time and a minor complaint at this time  He denies any left upper extremity symptoms, bladder or bowel incontinence, saddle anesthesia, or balance issues  CT of the cervical spine was ordered at last office visit, however this was denied by insurance  He continues to take Nucynta  mg daily and Reminyl 50 mg as needed daily for breakthrough pain, baclofen 10 mg as needed, and gabapentin 800/600/800/600  He gets approximately 60% of relief from his current medication regimen and denies any side effects  The patient usually takes Norco 5/325 milligrams for breakthrough pain which she feels is more effective than tramadol, however his pharmacy cannot get Alecia Hawkins in, therefore he is taking tramadol as substitute  This does provide some relief, but again Norco was superior  The patient does not take Nucynta every day  The last time he filled this medication was November 28, 2022 per PDMP  He does still have some prescriptions on file for Nucynta when needed   The patient has unfortunately not had any relief with lumbar epidural steroid injections or lumbar RFA   The patient rates his pain moderate and the pain is worse in the morning and at night  The pain is constant and described as burning, dull, aching, sharp, throbbing, cramping, pressure-like, shooting, numbness, and pins-and-needles  The pain is worse with standing, walking, bending, and exercise  The pain is alleviated with sitting, relaxation, and lying down  Other than as stated above, the patient denies any interval changes in medications, medical condition, mental condition, symptoms, or allergies since the last office visit  I have personally reviewed and/or updated the patient's past medical history, past surgical history, family history, social history, current medications, allergies, and vital signs today  Review of Systems   Constitutional: Negative for fever and unexpected weight change  HENT: Negative for trouble swallowing  Eyes: Negative for visual disturbance  Respiratory: Negative for shortness of breath and wheezing  Cardiovascular: Negative for chest pain and palpitations  Gastrointestinal: Negative for constipation, diarrhea, nausea and vomiting  Endocrine: Negative for cold intolerance, heat intolerance and polydipsia  Genitourinary: Negative for difficulty urinating and frequency  Musculoskeletal: Positive for myalgias  Negative for arthralgias, gait problem and joint swelling  Decreased ROM, pain in extremity   Skin: Negative for rash  Neurological: Positive for weakness  Negative for dizziness, seizures, syncope and headaches  Hematological: Does not bruise/bleed easily  Psychiatric/Behavioral: Negative for dysphoric mood  All other systems reviewed and are negative        Patient Active Problem List   Diagnosis   • Cervical radiculopathy   • Chronic bilateral low back pain   • Lumbar radiculopathy   • Complete rupture of rotator cuff   • Constipation   • Degenerative disc disease, lumbar   • Type 2 or unspecified type diabetes mellitus   • Dysphagia   • Facial pain   • Gait disturbance   • GERD with stricture   • Hypercholesterolemia   • Hyperlipidemia   • Hypertension, essential   • ED (erectile dysfunction) of organic origin   • Kyphoscoliosis   • Limb pain   • Major depressive disorder, single episode   • Mass of thoracic vertebra   • Muscle weakness   • Myofascial pain   • Neuropathy   • Opioid dependence (HCC)   • Other congenital malformations of spine, 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   • Other spondylosis with myelopathy, thoracic region   • Chronic pain syndrome   • Post laminectomy syndrome   • Kyphosis, postlaminectomy   • Post-thoracotomy pain   • Rib pain on right side   • Thoracic disc herniation   • Thoracic spinal stenosis   • TMJ arthralgia   • Tobacco use disorder   • Trigeminal neuralgia   • Trigger finger of both hands   • Type 2 diabetes mellitus without complication, without long-term current use of insulin (HCC)   • Encounter for long-term opiate analgesic use   • DJD of left AC (acromioclavicular) joint   • Left shoulder pain   • Spinal stenosis of lumbar region   • Lumbar spondylosis   • Chronic left shoulder pain   • Cervical spondylosis without myelopathy       No past medical history on file  No past surgical history on file  No family history on file      Social History     Occupational History   • Not on file   Tobacco Use   • Smoking status: Every Day     Types: Cigarettes   • Smokeless tobacco: Never   Substance and Sexual Activity   • Alcohol use: No   • Drug use: No   • Sexual activity: Not on file       Current Outpatient Medications on File Prior to Visit   Medication Sig   • ALPRAZolam (XANAX) 0 25 mg tablet Take 0 25 mg by mouth Three times daily as needed (Patient not taking: Reported on 4/11/2022 )   • amLODIPine (NORVASC) 2 5 mg tablet Take 2 5 mg by mouth daily   • atorvastatin (LIPITOR) 80 mg tablet Take by mouth   • baclofen 10 mg tablet Take 10 mg by mouth as needed   • buPROPion (WELLBUTRIN SR) 150 mg 12 hr tablet Take 1 tablet by mouth 2 (two) times a day   • CHANTIX STARTING MONTH ELÍAS 0 5 MG X 11 & 1 MG X 42 tablet Take by mouth daily As directed   • diclofenac sodium (VOLTAREN) 1 % Apply 1 application topically 4 (four) times a day   • gabapentin (NEURONTIN) 600 MG tablet Take 1 tablet by mouth 2 (two) times a day   • gabapentin (NEURONTIN) 800 mg tablet Take 1 tablet by mouth 2 (two) times a day   • lamoTRIgine (LaMICtal) 100 MG TBDP Take 100 mg by mouth daily   • LamoTRIgine 25 (21)-50 (7) MG KIT USE 1 KIT AS DIRECTED   • lidocaine (LIDODERM) 5 % Apply 1 patch topically   • lisinopril-hydrochlorothiazide (PRINZIDE,ZESTORETIC) 20-12 5 MG per tablet Take 1 tablet by mouth daily   • meloxicam (MOBIC) 15 mg tablet    • metFORMIN (GLUCOPHAGE) 1000 MG tablet Take 1 tablet by mouth 2 (two) times a day   • naloxone (NARCAN) 4 mg/0 1 mL nasal spray Administer 1 spray into a nostril  If no response after 2-3 minutes, give another dose in the other nostril using a new spray  • OXcarbazepine (TRILEPTAL) 600 mg tablet 2 TABLET(S) BY MOUTH TWO TIMES DAILY   • Tapentadol HCl ER (Nucynta ER) 100 MG TB12 Take 1 PO QD for pain  Do not start before December 22, 2022  (Patient not taking: Reported on 12/19/2022 Do not start before December 22, 2022 )   • Tapentadol HCl ER (Nucynta ER) 100 MG TB12 Take 1 PO BID for pain  Do not start before January 25, 2023  • Tapentadol HCl ER (Nucynta ER) 100 MG TB12 Take 1 PO QD for pain  Do not start before December 27, 2022  • traMADol (Ultram) 50 mg tablet Take 1 tablet (50 mg total) by mouth 2 (two) times a day as needed for severe pain     No current facility-administered medications on file prior to visit         Allergies   Allergen Reactions   • Other    • Sulfa Antibiotics Edema     Annotation - [id number]: face and hands redness and swelling           Contract: 02/17/2023  UDS last taken: 02/17/2023  Beck/ SOAAP: 02/17/2023  Nucynta last taken:02/10/2023        Physical Exam    BP 168/79   Pulse 90   Ht 5' 9" (1 753 m)   Wt 78 9 kg (174 lb)   BMI 25 70 kg/m²     Constitutional: normal, well developed, well nourished, alert, in no distress and non-toxic and no overt pain behavior  Eyes: anicteric  HEENT: grossly intact  Neck: supple, symmetric, trachea midline and no masses   Pulmonary:even and unlabored  Cardiovascular:No edema or pitting edema present  Skin:Normal without rashes or lesions and well hydrated  Psychiatric:Mood and affect appropriate  Neurologic:Cranial Nerves II-XII grossly intact  Musculoskeletal:normal gait  Right cervical paraspinals and trapezius mildly tender to palpation  Bilateral upper extremity strength 5 out of 5 in all muscular's  Sensation intact to light touch in C5-T1 dermatomes bilaterally  Negative Spurling's bilaterally  Bilateral thoracic and lumbar paraspinal musculature mildly tender to palpation and ropey in texture  Bilateral lower extremity strength 5 out of 5 in all muscular's  Sensation tact light touch in L3-S1 dermatomes bilaterally  Negative seated straight leg raise bilaterally  Imaging  CT LUMBAR SPINE     INDICATION:   M54 16: Radiculopathy, lumbar region      COMPARISON: 10/7/2019     TECHNIQUE:  Contiguous axial images through the lumbar spine were obtained  Sagittal and coronal reconstructions were performed        Radiation dose length product (DLP) for this visit:  453 mGy-cm   This examination, like all CT scans performed in the [place], was performed utilizing techniques to minimize radiation dose exposure, including the use of iterative   reconstruction and automated exposure control        IMAGE QUALITY:  Diagnostic      FINDINGS:     ALIGNMENT:  There are 5 lumbar type vertebral bodies  Normal alignment of the lumbar spine  No spondylolisthesis or spondylolysis      VERTEBRAL BODIES:  No fracture    No lytic or blastic lesion      DEGENERATIVE CHANGES:     Lower Thoracic spine:  Normal lower thoracic disc spaces  ]     L1-2:  Minimal bulge  Facet arthrosis  No significant canal stenosis or foraminal narrowing      L2-3:  No significant canal stenosis or foraminal narrowing  Facet arthrosis      L3-4:  Bulging annulus  Facet arthrosis with ligamentum flavum thickening  Mild canal stenosis  No significant foraminal narrowing      L4-5:  Bulging annulus  Facet arthrosis  Mild canal stenosis  Mild foraminal encroachment      L5-S1:  Disc space degeneration and narrowing  Bulging annulus  Facet arthrosis  Marginal osteophytosis  Moderate to severe left and mild right foraminal narrowing      PARASPINAL SOFT TISSUES:  Partially imaged leads within the visualized dorsal soft tissues      IMPRESSION:     No significant interval change since prior examination    Multilevel degenerative changes of the lumbar spine, as described above

## 2023-02-21 LAB
6MAM UR QL CFM: NEGATIVE NG/ML
7AMINOCLONAZEPAM UR QL CFM: NEGATIVE NG/ML
A-OH ALPRAZ UR QL CFM: NEGATIVE NG/ML
AMPHET UR QL CFM: NEGATIVE NG/ML
AMPHET UR QL CFM: NEGATIVE NG/ML
BUPRENORPHINE UR QL CFM: NEGATIVE NG/ML
BUTALBITAL UR QL CFM: NEGATIVE NG/ML
BZE UR QL CFM: NEGATIVE NG/ML
CODEINE UR QL CFM: NEGATIVE NG/ML
DESIPRAMINE UR QL CFM: NEGATIVE NG/ML
DESIPRAMINE UR QL CFM: NEGATIVE NG/ML
EDDP UR QL CFM: NEGATIVE NG/ML
ETHYL GLUCURONIDE UR QL CFM: NEGATIVE NG/ML
ETHYL SULFATE UR QL SCN: NEGATIVE NG/ML
EUTYLONE UR QL: NEGATIVE NG/ML
FENTANYL UR QL CFM: NEGATIVE NG/ML
GLIADIN IGG SER IA-ACNC: NEGATIVE NG/ML
GLUCOSE 30M P 50 G LAC PO SERPL-MCNC: NEGATIVE NG/ML
HYDROCODONE UR QL CFM: NEGATIVE NG/ML
HYDROCODONE UR QL CFM: NEGATIVE NG/ML
HYDROMORPHONE UR QL CFM: NEGATIVE NG/ML
IMIPRAMINE UR QL CFM: NEGATIVE NG/ML
LORAZEPAM UR QL CFM: NEGATIVE NG/ML
MDMA UR QL CFM: NEGATIVE NG/ML
ME-PHENIDATE UR QL CFM: NEGATIVE NG/ML
MEPERIDINE UR QL CFM: NEGATIVE NG/ML
METHADONE UR QL CFM: NEGATIVE NG/ML
METHAMPHET UR QL CFM: NEGATIVE NG/ML
MORPHINE UR QL CFM: NEGATIVE NG/ML
MORPHINE UR QL CFM: NEGATIVE NG/ML
NORBUPRENORPHINE UR QL CFM: NEGATIVE NG/ML
NORDIAZEPAM UR QL CFM: NEGATIVE NG/ML
NORFENTANYL UR QL CFM: NEGATIVE NG/ML
NORHYDROCODONE UR QL CFM: NEGATIVE NG/ML
NORHYDROCODONE UR QL CFM: NEGATIVE NG/ML
NORMEPERIDINE UR QL CFM: NEGATIVE NG/ML
NOROXYCODONE UR QL CFM: NEGATIVE NG/ML
OLANZAPINE QUANTIFICATION: NEGATIVE NG/ML
OPC-3373 QUANTIFICATION: NEGATIVE
OXAZEPAM UR QL CFM: NEGATIVE NG/ML
OXYCODONE UR QL CFM: NEGATIVE NG/ML
OXYMORPHONE UR QL CFM: NEGATIVE NG/ML
OXYMORPHONE UR QL CFM: NEGATIVE NG/ML
PARA-FLUOROFENTANYL QUANTIFICATION: NORMAL NG/ML
PCP UR QL CFM: NEGATIVE NG/ML
PHENOBARB UR QL CFM: NEGATIVE NG/ML
RESULT ALL_PRESCRIBED MEDS AND SPECIAL INSTRUCTIONS: NORMAL
SECOBARBITAL UR QL CFM: NEGATIVE NG/ML
SL AMB 4-ANPP QUANTIFICATION: NORMAL NG/ML
SL AMB 5F-ADB-M7 METABOLITE QUANTIFICATION: NEGATIVE NG/ML
SL AMB 7-OH-MITRAGYNINE (KRATOM ALKALOID) QUANTIFICATION: NEGATIVE NG/ML
SL AMB AB-FUBINACA-M3 METABOLITE QUANTIFICATION: NEGATIVE NG/ML
SL AMB ACETYL FENTANYL QUANTIFICATION: NORMAL NG/ML
SL AMB ACETYL NORFENTANYL QUANTIFICATION: NORMAL NG/ML
SL AMB ACRYL FENTANYL QUANTIFICATION: NORMAL NG/ML
SL AMB CARFENTANIL QUANTIFICATION: NORMAL NG/ML
SL AMB CLOZAPINE QUANTIFICATION: NEGATIVE NG/ML
SL AMB CTHC (MARIJUANA METABOLITE) QUANTIFICATION: NEGATIVE NG/ML
SL AMB DEXTROMETHORPHAN QUANTIFICATION: NEGATIVE NG/ML
SL AMB DEXTRORPHAN (DEXTROMETHORPHAN METABOLITE) QUANT: NEGATIVE NG/ML
SL AMB DEXTRORPHAN (DEXTROMETHORPHAN METABOLITE) QUANT: NEGATIVE NG/ML
SL AMB HALOPERIDOL  QUANTIFICATION: NEGATIVE NG/ML
SL AMB HALOPERIDOL METABOLITE QUANTIFICATION: NEGATIVE NG/ML
SL AMB HYDROXYRISPERIDONE QUANTIFICATION: NEGATIVE NG/ML
SL AMB JWH018 METABOLITE QUANTIFICATION: NEGATIVE NG/ML
SL AMB JWH073 METABOLITE QUANTIFICATION: NEGATIVE NG/ML
SL AMB MDMB-FUBINACA-M1 METABOLITE QUANTIFICATION: NEGATIVE NG/ML
SL AMB METHYLONE QUANTIFICATION: NEGATIVE NG/ML
SL AMB N-DESMETHYL-TRAMADOL QUANTIFICATION: NORMAL NG/ML
SL AMB N-DESMETHYLCLOZAPINE QUANTIFICATION: NEGATIVE NG/ML
SL AMB NORQUETIAPINE QUANTIFICATION: NEGATIVE NG/ML
SL AMB PHENTERMINE QUANTIFICATION: NEGATIVE NG/ML
SL AMB QUETIAPINE QUANTIFICATION: NEGATIVE NG/ML
SL AMB RCS4 METABOLITE QUANTIFICATION: NEGATIVE NG/ML
SL AMB RISPERIDONE QUANTIFICATION: NEGATIVE NG/ML
SL AMB RITALINIC ACID QUANTIFICATION: NEGATIVE NG/ML
SPECIMEN DRAWN SERPL: NEGATIVE NG/ML
TAPENTADOL UR QL CFM: ABNORMAL NG/ML
TEMAZEPAM UR QL CFM: NEGATIVE NG/ML
TEMAZEPAM UR QL CFM: NEGATIVE NG/ML
TRAMADOL UR QL CFM: NORMAL NG/ML
URATE/CREAT 24H UR: NORMAL NG/ML

## 2023-05-24 NOTE — PROGRESS NOTES
Assessment:  1  Chronic pain syndrome    2  Cervical radiculopathy    3  Lumbar radiculopathy    4  Thoracic disc herniation    5  Lumbar spondylosis    6  Cervical spondylosis without myelopathy    7  Myofascial pain    8  Uncomplicated opioid dependence ([facility] [address] )    9  Post laminectomy syndrome    10  Spinal stenosis of lumbar region, unspecified whether neurogenic claudication present    11  Long-term current use of opiate analgesic    12  Myofascial pain syndrome        Plan:  1  Patient may continue Nucynta  mg daily as prescribed  The patient was given a 3 month supply of prescriptions with a Do Not Fill date(s) of June 16, 2023, July 15, 2023 and August 13, 2023  I will discontinue tramadol and return the patient to do Norco 5/325 mg 1 tablet twice daily as needed breakthrough pain  The patient was given a 3 month supply of prescriptions with a Do Not Fill date(s) of today, June 24, 2023 and July 23, 2023    [place] Prescription Drug Monitoring Program report was reviewed and was appropriate     Patient has up-to-date Narcan prescription at home    There are risks associated with opioid medications, including dependence, addiction and tolerance  The patient understands and agrees to use these medications only as prescribed  Potential side effects of the medications include, but are not limited to, constipation, drowsiness, addiction, impaired judgment and risk of fatal overdose if not taken as prescribed  The patient was warned against driving while taking sedation medications  Sharing medications is a felony  At this point in time, the patient is showing no signs of addiction, abuse, diversion or suicidal ideation  2   Patient may continue baclofen and gabapentin as prescribed  3  Continue with home exercise program  4  Follow-up in 3 months or sooner if needed    History of Present Illness:     The patient is a 62 y o  male history of T12 decompression and fusion status post [address] [address] spinal cord stimulator and [employer] peripheral stimulator last seen on 02/17/2023 who presents for a follow up office visit in regards to chronic lumbosacral back pain with radiculopathy into the bilateral lower extremities secondary to lumbar postlaminectomy syndrome and chronic pain syndrome he denies bowel or bladder incontinence or saddle anesthesia  He was seen last office visit for cervical complaints radiating into the upper extremities, however he states they resolved with a home exercise program   He continues on Nucynta  mg daily and tramadol 50 mg as needed breakthrough pain ever would like to return back to Norco 5/325 mg and discontinue the tramadol  He continues on gabapentin 800/600/800/600 and baclofen 10 mg as needed overall with a 60% improvement of his pain without side effects  The patient rates his pain a 5 out of 10 on the numeric pain rating scale  He constantly has pain in the morning and at night which is described as dull aching, sharp, throbbing, cramping, pressure-like, burning, shooting, numbness and pins-and-needles    Pain Contract Signed: 2/17/2023  Last Urine Drug Screen: 2/17/2023  Beck/SOAPP 2/17/2023  Last Nucynta per pt  5/26/2023    I have personally reviewed and/or updated the patient's past medical history, past surgical history, family history, social history, current medications, allergies, and vital signs today  Review of Systems:    Review of Systems   Respiratory: Negative for shortness of breath  Cardiovascular: Negative for chest pain  Gastrointestinal: Negative for constipation, diarrhea, nausea and vomiting  Musculoskeletal: Negative for arthralgias, gait problem, joint swelling and myalgias  Skin: Negative for rash  Neurological: Negative for dizziness, seizures and weakness  All other systems reviewed and are negative  No past medical history on file  No past surgical history on file  No family history on file      Social History     Occupational History   • Not on file   Tobacco Use   • Smoking status: Every Day     Types: Cigarettes   • Smokeless tobacco: Never   Substance and Sexual Activity   • Alcohol use: No   • Drug use: No   • Sexual activity: Not on file         Current Outpatient Medications:   •  HYDROcodone-acetaminophen (Norco) 5-325 mg per tablet, Take 1 tablet by mouth 2 (two) times a day as needed (breakthrough pain) Max Daily Amount: 2 tablets, Disp: 60 tablet, Rfl: 0  •  [START ON 6/24/2023] HYDROcodone-acetaminophen (Norco) 5-325 mg per tablet, Take 1 tablet by mouth 2 (two) times a day as needed (breakthrough pain) Max Daily Amount: 2 tablets Do not start before June 24, 2023 , Disp: 60 tablet, Rfl: 0  •  [START ON 7/23/2023] HYDROcodone-acetaminophen (Norco) 5-325 mg per tablet, Take 1 tablet by mouth 2 (two) times a day as needed (breakthrough pain) Max Daily Amount: 2 tablets Do not start before July 23, 2023 , Disp: 60 tablet, Rfl: 0  •  [START ON 7/23/2023] Tapentadol HCl ER (Nucynta ER) 100 MG TB12, Take 1 tablet (100 mg total) by mouth in the morning Max Daily Amount: 100 mg Do not start before July 23, 2023 , Disp: 30 tablet, Rfl: 0  •  [START ON 7/15/2023] Tapentadol HCl ER (Nucynta ER) 100 MG TB12, Take 1 tablet (100 mg total) by mouth in the morning Max Daily Amount: 100 mg Do not start before July 15, 2023 , Disp: 30 tablet, Rfl: 0  •  [START ON 6/16/2023] Tapentadol HCl ER (Nucynta ER) 100 MG TB12, Take 1 tablet (100 mg total) by mouth in the morning Max Daily Amount: 100 mg Do not start before June 16, 2023 , Disp: 30 tablet, Rfl: 0  •  ALPRAZolam (XANAX) 0 25 mg tablet, Take 0 25 mg by mouth Three times daily as needed (Patient not taking: Reported on 4/11/2022 ), Disp: , Rfl:   •  amLODIPine (NORVASC) 2 5 mg tablet, Take 2 5 mg by mouth daily, Disp: , Rfl:   •  atorvastatin (LIPITOR) 80 mg tablet, Take by mouth, Disp: , Rfl:   •  baclofen 10 mg tablet, Take 10 mg by mouth as needed, Disp: , Rfl: "  •  buPROPion (WELLBUTRIN SR) 150 mg 12 hr tablet, Take 1 tablet by mouth 2 (two) times a day, Disp: , Rfl:   •  CHANTIX STARTING MONTH ELÍAS 0 5 MG X 11 & 1 MG X 42 tablet, Take by mouth daily As directed, Disp: , Rfl: 0  •  diclofenac sodium (VOLTAREN) 1 %, Apply 1 application topically 4 (four) times a day, Disp: , Rfl:   •  gabapentin (NEURONTIN) 600 MG tablet, Take 1 tablet by mouth 2 (two) times a day, Disp: , Rfl:   •  gabapentin (NEURONTIN) 800 mg tablet, Take 1 tablet by mouth 2 (two) times a day, Disp: , Rfl:   •  lamoTRIgine (LaMICtal) 100 MG TBDP, Take 100 mg by mouth daily, Disp: , Rfl:   •  LamoTRIgine 25 (21)-50 (7) MG KIT, USE 1 KIT AS DIRECTED, Disp: , Rfl: 0  •  lidocaine (LIDODERM) 5 %, Apply 1 patch topically, Disp: , Rfl:   •  lisinopril-hydrochlorothiazide (PRINZIDE,ZESTORETIC) 20-12 5 MG per tablet, Take 1 tablet by mouth daily, Disp: , Rfl: 3  •  meloxicam (MOBIC) 15 mg tablet, , Disp: , Rfl:   •  metFORMIN (GLUCOPHAGE) 1000 MG tablet, Take 1 tablet by mouth 2 (two) times a day, Disp: , Rfl:   •  naloxone (NARCAN) 4 mg/0 1 mL nasal spray, Administer 1 spray into a nostril  If no response after 2-3 minutes, give another dose in the other nostril using a new spray , Disp: 1 each, Rfl: 1  •  OXcarbazepine (TRILEPTAL) 600 mg tablet, 2 TABLET(S) BY MOUTH TWO TIMES DAILY, Disp: , Rfl: 2    Allergies   Allergen Reactions   • Other    • Sulfa Antibiotics Edema     Annotation - [id number]: face and hands redness and swelling       Physical Exam:    /89   Ht 5' 9\" (1 753 m)   Wt 78 9 kg (174 lb)   BMI 25 70 kg/m²     Constitutional:normal, well developed, well nourished, alert, in no distress and non-toxic and no overt pain behavior    Eyes:anicteric  HEENT:grossly intact  Neck:supple, symmetric, trachea midline and no masses   Pulmonary:even and unlabored  Cardiovascular:No edema or pitting edema present  Skin:Normal without rashes or lesions and well hydrated  Psychiatric:Mood and affect " appropriate  Neurologic:Cranial Nerves II-XII grossly intact  Musculoskeletal:normal gait      Imaging  No orders to display         No orders of the defined types were placed in this encounter

## 2023-05-26 ENCOUNTER — OFFICE VISIT (OUTPATIENT)
Dept: PAIN MEDICINE | Facility: CLINIC | Age: 58
End: 2023-05-26

## 2023-05-26 VITALS
SYSTOLIC BLOOD PRESSURE: 151 MMHG | HEIGHT: 69 IN | WEIGHT: 174 LBS | DIASTOLIC BLOOD PRESSURE: 89 MMHG | BODY MASS INDEX: 25.77 KG/M2

## 2023-05-26 DIAGNOSIS — M79.18 MYOFASCIAL PAIN SYNDROME: ICD-10-CM

## 2023-05-26 DIAGNOSIS — G89.4 CHRONIC PAIN SYNDROME: Primary | ICD-10-CM

## 2023-05-26 DIAGNOSIS — Z79.891 LONG-TERM CURRENT USE OF OPIATE ANALGESIC: ICD-10-CM

## 2023-05-26 DIAGNOSIS — M48.061 SPINAL STENOSIS OF LUMBAR REGION, UNSPECIFIED WHETHER NEUROGENIC CLAUDICATION PRESENT: ICD-10-CM

## 2023-05-26 DIAGNOSIS — M47.816 LUMBAR SPONDYLOSIS: ICD-10-CM

## 2023-05-26 DIAGNOSIS — M79.18 MYOFASCIAL PAIN: ICD-10-CM

## 2023-05-26 DIAGNOSIS — M96.1 POST LAMINECTOMY SYNDROME: ICD-10-CM

## 2023-05-26 DIAGNOSIS — F11.20 UNCOMPLICATED OPIOID DEPENDENCE (HCC): ICD-10-CM

## 2023-05-26 DIAGNOSIS — M51.24 THORACIC DISC HERNIATION: ICD-10-CM

## 2023-05-26 DIAGNOSIS — M47.812 CERVICAL SPONDYLOSIS WITHOUT MYELOPATHY: ICD-10-CM

## 2023-05-26 DIAGNOSIS — M54.12 CERVICAL RADICULOPATHY: ICD-10-CM

## 2023-05-26 DIAGNOSIS — M54.16 LUMBAR RADICULOPATHY: ICD-10-CM

## 2023-05-26 RX ORDER — HYDROCODONE BITARTRATE AND ACETAMINOPHEN 5; 325 MG/1; MG/1
1 TABLET ORAL 2 TIMES DAILY PRN
Qty: 60 TABLET | Refills: 0 | Status: SHIPPED | OUTPATIENT
Start: 2023-05-26

## 2023-05-26 RX ORDER — TAPENTADOL HYDROCHLORIDE 100 MG/1
1 TABLET, FILM COATED, EXTENDED RELEASE ORAL DAILY
Qty: 30 TABLET | Refills: 0 | Status: SHIPPED | OUTPATIENT
Start: 2023-06-16

## 2023-05-26 RX ORDER — HYDROCODONE BITARTRATE AND ACETAMINOPHEN 5; 325 MG/1; MG/1
1 TABLET ORAL 2 TIMES DAILY PRN
Qty: 60 TABLET | Refills: 0 | Status: SHIPPED | OUTPATIENT
Start: 2023-07-23

## 2023-05-26 RX ORDER — TAPENTADOL HYDROCHLORIDE 100 MG/1
1 TABLET, FILM COATED, EXTENDED RELEASE ORAL DAILY
Qty: 30 TABLET | Refills: 0 | Status: SHIPPED | OUTPATIENT
Start: 2023-07-15

## 2023-05-26 RX ORDER — TAPENTADOL HYDROCHLORIDE 100 MG/1
1 TABLET, FILM COATED, EXTENDED RELEASE ORAL DAILY
Qty: 30 TABLET | Refills: 0 | Status: SHIPPED | OUTPATIENT
Start: 2023-07-23

## 2023-05-26 RX ORDER — HYDROCODONE BITARTRATE AND ACETAMINOPHEN 5; 325 MG/1; MG/1
1 TABLET ORAL 2 TIMES DAILY PRN
Qty: 60 TABLET | Refills: 0 | Status: SHIPPED | OUTPATIENT
Start: 2023-06-24

## 2023-05-26 NOTE — PATIENT INSTRUCTIONS

## 2023-06-05 ENCOUNTER — TELEPHONE (OUTPATIENT)
Dept: PAIN MEDICINE | Facility: CLINIC | Age: 58
End: 2023-06-05

## 2023-06-05 DIAGNOSIS — M79.18 MYOFASCIAL PAIN: ICD-10-CM

## 2023-06-05 DIAGNOSIS — M48.061 SPINAL STENOSIS OF LUMBAR REGION, UNSPECIFIED WHETHER NEUROGENIC CLAUDICATION PRESENT: ICD-10-CM

## 2023-06-05 DIAGNOSIS — M47.816 LUMBAR SPONDYLOSIS: ICD-10-CM

## 2023-06-05 DIAGNOSIS — M47.812 CERVICAL SPONDYLOSIS WITHOUT MYELOPATHY: Primary | ICD-10-CM

## 2023-06-05 NOTE — TELEPHONE ENCOUNTER
Caller:Cheikh  Doctor: Smita Dougherty    Reason for call: his pharmacy does not have any hydrocodone 5-325 mg 1 tablet 2 x a day as needed  [facility] is completely out    Call back#: [phone]

## 2023-06-05 NOTE — TELEPHONE ENCOUNTER
S/W pt  Pt stated [facility] does not have the medication anywhere  He stated [facility] gave him a substitution before- tramadol  Please advise

## 2023-06-05 NOTE — TELEPHONE ENCOUNTER
That or he can call other local pharmacies in the area other than [facility] and see if they have any including pharmacies in food stores

## 2023-06-12 RX ORDER — TRAMADOL HYDROCHLORIDE 50 MG/1
50 TABLET ORAL 2 TIMES DAILY PRN
Qty: 60 TABLET | Refills: 0 | Status: SHIPPED | OUTPATIENT
Start: 2023-06-12

## 2023-06-12 NOTE — TELEPHONE ENCOUNTER
S/w pt who reports [facility] does not have his hydrocodone and is willing to have tramadol sent to [facility]   Pt has to use [facility]     Please advise

## 2023-06-12 NOTE — TELEPHONE ENCOUNTER
He wants other script sent in interim    He said no other [facility] in area have it and he has to use [facility]

## 2023-06-12 NOTE — TELEPHONE ENCOUNTER
Does he definitely want to change medication or just call other local pharmacies near him to see if they have the [address] in stock?

## 2023-06-12 NOTE — TELEPHONE ENCOUNTER
Attempted to reach pt 2 x, phone rings, no option to leave VM (pt requested detailed VM be left on VM)  Will send f/u Tesaris message, advising of the same

## 2023-06-12 NOTE — TELEPHONE ENCOUNTER
Sent Tramadol 50mg BID PRN pain to [facility] on file   I canceled RX for this month for Norco  Please have him let me know if he has problem filling when he goes to get his [place] script after this Tramadol RX

## 2023-06-14 NOTE — TELEPHONE ENCOUNTER
Attempted to contact pt, line continued to ring without option to leave vm  [employer] message sent on 6/12 has not been read at this time

## 2023-07-17 DIAGNOSIS — M48.061 SPINAL STENOSIS OF LUMBAR REGION, UNSPECIFIED WHETHER NEUROGENIC CLAUDICATION PRESENT: ICD-10-CM

## 2023-07-17 DIAGNOSIS — M47.816 LUMBAR SPONDYLOSIS: ICD-10-CM

## 2023-07-17 DIAGNOSIS — M47.812 CERVICAL SPONDYLOSIS WITHOUT MYELOPATHY: ICD-10-CM

## 2023-07-17 RX ORDER — TRAMADOL HYDROCHLORIDE 50 MG/1
TABLET ORAL
Qty: 60 TABLET | Refills: 0 | OUTPATIENT
Start: 2023-07-17

## 2023-07-18 NOTE — TELEPHONE ENCOUNTER
Pt is calling in stating that the Hydrocodone is out of stock    Nucynta can't be filled until next week, the last time it was filled was back in April. He is not of pills, he said     . Pt contacted Call Center requested refill of their medication.         Medication Name: traMADol (Ultram          Dosage of Med: 50 mg tablet       Frequency of Med: 2xs a day     Remaining Medication: 0    [id number] [place] - [address]

## 2023-07-19 DIAGNOSIS — M47.816 LUMBAR SPONDYLOSIS: ICD-10-CM

## 2023-07-19 DIAGNOSIS — M47.812 CERVICAL SPONDYLOSIS WITHOUT MYELOPATHY: ICD-10-CM

## 2023-07-19 DIAGNOSIS — M48.061 SPINAL STENOSIS OF LUMBAR REGION, UNSPECIFIED WHETHER NEUROGENIC CLAUDICATION PRESENT: ICD-10-CM

## 2023-07-21 RX ORDER — TRAMADOL HYDROCHLORIDE 50 MG/1
50 TABLET ORAL 2 TIMES DAILY PRN
Qty: 60 TABLET | Refills: 0 | Status: SHIPPED | OUTPATIENT
Start: 2023-07-21

## 2023-07-26 ENCOUNTER — TELEPHONE (OUTPATIENT)
Dept: PAIN MEDICINE | Facility: MEDICAL CENTER | Age: 58
End: 2023-07-26

## 2023-07-26 NOTE — TELEPHONE ENCOUNTER
Caller: patient    Doctor: Diana Garcia    Reason for call: prior auth need for nucynta.       pharmacy got 48 tabs of hydrocodone and patient  will stop taking the tramadol     Call back#: